# Patient Record
Sex: FEMALE | Race: WHITE | Employment: OTHER | URBAN - METROPOLITAN AREA
[De-identification: names, ages, dates, MRNs, and addresses within clinical notes are randomized per-mention and may not be internally consistent; named-entity substitution may affect disease eponyms.]

---

## 2017-04-04 ENCOUNTER — LAB ENCOUNTER (OUTPATIENT)
Dept: LAB | Facility: HOSPITAL | Age: 82
End: 2017-04-04
Attending: INTERNAL MEDICINE
Payer: MEDICARE

## 2017-04-04 DIAGNOSIS — E78.5 SERUM LIPIDS HIGH: ICD-10-CM

## 2017-04-04 DIAGNOSIS — E11.9 DM (DIABETES MELLITUS) (HCC): Primary | ICD-10-CM

## 2017-04-04 DIAGNOSIS — R19.7 DIARRHEA: ICD-10-CM

## 2017-04-04 DIAGNOSIS — R53.83 FATIGUE: ICD-10-CM

## 2017-04-04 PROCEDURE — 87046 STOOL CULTR AEROBIC BACT EA: CPT

## 2017-04-04 PROCEDURE — 82272 OCCULT BLD FECES 1-3 TESTS: CPT

## 2017-04-04 PROCEDURE — 87269 GIARDIA AG IF: CPT

## 2017-04-04 PROCEDURE — 87045 FECES CULTURE AEROBIC BACT: CPT

## 2017-04-04 PROCEDURE — 87493 C DIFF AMPLIFIED PROBE: CPT

## 2017-04-04 PROCEDURE — 87177 OVA AND PARASITES SMEARS: CPT

## 2017-04-04 PROCEDURE — 87015 SPECIMEN INFECT AGNT CONCNTJ: CPT

## 2017-04-04 PROCEDURE — 87427 SHIGA-LIKE TOXIN AG IA: CPT

## 2017-04-04 PROCEDURE — 87209 SMEAR COMPLEX STAIN: CPT

## 2017-04-05 ENCOUNTER — LAB ENCOUNTER (OUTPATIENT)
Dept: LAB | Facility: HOSPITAL | Age: 82
End: 2017-04-05
Attending: INTERNAL MEDICINE
Payer: MEDICARE

## 2017-04-05 DIAGNOSIS — E11.9 DM (DIABETES MELLITUS) (HCC): Primary | ICD-10-CM

## 2017-04-05 DIAGNOSIS — R63.5 WEIGHT GAIN: ICD-10-CM

## 2017-04-05 DIAGNOSIS — E78.5 SERUM LIPIDS HIGH: ICD-10-CM

## 2017-04-05 DIAGNOSIS — R53.83 FATIGUE: ICD-10-CM

## 2017-04-05 DIAGNOSIS — R19.7 DIARRHEA: ICD-10-CM

## 2017-04-05 PROCEDURE — 80061 LIPID PANEL: CPT

## 2017-04-05 PROCEDURE — 80053 COMPREHEN METABOLIC PANEL: CPT

## 2017-04-05 PROCEDURE — 85025 COMPLETE CBC W/AUTO DIFF WBC: CPT

## 2017-04-05 PROCEDURE — 36415 COLL VENOUS BLD VENIPUNCTURE: CPT

## 2017-04-05 PROCEDURE — 84443 ASSAY THYROID STIM HORMONE: CPT

## 2017-06-19 ENCOUNTER — HOSPITAL ENCOUNTER (OUTPATIENT)
Dept: MAMMOGRAPHY | Facility: HOSPITAL | Age: 82
Discharge: HOME | End: 2017-06-19
Attending: INTERNAL MEDICINE
Payer: MEDICARE

## 2017-06-19 ENCOUNTER — HOSPITAL ENCOUNTER (OUTPATIENT)
Dept: ULTRASOUND IMAGING | Facility: HOSPITAL | Age: 82
Discharge: HOME | End: 2017-06-19
Attending: INTERNAL MEDICINE
Payer: MEDICARE

## 2017-06-19 ENCOUNTER — HOSPITAL ENCOUNTER (OUTPATIENT)
Dept: GENERAL RADIOLOGY | Facility: HOSPITAL | Age: 82
Discharge: HOME | End: 2017-06-19
Attending: INTERNAL MEDICINE
Payer: MEDICARE

## 2017-06-19 DIAGNOSIS — Z12.31 ENCOUNTER FOR SCREENING MAMMOGRAM FOR MALIGNANT NEOPLASM OF BREAST: ICD-10-CM

## 2017-06-19 DIAGNOSIS — Z20.1 EXPOSURE TO TB: ICD-10-CM

## 2017-06-19 DIAGNOSIS — I65.29 CAROTID STENOSIS: ICD-10-CM

## 2017-06-19 PROCEDURE — 71020 XR CHEST PA + LAT CHEST (CPT=71020): CPT | Performed by: INTERNAL MEDICINE

## 2017-06-19 PROCEDURE — 77067 SCR MAMMO BI INCL CAD: CPT | Performed by: INTERNAL MEDICINE

## 2017-06-19 PROCEDURE — 93880 EXTRACRANIAL BILAT STUDY: CPT | Performed by: INTERNAL MEDICINE

## 2018-03-29 ENCOUNTER — HOSPITAL ENCOUNTER (OUTPATIENT)
Dept: CT IMAGING | Facility: HOSPITAL | Age: 83
Discharge: HOME | End: 2018-03-29
Attending: INTERNAL MEDICINE
Payer: MEDICARE

## 2018-03-29 ENCOUNTER — LAB ENCOUNTER (OUTPATIENT)
Dept: LAB | Facility: HOSPITAL | Age: 83
End: 2018-03-29
Attending: INTERNAL MEDICINE
Payer: MEDICARE

## 2018-03-29 DIAGNOSIS — Z82.49 FAMILY HISTORY OF ABDOMINAL AORTIC ANEURYSM (AAA): ICD-10-CM

## 2018-03-29 DIAGNOSIS — R19.7 DIARRHEA: ICD-10-CM

## 2018-03-29 DIAGNOSIS — R53.83 FATIGUE: ICD-10-CM

## 2018-03-29 DIAGNOSIS — R63.5 WEIGHT GAIN: ICD-10-CM

## 2018-03-29 DIAGNOSIS — E78.5 SERUM LIPIDS HIGH: ICD-10-CM

## 2018-03-29 DIAGNOSIS — E11.9 DM (DIABETES MELLITUS) (HCC): Primary | ICD-10-CM

## 2018-03-29 DIAGNOSIS — Z82.49 FH: ABDOMINAL AORTIC ANEURYSM: ICD-10-CM

## 2018-03-29 DIAGNOSIS — Z01.818 PREOP TESTING: ICD-10-CM

## 2018-03-29 LAB — CREAT BLD-MCNC: 1.74 MG/DL (ref 0.55–1.02)

## 2018-03-29 PROCEDURE — 82565 ASSAY OF CREATININE: CPT

## 2018-03-29 PROCEDURE — 75635 CT ANGIO ABDOMINAL ARTERIES: CPT | Performed by: INTERNAL MEDICINE

## 2018-03-29 PROCEDURE — 36415 COLL VENOUS BLD VENIPUNCTURE: CPT

## 2018-04-09 NOTE — IMAGING NOTE
Patient called 4/9/18 to inform the she has hives after ti exam on 3/29/18. She took Benadryl and then later informed her MD. CT Technologist, Bentley Montana, educated patient to always be pre medicated from now on. Radiology RN made aware of incident.

## 2018-04-12 ENCOUNTER — LAB ENCOUNTER (OUTPATIENT)
Dept: LAB | Facility: HOSPITAL | Age: 83
End: 2018-04-12
Attending: INTERNAL MEDICINE
Payer: MEDICARE

## 2018-04-12 DIAGNOSIS — R79.9 ABNORMAL BLOOD CHEMISTRY: Primary | ICD-10-CM

## 2018-04-12 PROCEDURE — 36415 COLL VENOUS BLD VENIPUNCTURE: CPT

## 2018-04-12 PROCEDURE — 84520 ASSAY OF UREA NITROGEN: CPT

## 2018-04-12 PROCEDURE — 82565 ASSAY OF CREATININE: CPT

## 2018-04-25 ENCOUNTER — LAB ENCOUNTER (OUTPATIENT)
Dept: LAB | Facility: HOSPITAL | Age: 83
End: 2018-04-25
Attending: INTERNAL MEDICINE
Payer: MEDICARE

## 2018-04-25 DIAGNOSIS — R79.9 ELEVATED BUN: Primary | ICD-10-CM

## 2018-04-25 DIAGNOSIS — R79.89 ELEVATED SERUM CREATININE: ICD-10-CM

## 2018-04-25 PROCEDURE — 82575 CREATININE CLEARANCE TEST: CPT

## 2018-04-25 PROCEDURE — 82565 ASSAY OF CREATININE: CPT

## 2018-04-25 PROCEDURE — 36415 COLL VENOUS BLD VENIPUNCTURE: CPT

## 2018-05-17 ENCOUNTER — LAB ENCOUNTER (OUTPATIENT)
Dept: LAB | Facility: HOSPITAL | Age: 83
End: 2018-05-17
Attending: INTERNAL MEDICINE
Payer: MEDICARE

## 2018-05-17 DIAGNOSIS — E78.5 ELEVATED LIPIDS: ICD-10-CM

## 2018-05-17 DIAGNOSIS — R79.89 LOW VITAMIN D LEVEL: ICD-10-CM

## 2018-05-17 DIAGNOSIS — R53.83 FATIGUE: ICD-10-CM

## 2018-05-17 DIAGNOSIS — R73.09 ELEVATED GLUCOSE: Primary | ICD-10-CM

## 2018-05-17 PROCEDURE — 85025 COMPLETE CBC W/AUTO DIFF WBC: CPT

## 2018-05-17 PROCEDURE — 80053 COMPREHEN METABOLIC PANEL: CPT

## 2018-05-17 PROCEDURE — 36415 COLL VENOUS BLD VENIPUNCTURE: CPT

## 2018-05-17 PROCEDURE — 80061 LIPID PANEL: CPT

## 2018-05-17 PROCEDURE — 84443 ASSAY THYROID STIM HORMONE: CPT

## 2018-05-17 PROCEDURE — 83036 HEMOGLOBIN GLYCOSYLATED A1C: CPT

## 2018-05-17 PROCEDURE — 84439 ASSAY OF FREE THYROXINE: CPT

## 2018-05-29 ENCOUNTER — OFFICE VISIT (OUTPATIENT)
Dept: NEPHROLOGY | Facility: CLINIC | Age: 83
End: 2018-05-29

## 2018-05-29 VITALS — DIASTOLIC BLOOD PRESSURE: 62 MMHG | BODY MASS INDEX: 29 KG/M2 | SYSTOLIC BLOOD PRESSURE: 134 MMHG | WEIGHT: 172 LBS

## 2018-05-29 DIAGNOSIS — I10 ESSENTIAL HYPERTENSION: ICD-10-CM

## 2018-05-29 DIAGNOSIS — N28.1 RENAL CYST: ICD-10-CM

## 2018-05-29 DIAGNOSIS — N18.30 CKD (CHRONIC KIDNEY DISEASE) STAGE 3, GFR 30-59 ML/MIN (HCC): Primary | ICD-10-CM

## 2018-05-29 PROCEDURE — 99204 OFFICE O/P NEW MOD 45 MIN: CPT | Performed by: INTERNAL MEDICINE

## 2018-05-29 RX ORDER — ALLOPURINOL 100 MG/1
100 TABLET ORAL DAILY
COMMUNITY
End: 2019-02-04

## 2018-05-29 RX ORDER — ASPIRIN 325 MG
325 TABLET ORAL DAILY
COMMUNITY
End: 2021-10-25

## 2018-05-29 RX ORDER — GLIMEPIRIDE 2 MG/1
2 TABLET ORAL 2 TIMES DAILY
COMMUNITY
End: 2019-01-29

## 2018-05-29 NOTE — PROGRESS NOTES
Nephrology Consult Note    REASON FOR CONSULT: CKD 3    ASSESSMENT/PLAN:        1) CKD 3- baseline Cr approx 1.7-1.8 mg/dl since 2015 (and likely previously- but no old labs for comparison on Epic); agree this is likely due to a combination of hypertensive due to a response to renal injury. There is no evidence of malignancy; does not require serial imaging.          HPI:   Reather Libman is a 80year old female with Patient presents with:  Adonis Hutson MD    Presents for evaluation of every morning. Disp:  Rfl:    Metoprolol Succinate ER (TOPROL XL) 25 MG Oral Tablet 24 Hr Take 12.5 mg by mouth daily. Disp:  Rfl:    Atorvastatin Calcium (LIPITOR) 40 MG Oral Tab Take 40 mg by mouth nightly.  Disp:  Rfl:    Acidophilus/Pectin (PROBIOTIC) O

## 2019-01-29 ENCOUNTER — OFFICE VISIT (OUTPATIENT)
Dept: INTERNAL MEDICINE CLINIC | Facility: CLINIC | Age: 84
End: 2019-01-29
Payer: MEDICARE

## 2019-01-29 ENCOUNTER — TELEPHONE (OUTPATIENT)
Dept: INTERNAL MEDICINE CLINIC | Facility: CLINIC | Age: 84
End: 2019-01-29

## 2019-01-29 VITALS
SYSTOLIC BLOOD PRESSURE: 130 MMHG | HEIGHT: 64.5 IN | RESPIRATION RATE: 16 BRPM | HEART RATE: 82 BPM | WEIGHT: 167.5 LBS | BODY MASS INDEX: 28.25 KG/M2 | DIASTOLIC BLOOD PRESSURE: 60 MMHG | TEMPERATURE: 98 F | OXYGEN SATURATION: 99 %

## 2019-01-29 DIAGNOSIS — N18.4 CKD (CHRONIC KIDNEY DISEASE) STAGE 4, GFR 15-29 ML/MIN (HCC): ICD-10-CM

## 2019-01-29 DIAGNOSIS — E78.00 HIGH CHOLESTEROL: ICD-10-CM

## 2019-01-29 DIAGNOSIS — I10 ESSENTIAL HYPERTENSION: ICD-10-CM

## 2019-01-29 DIAGNOSIS — Z86.73 HISTORY OF STROKE: ICD-10-CM

## 2019-01-29 DIAGNOSIS — I77.9 BILATERAL CAROTID ARTERY DISEASE, UNSPECIFIED TYPE (HCC): ICD-10-CM

## 2019-01-29 DIAGNOSIS — M10.9 GOUT, UNSPECIFIED CAUSE, UNSPECIFIED CHRONICITY, UNSPECIFIED SITE: ICD-10-CM

## 2019-01-29 DIAGNOSIS — I70.0 TYPE 2 DIABETES MELLITUS WITH ATHEROSCLEROSIS OF AORTA (HCC): ICD-10-CM

## 2019-01-29 DIAGNOSIS — E11.9 TYPE 2 DIABETES MELLITUS WITHOUT COMPLICATION, WITHOUT LONG-TERM CURRENT USE OF INSULIN (HCC): Primary | ICD-10-CM

## 2019-01-29 DIAGNOSIS — E11.51 TYPE 2 DIABETES MELLITUS WITH ATHEROSCLEROSIS OF AORTA (HCC): ICD-10-CM

## 2019-01-29 PROBLEM — E11.59 TYPE 2 DIABETES MELLITUS WITH ATHEROSCLEROSIS OF AORTA (HCC): Status: ACTIVE | Noted: 2019-01-29

## 2019-01-29 PROBLEM — E11.22 CKD STAGE 4 DUE TO TYPE 2 DIABETES MELLITUS (HCC): Chronic | Status: ACTIVE | Noted: 2019-01-29

## 2019-01-29 PROBLEM — I12.9 CKD STAGE 4 SECONDARY TO HYPERTENSION (HCC): Chronic | Status: ACTIVE | Noted: 2019-01-29

## 2019-01-29 PROCEDURE — 99204 OFFICE O/P NEW MOD 45 MIN: CPT | Performed by: INTERNAL MEDICINE

## 2019-01-29 RX ORDER — METFORMIN HYDROCHLORIDE 500 MG/1
1000 TABLET, EXTENDED RELEASE ORAL
Qty: 180 TABLET | Refills: 0 | Status: SHIPPED | OUTPATIENT
Start: 2019-01-29 | End: 2019-04-27

## 2019-01-29 RX ORDER — LOSARTAN POTASSIUM 25 MG/1
25 TABLET ORAL DAILY
Qty: 90 TABLET | Refills: 0 | Status: SHIPPED | OUTPATIENT
Start: 2019-01-29 | End: 2019-04-04

## 2019-01-29 NOTE — PROGRESS NOTES
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)     History of stroke      Current Medications:      Current Outpatient Medications:  Glucose Blood (ONETOUCH ULTRA BLUE) In Vitro Strip USE TO TEST BLOOD SUGERS D Topics      Concerns:        Caffeine Concern: Not Asked        Exercise: Not Asked        Seat Belt: Not Asked        Special Diet: Not Asked        Stress Concern: Not Asked        Weight Concern: Not Asked    Social History Narrative      Not on file tender,FROM of the back  EXTREMITIES: no cyanosis, clubbing or edema  NEURO: Oriented times three,cranial nerves are intact,motor and sensory are grossly intact    ASSESSMENT AND PLAN:   1.  Type 2 diabetes mellitus without complication, without long-term c Cholesterol, Total 124 <200 mg/dL    Triglycerides 170 (H) <150 mg/dL    HDL Cholesterol 45 (L) >45 mg/dL    LDL Cholesterol 45 <130 mg/dL    VLDL 34 5 - 40 mg/dL    Chol/HDL Ratio 2.76 <4.44    Non HDL Chol 79 <130 mg/dL   TSH+FREE T4   Result Value Re

## 2019-02-01 NOTE — TELEPHONE ENCOUNTER
Records request for all of 2015 faxed to [facility], [phone] (fax); [phone] (phone)

## 2019-02-04 ENCOUNTER — MED REC SCAN ONLY (OUTPATIENT)
Dept: INTERNAL MEDICINE CLINIC | Facility: CLINIC | Age: 84
End: 2019-02-04

## 2019-02-04 RX ORDER — ATORVASTATIN CALCIUM 40 MG/1
40 TABLET, FILM COATED ORAL NIGHTLY
Qty: 90 TABLET | Refills: 0 | Status: SHIPPED | OUTPATIENT
Start: 2019-02-04 | End: 2019-05-03

## 2019-02-04 RX ORDER — ALLOPURINOL 100 MG/1
100 TABLET ORAL DAILY
Qty: 90 TABLET | Refills: 0 | Status: SHIPPED | OUTPATIENT
Start: 2019-02-04 | End: 2019-04-28

## 2019-02-04 RX ORDER — LOSARTAN POTASSIUM 25 MG/1
25 TABLET ORAL DAILY
Qty: 90 TABLET | Refills: 0 | Status: CANCELLED | OUTPATIENT
Start: 2019-02-04

## 2019-02-04 RX ORDER — METOPROLOL SUCCINATE 25 MG/1
12.5 TABLET, EXTENDED RELEASE ORAL DAILY
Qty: 90 TABLET | Refills: 0 | Status: SHIPPED | OUTPATIENT
Start: 2019-02-04 | End: 2019-07-23

## 2019-02-04 NOTE — TELEPHONE ENCOUNTER
Last OV relevant to medication: 1/29/19  Last refill date: Externally Reported     #/refills: Externally Reported  When pt was asked to return for OV: 3 months  Upcoming appt/reason: none

## 2019-02-04 NOTE — TELEPHONE ENCOUNTER
Was patient advised to contact pharmacy directly?:  Yes - meds were prescribed by a different doctor  Is patient out of meds or supply very low?:  Very low    Medication and Dose Requested:  Atorvastatin Calcium (LIPITOR) 40 MG Oral Tab

## 2019-02-05 PROBLEM — Z86.19 HISTORY OF HELICOBACTER PYLORI INFECTION: Status: ACTIVE | Noted: 2019-02-05

## 2019-02-20 ENCOUNTER — PATIENT MESSAGE (OUTPATIENT)
Dept: INTERNAL MEDICINE CLINIC | Facility: CLINIC | Age: 84
End: 2019-02-20

## 2019-02-21 NOTE — TELEPHONE ENCOUNTER
From: Radha Gavin  To: Day Bedoya MD  Sent: 2/20/2019 2:45 PM CST  Subject: Non-Urgent Medical Question    I have been unable to take Losartan Potassium 25 mg. My BP has been ranging 83/56 to 97/60.    Also with Metformin HCL my blood sugars

## 2019-02-28 ENCOUNTER — HOSPITAL ENCOUNTER (OUTPATIENT)
Dept: ULTRASOUND IMAGING | Facility: HOSPITAL | Age: 84
Discharge: HOME | End: 2019-02-28
Attending: INTERNAL MEDICINE
Payer: MEDICARE

## 2019-02-28 DIAGNOSIS — I77.9 BILATERAL CAROTID ARTERY DISEASE, UNSPECIFIED TYPE (HCC): ICD-10-CM

## 2019-02-28 PROCEDURE — 93880 EXTRACRANIAL BILAT STUDY: CPT | Performed by: INTERNAL MEDICINE

## 2019-03-01 NOTE — PROGRESS NOTES
Spoke to patient, aware of results, and recommendations. Pt voiced understanding. Patient to call Dr. Troy Marroquin to make an appt.

## 2019-03-02 ENCOUNTER — PATIENT MESSAGE (OUTPATIENT)
Dept: INTERNAL MEDICINE CLINIC | Facility: CLINIC | Age: 84
End: 2019-03-02

## 2019-03-04 NOTE — TELEPHONE ENCOUNTER
From: Roger Larios  To: Noé Cameron MD  Sent: 3/2/2019 11:27 AM CST  Subject: Prescription Question    Would Dr. Lei Pump please write a script for Jose Sanchezon test strips. It is on my medication list  but could not be refilled at this time.

## 2019-03-19 ENCOUNTER — TELEPHONE (OUTPATIENT)
Dept: INTERNAL MEDICINE CLINIC | Facility: CLINIC | Age: 84
End: 2019-03-19

## 2019-03-25 ENCOUNTER — TELEPHONE (OUTPATIENT)
Dept: INTERNAL MEDICINE CLINIC | Facility: CLINIC | Age: 84
End: 2019-03-25

## 2019-03-25 NOTE — TELEPHONE ENCOUNTER
Spoke with pt, confirmed medication doses. Was started on losartan 25mg on Jan 29, 2019. Pt reports while on losartan her systolic BP ranged between 82 and 207; diastolic 82-75 so pt d/c'd losartan after 6 days, she still takes metoprolol.  Since then h

## 2019-03-25 NOTE — TELEPHONE ENCOUNTER
Patient is on medication: MetFORMIN HCl  MG Oral Tablet 24 Hr and Losartan Potassium 25 MG Oral Tab  Patient said the medications are not working well for the patient. Patient BP is at a average: 93/76. Below are a list of the patients blood sugars.

## 2019-03-26 ENCOUNTER — TELEPHONE (OUTPATIENT)
Dept: INTERNAL MEDICINE CLINIC | Facility: CLINIC | Age: 84
End: 2019-03-26

## 2019-03-26 NOTE — TELEPHONE ENCOUNTER
PSR - please ask pt to schedule BP/DM check this week with Dr Kylah Song or Merry Conrad, thank you!   1) Make EXTENDED visit  2) Pt to bring her BP machine  3) Ask pt to do her labs this week before appt

## 2019-03-26 NOTE — TELEPHONE ENCOUNTER
Spoke to pharmacy, informed Damien Chapman that our office will be calling the patient back this afternoon to address the Losartan. She expressed understanding.     Gabriele Hoodry with pt yesterday to confirm medication doses:    \"Patient was started on losartan 2

## 2019-03-26 NOTE — TELEPHONE ENCOUNTER
Adriana Seaman from [address] [place] [address] (patient's pharmacy) wanted to talk to a nurse regarding patient not  taking Losartan. According to the pharmacy, she picked up the prescription on 1/29 and took it for 6 days then stopped taking it.   She told the pharmacist that o

## 2019-03-28 NOTE — TELEPHONE ENCOUNTER
Called patient again and spoke with the patient. Advised of below information, patient needs time to get labs done, patient was given central scheduling phone number to call to schedule blood work, was told to go fasting 10-12 hours.   Patient scheduled a

## 2019-03-29 ENCOUNTER — APPOINTMENT (OUTPATIENT)
Dept: LAB | Facility: HOSPITAL | Age: 84
End: 2019-03-29
Attending: INTERNAL MEDICINE
Payer: MEDICARE

## 2019-03-29 DIAGNOSIS — M10.9 GOUT, UNSPECIFIED CAUSE, UNSPECIFIED CHRONICITY, UNSPECIFIED SITE: ICD-10-CM

## 2019-03-29 DIAGNOSIS — E11.9 TYPE 2 DIABETES MELLITUS WITHOUT COMPLICATION, WITHOUT LONG-TERM CURRENT USE OF INSULIN (HCC): ICD-10-CM

## 2019-03-29 PROCEDURE — 82043 UR ALBUMIN QUANTITATIVE: CPT

## 2019-03-29 PROCEDURE — 36415 COLL VENOUS BLD VENIPUNCTURE: CPT

## 2019-03-29 PROCEDURE — 83036 HEMOGLOBIN GLYCOSYLATED A1C: CPT

## 2019-03-29 PROCEDURE — 80061 LIPID PANEL: CPT

## 2019-03-29 PROCEDURE — 82570 ASSAY OF URINE CREATININE: CPT

## 2019-03-29 PROCEDURE — 84550 ASSAY OF BLOOD/URIC ACID: CPT

## 2019-03-29 PROCEDURE — 80053 COMPREHEN METABOLIC PANEL: CPT

## 2019-04-04 ENCOUNTER — OFFICE VISIT (OUTPATIENT)
Dept: INTERNAL MEDICINE CLINIC | Facility: CLINIC | Age: 84
End: 2019-04-04
Payer: MEDICARE

## 2019-04-04 VITALS
SYSTOLIC BLOOD PRESSURE: 144 MMHG | HEIGHT: 64.5 IN | WEIGHT: 164.81 LBS | RESPIRATION RATE: 16 BRPM | BODY MASS INDEX: 27.8 KG/M2 | DIASTOLIC BLOOD PRESSURE: 58 MMHG | TEMPERATURE: 98 F | HEART RATE: 72 BPM | OXYGEN SATURATION: 98 %

## 2019-04-04 DIAGNOSIS — N18.4 CKD STAGE 4 DUE TO TYPE 2 DIABETES MELLITUS (HCC): ICD-10-CM

## 2019-04-04 DIAGNOSIS — E11.51 TYPE 2 DIABETES MELLITUS WITH ATHEROSCLEROSIS OF AORTA (HCC): Primary | ICD-10-CM

## 2019-04-04 DIAGNOSIS — E11.22 CKD STAGE 4 DUE TO TYPE 2 DIABETES MELLITUS (HCC): ICD-10-CM

## 2019-04-04 DIAGNOSIS — I70.0 TYPE 2 DIABETES MELLITUS WITH ATHEROSCLEROSIS OF AORTA (HCC): Primary | ICD-10-CM

## 2019-04-04 DIAGNOSIS — N18.4 CKD STAGE 4 SECONDARY TO HYPERTENSION (HCC): ICD-10-CM

## 2019-04-04 DIAGNOSIS — E87.5 HYPERKALEMIA: ICD-10-CM

## 2019-04-04 DIAGNOSIS — I12.9 CKD STAGE 4 SECONDARY TO HYPERTENSION (HCC): ICD-10-CM

## 2019-04-04 DIAGNOSIS — N18.4 CKD (CHRONIC KIDNEY DISEASE) STAGE 4, GFR 15-29 ML/MIN (HCC): ICD-10-CM

## 2019-04-04 DIAGNOSIS — E11.42 DIABETIC POLYNEUROPATHY ASSOCIATED WITH TYPE 2 DIABETES MELLITUS (HCC): ICD-10-CM

## 2019-04-04 PROCEDURE — 99214 OFFICE O/P EST MOD 30 MIN: CPT | Performed by: INTERNAL MEDICINE

## 2019-04-04 RX ORDER — GLIMEPIRIDE 2 MG/1
2 TABLET ORAL 2 TIMES DAILY
Qty: 180 TABLET | Refills: 1 | Status: SHIPPED | OUTPATIENT
Start: 2019-04-04 | End: 2019-07-23

## 2019-04-04 RX ORDER — HYDROCHLOROTHIAZIDE 25 MG/1
25 TABLET ORAL DAILY
Qty: 90 TABLET | Refills: 1 | Status: SHIPPED | OUTPATIENT
Start: 2019-04-04 | End: 2019-10-04

## 2019-04-04 NOTE — PATIENT INSTRUCTIONS
Caution starting the glimeperide-  Start with 1 daily and be aware of hypoglycemia  Do BMP 2 weeks   Diabetes 3 mos and see me 3 mos

## 2019-04-04 NOTE — PROGRESS NOTES
HPI:   Indu Keen is a 80year old female who presents for recheck of her diabetes. Pt complains of much higher sugars since changing to ER metformin and stopping glimeperide.  Her diarrhea is gone  Also her BP would not tolerate low dose ARB so off now Helicobacter pylori infection     Diabetic polyneuropathy associated with type 2 diabetes mellitus ([facility] [address]     Social History: Social History    Tobacco Use      Smoking status: Former Smoker        Quit date: 1998        Years since quittin.9 149 mg/dL    LDL Cholesterol 46 <100 mg/dL    VLDL 34 (H) 0 - 30 mg/dL    Non HDL Chol 80 <130 mg/dL   HEMOGLOBIN A1C   Result Value Ref Range    HgbA1C 7.7 (H) <5.7 %    Estimated Average Glucose 174 (H) 68 - 126 mg/dL   MICROALB/CREAT RATIO, RANDOM URINE Sig: Take 1 tablet (25 mg total) by mouth daily.        Imaging & Consults:  None    Patient Instructions   Caution starting the glimeperide-  Start with 1 daily and be aware of hypoglycemia  Do BMP 2 weeks   Diabetes 3 mos and see me 3 mos      Return in

## 2019-04-18 ENCOUNTER — LAB ENCOUNTER (OUTPATIENT)
Dept: LAB | Facility: HOSPITAL | Age: 84
End: 2019-04-18
Attending: INTERNAL MEDICINE
Payer: MEDICARE

## 2019-04-18 DIAGNOSIS — N18.4 CKD (CHRONIC KIDNEY DISEASE) STAGE 4, GFR 15-29 ML/MIN (HCC): ICD-10-CM

## 2019-04-18 PROCEDURE — 80048 BASIC METABOLIC PNL TOTAL CA: CPT

## 2019-04-18 PROCEDURE — 36415 COLL VENOUS BLD VENIPUNCTURE: CPT

## 2019-04-29 RX ORDER — LOSARTAN POTASSIUM 25 MG/1
25 TABLET ORAL DAILY
Qty: 90 TABLET | Refills: 0 | OUTPATIENT
Start: 2019-04-29

## 2019-04-29 RX ORDER — METFORMIN HYDROCHLORIDE 500 MG/1
1000 TABLET, EXTENDED RELEASE ORAL
Qty: 180 TABLET | Refills: 0 | Status: SHIPPED | OUTPATIENT
Start: 2019-04-29 | End: 2019-07-23

## 2019-04-29 RX ORDER — ALLOPURINOL 100 MG/1
100 TABLET ORAL DAILY
Qty: 90 TABLET | Refills: 0 | Status: SHIPPED | OUTPATIENT
Start: 2019-04-29 | End: 2019-07-23

## 2019-04-29 NOTE — TELEPHONE ENCOUNTER
Last OV relevant to medication: 4/4/19  Last refill date: 1/29/19     #/refills: 180/0  When pt was asked to return for OV: 3 months, dm check, supervisit  Upcoming appt/reason: none    Lab Results   Component Value Date     (H) 03/29/2019    A1C 7.

## 2019-04-29 NOTE — TELEPHONE ENCOUNTER
Last OV relevant to medication: 4/4/19  Last refill date: 2/4/19    #90/refills:0  When pt was asked to return for OV: 7/2019  Upcoming appt/reason: No appt scheduled.    Lab Results   Component Value Date    24VOL 1,450 04/25/2018    URIC 7.2 (H) 03/29/201

## 2019-05-03 ENCOUNTER — TELEPHONE (OUTPATIENT)
Dept: INTERNAL MEDICINE CLINIC | Facility: CLINIC | Age: 84
End: 2019-05-03

## 2019-05-03 DIAGNOSIS — E78.00 HIGH CHOLESTEROL: Primary | ICD-10-CM

## 2019-05-03 RX ORDER — ATORVASTATIN CALCIUM 40 MG/1
40 TABLET, FILM COATED ORAL NIGHTLY
Qty: 90 TABLET | Refills: 0 | Status: SHIPPED | OUTPATIENT
Start: 2019-05-03 | End: 2019-07-23

## 2019-05-03 NOTE — TELEPHONE ENCOUNTER
Brad Casey from [facility] is requesting office notes from the last foot exam within the last 6 months and notes from the diabetic statement. Brad Casey can be reached at [facility]: [phone]. Please advise. Thank you!

## 2019-05-03 NOTE — TELEPHONE ENCOUNTER
This is medical records request?  Including [facility] podiatry. She needs a med rec release.

## 2019-05-06 NOTE — TELEPHONE ENCOUNTER
SHERI called patient requested medical records request form be mailed for her to fill out and return to [facility] to send records to [facility].

## 2019-05-21 ENCOUNTER — TELEPHONE (OUTPATIENT)
Dept: INTERNAL MEDICINE CLINIC | Facility: CLINIC | Age: 84
End: 2019-05-21

## 2019-05-21 RX ORDER — LANCETS 33 GAUGE
EACH MISCELLANEOUS
Qty: 100 EACH | Refills: 3 | Status: SHIPPED | OUTPATIENT
Start: 2019-05-21 | End: 2019-05-21

## 2019-05-21 RX ORDER — LANCETS 33 GAUGE
EACH MISCELLANEOUS
Qty: 100 EACH | Refills: 3 | Status: SHIPPED | OUTPATIENT
Start: 2019-05-21 | End: 2020-06-12

## 2019-05-21 NOTE — TELEPHONE ENCOUNTER
Received order for therapeutic shoe inserts. Pt sees Dr Guerita Pandya at [facility] ortho for DM shoes. Dr Forrest Cazares printed. Form to Dr Seema Mora to sign.

## 2019-05-21 NOTE — TELEPHONE ENCOUNTER
[facility] and pt for last OV summary and notes re dm visit. Needs signature.  To triage

## 2019-05-21 NOTE — TELEPHONE ENCOUNTER
Pt needs rx for One Touch Lancettes Ultra [facility] [address] in [address] [place] [address]. Qty 3 month supply please, pt is almost out. Please call pt when this is filled.  Thank you

## 2019-05-22 NOTE — TELEPHONE ENCOUNTER
Faxed to [facility] - fax# [phone]; phone [phone]  Records to scan.

## 2019-07-11 ENCOUNTER — APPOINTMENT (OUTPATIENT)
Dept: LAB | Facility: HOSPITAL | Age: 84
End: 2019-07-11
Attending: INTERNAL MEDICINE
Payer: MEDICARE

## 2019-07-11 DIAGNOSIS — I70.0 TYPE 2 DIABETES MELLITUS WITH ATHEROSCLEROSIS OF AORTA (HCC): ICD-10-CM

## 2019-07-11 DIAGNOSIS — E11.51 TYPE 2 DIABETES MELLITUS WITH ATHEROSCLEROSIS OF AORTA (HCC): ICD-10-CM

## 2019-07-11 LAB
ALBUMIN SERPL-MCNC: 3.4 G/DL (ref 3.4–5)
ALBUMIN/GLOB SERPL: 0.8 {RATIO} (ref 1–2)
ALP LIVER SERPL-CCNC: 88 U/L (ref 55–142)
ALT SERPL-CCNC: 19 U/L (ref 13–56)
ANION GAP SERPL CALC-SCNC: 8 MMOL/L (ref 0–18)
AST SERPL-CCNC: 14 U/L (ref 15–37)
BILIRUB SERPL-MCNC: 0.6 MG/DL (ref 0.1–2)
BUN BLD-MCNC: 20 MG/DL (ref 7–18)
BUN/CREAT SERPL: 13.7 (ref 10–20)
CALCIUM BLD-MCNC: 10.1 MG/DL (ref 8.5–10.1)
CHLORIDE SERPL-SCNC: 112 MMOL/L (ref 98–112)
CHOLEST SMN-MCNC: 91 MG/DL (ref ?–200)
CO2 SERPL-SCNC: 24 MMOL/L (ref 21–32)
CREAT BLD-MCNC: 1.46 MG/DL (ref 0.55–1.02)
EST. AVERAGE GLUCOSE BLD GHB EST-MCNC: 180 MG/DL (ref 68–126)
GLOBULIN PLAS-MCNC: 4.1 G/DL (ref 2.8–4.4)
GLUCOSE BLD-MCNC: 140 MG/DL (ref 70–99)
HBA1C MFR BLD HPLC: 7.9 % (ref ?–5.7)
HDLC SERPL-MCNC: 53 MG/DL (ref 40–59)
LDLC SERPL CALC-MCNC: 14 MG/DL (ref ?–100)
M PROTEIN MFR SERPL ELPH: 7.5 G/DL (ref 6.4–8.2)
NONHDLC SERPL-MCNC: 38 MG/DL (ref ?–130)
OSMOLALITY SERPL CALC.SUM OF ELEC: 303 MOSM/KG (ref 275–295)
POTASSIUM SERPL-SCNC: 4.6 MMOL/L (ref 3.5–5.1)
SODIUM SERPL-SCNC: 144 MMOL/L (ref 136–145)
TRIGL SERPL-MCNC: 121 MG/DL (ref 30–149)
VLDLC SERPL CALC-MCNC: 24 MG/DL (ref 0–30)

## 2019-07-11 PROCEDURE — 80053 COMPREHEN METABOLIC PANEL: CPT

## 2019-07-11 PROCEDURE — 83036 HEMOGLOBIN GLYCOSYLATED A1C: CPT

## 2019-07-11 PROCEDURE — 80061 LIPID PANEL: CPT

## 2019-07-11 PROCEDURE — 36415 COLL VENOUS BLD VENIPUNCTURE: CPT

## 2019-07-23 ENCOUNTER — OFFICE VISIT (OUTPATIENT)
Dept: INTERNAL MEDICINE CLINIC | Facility: CLINIC | Age: 84
End: 2019-07-23
Payer: MEDICARE

## 2019-07-23 VITALS
TEMPERATURE: 98 F | RESPIRATION RATE: 16 BRPM | DIASTOLIC BLOOD PRESSURE: 60 MMHG | SYSTOLIC BLOOD PRESSURE: 130 MMHG | HEART RATE: 71 BPM | WEIGHT: 167.31 LBS | OXYGEN SATURATION: 97 % | BODY MASS INDEX: 28.22 KG/M2 | HEIGHT: 64.5 IN

## 2019-07-23 DIAGNOSIS — M10.9 GOUT, UNSPECIFIED CAUSE, UNSPECIFIED CHRONICITY, UNSPECIFIED SITE: ICD-10-CM

## 2019-07-23 DIAGNOSIS — I10 ESSENTIAL HYPERTENSION: ICD-10-CM

## 2019-07-23 DIAGNOSIS — E78.00 HIGH CHOLESTEROL: ICD-10-CM

## 2019-07-23 DIAGNOSIS — I70.0 TYPE 2 DIABETES MELLITUS WITH ATHEROSCLEROSIS OF AORTA (HCC): Primary | ICD-10-CM

## 2019-07-23 DIAGNOSIS — N18.30 CKD (CHRONIC KIDNEY DISEASE) STAGE 3, GFR 30-59 ML/MIN (HCC): ICD-10-CM

## 2019-07-23 DIAGNOSIS — E11.51 TYPE 2 DIABETES MELLITUS WITH ATHEROSCLEROSIS OF AORTA (HCC): Primary | ICD-10-CM

## 2019-07-23 PROCEDURE — 99214 OFFICE O/P EST MOD 30 MIN: CPT | Performed by: INTERNAL MEDICINE

## 2019-07-23 RX ORDER — ATORVASTATIN CALCIUM 40 MG/1
40 TABLET, FILM COATED ORAL NIGHTLY
Qty: 90 TABLET | Refills: 1 | Status: SHIPPED | OUTPATIENT
Start: 2019-07-23 | End: 2020-05-12

## 2019-07-23 RX ORDER — ALLOPURINOL 100 MG/1
100 TABLET ORAL DAILY
Qty: 90 TABLET | Refills: 1 | Status: SHIPPED | OUTPATIENT
Start: 2019-07-23 | End: 2020-02-25

## 2019-07-23 RX ORDER — METFORMIN HYDROCHLORIDE 500 MG/1
1000 TABLET, EXTENDED RELEASE ORAL
Qty: 180 TABLET | Refills: 1 | Status: SHIPPED | OUTPATIENT
Start: 2019-07-23 | End: 2019-12-17

## 2019-07-23 RX ORDER — METOPROLOL SUCCINATE 25 MG/1
12.5 TABLET, EXTENDED RELEASE ORAL DAILY
Qty: 90 TABLET | Refills: 0 | Status: SHIPPED | OUTPATIENT
Start: 2019-07-23 | End: 2020-03-20

## 2019-07-23 NOTE — PROGRESS NOTES
HPI:   Dejon Monterroso is a 80year old female who presents for recheck of her diabetes.  Sugars have running 149-200 since last visit in the mornings,   We resumed the gimeperide then due to sugars being more like 230-250  She is asymptomatic  In the last c polyneuropathy associated with type 2 diabetes mellitus (HCC)     CKD (chronic kidney disease) stage 3, GFR 30-59 ml/min     Social History: Social History    Tobacco Use      Smoking status: Former Smoker        Quit date: 5/5/1998        Years since quit ASSESSMENT AND PLAN:   Brennon Mcintosh is a 80year old female who presents for a recheck of her diabetes.  Diabetic control is not so good, actually got worse on glimeperide, can't really increase AGUILAR or metformin dud to CKD, her sugars are startin

## 2019-07-25 ENCOUNTER — TELEPHONE (OUTPATIENT)
Dept: INTERNAL MEDICINE CLINIC | Facility: CLINIC | Age: 84
End: 2019-07-25

## 2019-07-25 NOTE — TELEPHONE ENCOUNTER
Incoming (mail or fax):  Fax  Received from: [facility]   Documentation given to: Maria L Storey     *Medical Records Request

## 2019-07-26 RX ORDER — ALLOPURINOL 100 MG/1
100 TABLET ORAL DAILY
Qty: 90 TABLET | Refills: 0 | OUTPATIENT
Start: 2019-07-26

## 2019-07-26 RX ORDER — METOPROLOL SUCCINATE 25 MG/1
TABLET, EXTENDED RELEASE ORAL
Qty: 45 TABLET | Refills: 0 | OUTPATIENT
Start: 2019-07-26

## 2019-07-26 RX ORDER — METFORMIN HYDROCHLORIDE 500 MG/1
1000 TABLET, EXTENDED RELEASE ORAL
Qty: 180 TABLET | Refills: 0 | OUTPATIENT
Start: 2019-07-26

## 2019-07-30 NOTE — TELEPHONE ENCOUNTER
Medical records request rec'd from [address] [place] [address]  for records dated 1-1-18 to present. Forwarded to Scan Stat 7-31-19.   See med rec scan dated 7-30-19

## 2019-08-08 DIAGNOSIS — E78.00 HIGH CHOLESTEROL: ICD-10-CM

## 2019-08-08 RX ORDER — ATORVASTATIN CALCIUM 40 MG/1
TABLET, FILM COATED ORAL
Qty: 90 TABLET | Refills: 0 | OUTPATIENT
Start: 2019-08-08

## 2019-08-09 DIAGNOSIS — E78.00 HIGH CHOLESTEROL: ICD-10-CM

## 2019-08-12 RX ORDER — ATORVASTATIN CALCIUM 40 MG/1
TABLET, FILM COATED ORAL
Qty: 90 TABLET | Refills: 0 | OUTPATIENT
Start: 2019-08-12

## 2019-08-12 NOTE — TELEPHONE ENCOUNTER
Patient returned call again. Patient states she does not want to use [facility] as her pharmacy any longer and has \"plenty\" of atorvastatin. Refill request denied, [facility] removed from list of patient's preferred pharmacies.

## 2019-08-12 NOTE — TELEPHONE ENCOUNTER
Patient returned call, unable to hear patient. Only heard what sounds like phone buttons being pressed on patient's end. Encouraged her to call back if she could hear me.

## 2019-08-12 NOTE — TELEPHONE ENCOUNTER
LM for pt to call back. Atorvastatin last ordered to [facility] ([facility] 7/23/19, qty 90+1 addl ref. Does she want pharmacy changed?

## 2019-09-25 ENCOUNTER — MA CHART PREP (OUTPATIENT)
Dept: FAMILY MEDICINE CLINIC | Facility: CLINIC | Age: 84
End: 2019-09-25

## 2019-09-25 NOTE — TELEPHONE ENCOUNTER
Chart reviewed as MA Chart Prep for upcoming Medicare Advantage visit scheduled for 10/29/2019. One new potential [facility] [address] diagnosis identified. In-basket message sent to Dr. Gilson Ravi.

## 2019-09-26 PROBLEM — N18.30 CKD (CHRONIC KIDNEY DISEASE) STAGE 3, GFR 30-59 ML/MIN (HCC): Status: RESOLVED | Noted: 2019-07-23 | Resolved: 2019-09-26

## 2019-09-26 PROBLEM — I65.23 BILATERAL CAROTID ARTERY OCCLUSION: Status: ACTIVE | Noted: 2019-09-26

## 2019-10-04 DIAGNOSIS — I10 ESSENTIAL HYPERTENSION: Primary | ICD-10-CM

## 2019-10-04 RX ORDER — HYDROCHLOROTHIAZIDE 25 MG/1
TABLET ORAL
Qty: 90 TABLET | Refills: 0 | Status: SHIPPED | OUTPATIENT
Start: 2019-10-04 | End: 2019-12-23

## 2019-10-22 RX ORDER — GLIMEPIRIDE 2 MG/1
TABLET ORAL
Qty: 180 TABLET | Refills: 0 | Status: SHIPPED | OUTPATIENT
Start: 2019-10-22 | End: 2019-10-29

## 2019-10-22 NOTE — TELEPHONE ENCOUNTER
Last OV relevant to medication: 7/23/19 dm chk  Last refill date: 4/4/19     #/refills: 180/1  When pt was asked to return for OV: 3 months supervisit   Upcoming appt/reason: 10/29/19 supervisit  Lab Results   Component Value Date     (H) 07/11/2019

## 2019-10-23 ENCOUNTER — APPOINTMENT (OUTPATIENT)
Dept: LAB | Facility: HOSPITAL | Age: 84
End: 2019-10-23
Attending: INTERNAL MEDICINE
Payer: MEDICARE

## 2019-10-23 DIAGNOSIS — I70.0 TYPE 2 DIABETES MELLITUS WITH ATHEROSCLEROSIS OF AORTA (HCC): ICD-10-CM

## 2019-10-23 DIAGNOSIS — E11.51 TYPE 2 DIABETES MELLITUS WITH ATHEROSCLEROSIS OF AORTA (HCC): ICD-10-CM

## 2019-10-23 PROCEDURE — 36415 COLL VENOUS BLD VENIPUNCTURE: CPT

## 2019-10-23 PROCEDURE — 83036 HEMOGLOBIN GLYCOSYLATED A1C: CPT

## 2019-10-23 PROCEDURE — 80053 COMPREHEN METABOLIC PANEL: CPT

## 2019-10-23 PROCEDURE — 80061 LIPID PANEL: CPT

## 2019-10-29 ENCOUNTER — OFFICE VISIT (OUTPATIENT)
Dept: INTERNAL MEDICINE CLINIC | Facility: CLINIC | Age: 84
End: 2019-10-29
Payer: MEDICARE

## 2019-10-29 VITALS
RESPIRATION RATE: 16 BRPM | HEIGHT: 64.5 IN | HEART RATE: 88 BPM | OXYGEN SATURATION: 98 % | SYSTOLIC BLOOD PRESSURE: 118 MMHG | WEIGHT: 169.88 LBS | BODY MASS INDEX: 28.65 KG/M2 | DIASTOLIC BLOOD PRESSURE: 52 MMHG

## 2019-10-29 DIAGNOSIS — N18.4 CKD STAGE 4 DUE TO TYPE 2 DIABETES MELLITUS (HCC): ICD-10-CM

## 2019-10-29 DIAGNOSIS — N18.4 CKD STAGE 4 SECONDARY TO HYPERTENSION (HCC): ICD-10-CM

## 2019-10-29 DIAGNOSIS — E11.22 CKD STAGE 4 DUE TO TYPE 2 DIABETES MELLITUS (HCC): ICD-10-CM

## 2019-10-29 DIAGNOSIS — IMO0001 UNCONTROLLED TYPE 2 DIABETES MELLITUS WITHOUT COMPLICATION, WITHOUT LONG-TERM CURRENT USE OF INSULIN: Primary | ICD-10-CM

## 2019-10-29 DIAGNOSIS — F33.1 DEPRESSION, MAJOR, RECURRENT, MODERATE (HCC): ICD-10-CM

## 2019-10-29 DIAGNOSIS — I12.9 CKD STAGE 4 SECONDARY TO HYPERTENSION (HCC): ICD-10-CM

## 2019-10-29 DIAGNOSIS — I35.0 AORTIC STENOSIS, MILD: ICD-10-CM

## 2019-10-29 PROBLEM — I10 ESSENTIAL HYPERTENSION: Status: ACTIVE | Noted: 2019-10-29

## 2019-10-29 PROCEDURE — 99214 OFFICE O/P EST MOD 30 MIN: CPT | Performed by: INTERNAL MEDICINE

## 2019-10-29 RX ORDER — GLIMEPIRIDE 2 MG/1
TABLET ORAL
Qty: 270 TABLET | Refills: 0 | Status: SHIPPED | OUTPATIENT
Start: 2019-10-29 | End: 2019-12-17

## 2019-10-29 NOTE — PROGRESS NOTES
HPI:   Franny Torres is a 80year old female who presents for recheck of her diabetes.   She was originally scheduled for a supervisit but her DM is poorly controlled so we will tend to that today  She is feeling fine but just got over a URI and earache la HCl  MG Oral Tablet 24 Hr, Take 2 tablets (1,000 mg total) by mouth daily with breakfast., Disp: 180 tablet, Rfl: 1  atorvastatin 40 MG Oral Tab, Take 1 tablet (40 mg total) by mouth nightly., Disp: 90 tablet, Rfl: 1  Metoprolol Succinate ER 25 MG Or Sitting, Cuff Size: large)   Pulse 88   Resp 16   Ht 64.5\"   Wt 169 lb 14.4 oz (77.1 kg)   LMP  (LMP Unknown)   SpO2 98%   BMI 28.71 kg/m²   GENERAL: well developed, well nourished,in no apparent distress  SKIN: no rashes,no suspicious lesions  NECK: supp Hypercholesterolemia/hyperlipidemia/high cholesterol treatment on statin, CPM , Urine microalbumin/nephropathy management: not on ACE/ARB, low on urine, Eye and Depression screens are UTD.   See orders for further recommendations  The patient is asked to re

## 2019-11-11 ENCOUNTER — HOSPITAL ENCOUNTER (OUTPATIENT)
Dept: CV DIAGNOSTICS | Facility: HOSPITAL | Age: 84
Discharge: HOME | End: 2019-11-11
Attending: INTERNAL MEDICINE
Payer: MEDICARE

## 2019-11-11 DIAGNOSIS — I35.0 AORTIC STENOSIS, MILD: ICD-10-CM

## 2019-11-11 PROCEDURE — 93306 TTE W/DOPPLER COMPLETE: CPT | Performed by: INTERNAL MEDICINE

## 2019-11-14 NOTE — PROGRESS NOTES
Spoke to pt, aware of results & recommendations. Pt voiced understanding. Made aware [facility] & future labs ordered. Pt voiced understanding. Stated will call back later to schedule 3 mos followup ext OV.

## 2019-11-20 ENCOUNTER — TELEPHONE (OUTPATIENT)
Dept: INTERNAL MEDICINE CLINIC | Facility: CLINIC | Age: 84
End: 2019-11-20

## 2019-11-20 NOTE — TELEPHONE ENCOUNTER
Received fax that patient is requesting a new Rx as Delmus Mathis is too expensive for her. Aware that options are limited due to CKD. Samples are not available in the office for this medication. Are other options available?     Looked for [employer] coupon (prescr

## 2019-11-20 NOTE — TELEPHONE ENCOUNTER
No  This has been the issue and I warned her of this  She can't take actos or Maki or SGTIs  Is there another DPP4 Inhibitor that may be cheaper for her? [place] and Karen or onglyza?  We may have to adjust the dose for her CKD  The only other possible affordable solu

## 2019-11-20 NOTE — TELEPHONE ENCOUNTER
I looked for [employer] coupons for both Januvia and Onglyza, both appear to be about $400 for a one-month supply, and 's coupons seem to have the same Medicare restriction.      Should patient be scheduled for OV here, or have insulin ordered and r

## 2019-11-21 NOTE — TELEPHONE ENCOUNTER
Follow up here  Have pt bring all of her med bottles she is currently taking  Have her record a blood sugar every other day in morning FL fasting and every other day 2hpp largest meal  F/u 2 weeks

## 2019-11-25 NOTE — TELEPHONE ENCOUNTER
Left detailed message per HIPAA.   Advised to call back to schedule 2 week f/u\    Advised to bring med bottles currently taking & to monitor fasting glucose every other morning & glucose every other day 2hpp largest meal.    PSR - Please schedule pt for  2

## 2019-12-11 NOTE — TELEPHONE ENCOUNTER
Letter sent with request for patient to call to schedule follow up appointment and with Dr. Amy Ferrer blood sugar monitoring recommendations as per her note below.

## 2019-12-12 NOTE — TELEPHONE ENCOUNTER
SHERI pt returned call informed pt overdue for 2 week follow up and 's monitoring recommendations (pt stated she was aware of these recommendations and will call back to schedule apt.)

## 2019-12-17 ENCOUNTER — OFFICE VISIT (OUTPATIENT)
Dept: INTERNAL MEDICINE CLINIC | Facility: CLINIC | Age: 84
End: 2019-12-17
Payer: MEDICARE

## 2019-12-17 VITALS
RESPIRATION RATE: 16 BRPM | BODY MASS INDEX: 27.54 KG/M2 | SYSTOLIC BLOOD PRESSURE: 124 MMHG | OXYGEN SATURATION: 99 % | HEART RATE: 63 BPM | HEIGHT: 64.5 IN | WEIGHT: 163.31 LBS | DIASTOLIC BLOOD PRESSURE: 76 MMHG

## 2019-12-17 DIAGNOSIS — R55 VASOVAGAL SYNCOPE: ICD-10-CM

## 2019-12-17 DIAGNOSIS — F33.0 MILD RECURRENT MAJOR DEPRESSION (HCC): ICD-10-CM

## 2019-12-17 DIAGNOSIS — S22.32XA CLOSED FRACTURE OF ONE RIB OF LEFT SIDE, INITIAL ENCOUNTER: ICD-10-CM

## 2019-12-17 DIAGNOSIS — E11.649 HYPOGLYCEMIA ASSOCIATED WITH TYPE 2 DIABETES MELLITUS (HCC): ICD-10-CM

## 2019-12-17 DIAGNOSIS — I70.0 TYPE 2 DIABETES MELLITUS WITH ATHEROSCLEROSIS OF AORTA (HCC): Primary | ICD-10-CM

## 2019-12-17 DIAGNOSIS — E11.51 TYPE 2 DIABETES MELLITUS WITH ATHEROSCLEROSIS OF AORTA (HCC): Primary | ICD-10-CM

## 2019-12-17 PROCEDURE — 99214 OFFICE O/P EST MOD 30 MIN: CPT | Performed by: INTERNAL MEDICINE

## 2019-12-17 RX ORDER — METFORMIN HYDROCHLORIDE 500 MG/1
TABLET, EXTENDED RELEASE ORAL
Qty: 360 TABLET | Refills: 1 | Status: SHIPPED | OUTPATIENT
Start: 2019-12-17 | End: 2020-07-17

## 2019-12-17 NOTE — PROGRESS NOTES
HPI:   Lavern Chan is a 80year old female who presents for recheck of her diabetes. , short term f/u, and for depression   She is watching what  She eats very carefully and reduced her glimeperide to 1 mg BID, her morning sugars are running 125-158  She apply Misc Test one time daily and as needed 100 each 3   • Glucose Blood (ONETOUCH ULTRA BLUE) In Vitro Strip USE TO TEST BLOOD SUGERS DAILY AND AS NEEDED 100 strip 3   • aspirin 325 MG Oral Tab Take 325 mg by mouth daily.                Patient Active Pro edema      Results for orders placed or performed in visit on 69/78/58   COMP METABOLIC PANEL (14)   Result Value Ref Range    Glucose 197 (H) 70 - 99 mg/dL    Sodium 140 136 - 145 mmol/L    Potassium 4.4 3.5 - 5.1 mmol/L    Chloride 108 98 - 112 mmol/L this Visit:  Requested Prescriptions     Signed Prescriptions Disp Refills   • metFORMIN HCl  MG Oral Tablet 24 Hr 360 tablet 1     Si daily       Imaging & Consults:  None    There are no Patient Instructions on file for this visit.     No follow

## 2019-12-22 DIAGNOSIS — I10 ESSENTIAL HYPERTENSION: ICD-10-CM

## 2019-12-23 RX ORDER — HYDROCHLOROTHIAZIDE 25 MG/1
TABLET ORAL
Qty: 90 TABLET | Refills: 0 | Status: SHIPPED | OUTPATIENT
Start: 2019-12-23 | End: 2020-03-30

## 2020-02-13 ENCOUNTER — TELEPHONE (OUTPATIENT)
Dept: INTERNAL MEDICINE CLINIC | Facility: CLINIC | Age: 85
End: 2020-02-13

## 2020-02-13 NOTE — TELEPHONE ENCOUNTER
Called and left detailed message per HIPAA advising patient that if she also received a similar letter that she can disregard as a DEXA scan is no longer indicated for her.     Also advised patient of labs ordered, and Dr. Myles Beard request to see her for DM

## 2020-02-13 NOTE — TELEPHONE ENCOUNTER
Just labs and DM f/u in march,   We can schedule her supervisit for any time after that, thanks  Prefer not to do DM , CCM at supervisit appts

## 2020-02-13 NOTE — TELEPHONE ENCOUNTER
Received notice from pts insurance that she is due for a DEXA. Last DEXA on file is from 2013. Pt last OV on 12/17/19 was switched from supervisit to DM check due to poor DM control. No follow up noted but labs ordered for 3 months.   Would you like nex

## 2020-02-14 NOTE — TELEPHONE ENCOUNTER
Patient returned call. Had not listened to detailed message left yesterday. Patient advised of information left in detailed message. Offered to help patient schedule appointment for DM check in March, patient declined saying she'll need to call back.  Dash

## 2020-02-17 ENCOUNTER — TELEPHONE (OUTPATIENT)
Dept: INTERNAL MEDICINE CLINIC | Facility: CLINIC | Age: 85
End: 2020-02-17

## 2020-02-17 NOTE — TELEPHONE ENCOUNTER
Francesco Media would like a call back to discuss treatment for Pt and to talk about getting Pt a DEXA scan ordered.    Please advise  Thank you

## 2020-02-18 NOTE — TELEPHONE ENCOUNTER
Last OV relevant to medication: 12/17/19 mult issues  Last refill date: 10/29/19    #/refills: 90/0  When pt was asked to return for OV: none  Upcoming appt/reason: none

## 2020-02-18 NOTE — TELEPHONE ENCOUNTER
Called and left message on confidential voicemail for Hilda Hopkins that per Dr. Kinjal House, DEXA scan is not indicated for patient, and of plan for labs and DM check in March and supervisit to follow.  Macrina Cheatham that if there are any further questions or concerns t

## 2020-02-21 NOTE — TELEPHONE ENCOUNTER
Spoke to [place] from [place]. [place] is following up on reason why Dr. Christian Rivas did not order a DEXA. Advised that d/t pt's age, it is not indicated. See 2/13/2020 TE. [place] states if pt would like in home dexa that it would be covered.   [place] states

## 2020-02-23 DIAGNOSIS — M10.9 GOUT, UNSPECIFIED CAUSE, UNSPECIFIED CHRONICITY, UNSPECIFIED SITE: Primary | ICD-10-CM

## 2020-02-25 RX ORDER — ALLOPURINOL 100 MG/1
TABLET ORAL
Qty: 90 TABLET | Refills: 1 | Status: SHIPPED | OUTPATIENT
Start: 2020-02-25 | End: 2020-09-29

## 2020-02-25 NOTE — TELEPHONE ENCOUNTER
Last OV relevant to medication: 12/17/2019  Last refill date: 7/23/2019     #/refills: 90/1  When pt was asked to return for OV: 3 months - DM  Upcoming appt/reason: 3/26/2020 - DM + Labs      Lab Results   Component Value Date    24VOL 1,450 04/25/2018

## 2020-03-18 ENCOUNTER — APPOINTMENT (OUTPATIENT)
Dept: LAB | Facility: HOSPITAL | Age: 85
End: 2020-03-18
Attending: INTERNAL MEDICINE
Payer: MEDICARE

## 2020-03-18 DIAGNOSIS — I70.0 TYPE 2 DIABETES MELLITUS WITH ATHEROSCLEROSIS OF AORTA (HCC): ICD-10-CM

## 2020-03-18 DIAGNOSIS — E11.51 TYPE 2 DIABETES MELLITUS WITH ATHEROSCLEROSIS OF AORTA (HCC): ICD-10-CM

## 2020-03-18 LAB
ALBUMIN SERPL-MCNC: 3.5 G/DL (ref 3.4–5)
ALBUMIN/GLOB SERPL: 0.9 {RATIO} (ref 1–2)
ALP LIVER SERPL-CCNC: 80 U/L (ref 55–142)
ALT SERPL-CCNC: 19 U/L (ref 13–56)
ANION GAP SERPL CALC-SCNC: 6 MMOL/L (ref 0–18)
AST SERPL-CCNC: 14 U/L (ref 15–37)
BILIRUB SERPL-MCNC: 0.6 MG/DL (ref 0.1–2)
BUN BLD-MCNC: 39 MG/DL (ref 7–18)
BUN/CREAT SERPL: 25.5 (ref 10–20)
CALCIUM BLD-MCNC: 9.8 MG/DL (ref 8.5–10.1)
CHLORIDE SERPL-SCNC: 108 MMOL/L (ref 98–112)
CHOLEST SMN-MCNC: 127 MG/DL (ref ?–200)
CO2 SERPL-SCNC: 25 MMOL/L (ref 21–32)
CREAT BLD-MCNC: 1.53 MG/DL (ref 0.55–1.02)
CREAT UR-SCNC: 115 MG/DL
EST. AVERAGE GLUCOSE BLD GHB EST-MCNC: 177 MG/DL (ref 68–126)
GLOBULIN PLAS-MCNC: 4.1 G/DL (ref 2.8–4.4)
GLUCOSE BLD-MCNC: 129 MG/DL (ref 70–99)
HBA1C MFR BLD HPLC: 7.8 % (ref ?–5.7)
HDLC SERPL-MCNC: 50 MG/DL (ref 40–59)
LDLC SERPL CALC-MCNC: 48 MG/DL (ref ?–100)
M PROTEIN MFR SERPL ELPH: 7.6 G/DL (ref 6.4–8.2)
MICROALBUMIN UR-MCNC: 1.22 MG/DL
MICROALBUMIN/CREAT 24H UR-RTO: 10.6 UG/MG (ref ?–30)
NONHDLC SERPL-MCNC: 77 MG/DL (ref ?–130)
OSMOLALITY SERPL CALC.SUM OF ELEC: 299 MOSM/KG (ref 275–295)
PATIENT FASTING Y/N/NP: YES
PATIENT FASTING Y/N/NP: YES
POTASSIUM SERPL-SCNC: 4.6 MMOL/L (ref 3.5–5.1)
SODIUM SERPL-SCNC: 139 MMOL/L (ref 136–145)
TRIGL SERPL-MCNC: 143 MG/DL (ref 30–149)
VLDLC SERPL CALC-MCNC: 29 MG/DL (ref 0–30)

## 2020-03-18 PROCEDURE — 83036 HEMOGLOBIN GLYCOSYLATED A1C: CPT

## 2020-03-18 PROCEDURE — 82043 UR ALBUMIN QUANTITATIVE: CPT

## 2020-03-18 PROCEDURE — 82570 ASSAY OF URINE CREATININE: CPT

## 2020-03-18 PROCEDURE — 80053 COMPREHEN METABOLIC PANEL: CPT

## 2020-03-18 PROCEDURE — 80061 LIPID PANEL: CPT

## 2020-03-18 PROCEDURE — 36415 COLL VENOUS BLD VENIPUNCTURE: CPT

## 2020-03-19 DIAGNOSIS — I70.0 TYPE 2 DIABETES MELLITUS WITH ATHEROSCLEROSIS OF AORTA (HCC): Primary | ICD-10-CM

## 2020-03-19 DIAGNOSIS — E11.51 TYPE 2 DIABETES MELLITUS WITH ATHEROSCLEROSIS OF AORTA (HCC): Primary | ICD-10-CM

## 2020-03-20 ENCOUNTER — TELEPHONE (OUTPATIENT)
Dept: INTERNAL MEDICINE CLINIC | Facility: CLINIC | Age: 85
End: 2020-03-20

## 2020-03-20 DIAGNOSIS — I10 ESSENTIAL HYPERTENSION: Primary | ICD-10-CM

## 2020-03-20 RX ORDER — METOPROLOL SUCCINATE 25 MG/1
12.5 TABLET, EXTENDED RELEASE ORAL DAILY
Qty: 45 TABLET | Refills: 0 | Status: SHIPPED | OUTPATIENT
Start: 2020-03-20 | End: 2020-06-29

## 2020-03-25 NOTE — TELEPHONE ENCOUNTER
Received fax from [facility] that stated that there is a lower tier alternative medication than Metoprolol. Called [facility] pharmacy, cost to pt for Metoprolol is 8. 14. Would you like to change medications? [employer] form given to Dr. Phebe Brittle.

## 2020-03-27 NOTE — TELEPHONE ENCOUNTER
Notice of Approval    Incoming (mail or fax):  fax  Received from:  [facility]  Documentation given to:  triage

## 2020-03-27 NOTE — TELEPHONE ENCOUNTER
Spoke with pt. She is aware that Dr. Deven Winter states there is no substitute medication for her Metoprolol. Pt used to pay nothing for metoprolol and is aware her current cost will be $8.14. She will be able to afford this med.

## 2020-03-27 NOTE — TELEPHONE ENCOUNTER
Request for Additional Information    Incoming (mail or fax):  fax  Received from:  [facility]  Documentation given to:  triage

## 2020-03-27 NOTE — TELEPHONE ENCOUNTER
[facility] request for additional information regarding alternative to  Metoprolol was faxed back to [facility] at Fax: [phone]. Confirmation received.  Form to scan

## 2020-03-29 DIAGNOSIS — I10 ESSENTIAL HYPERTENSION: ICD-10-CM

## 2020-03-30 RX ORDER — HYDROCHLOROTHIAZIDE 25 MG/1
TABLET ORAL
Qty: 90 TABLET | Refills: 0 | Status: SHIPPED | OUTPATIENT
Start: 2020-03-30 | End: 2020-06-29

## 2020-04-01 ENCOUNTER — MED REC SCAN ONLY (OUTPATIENT)
Dept: INTERNAL MEDICINE CLINIC | Facility: CLINIC | Age: 85
End: 2020-04-01

## 2020-05-12 RX ORDER — ATORVASTATIN CALCIUM 40 MG/1
TABLET, FILM COATED ORAL
Qty: 90 TABLET | Refills: 0 | Status: SHIPPED | OUTPATIENT
Start: 2020-05-12 | End: 2020-08-04

## 2020-06-01 DIAGNOSIS — I70.0 TYPE 2 DIABETES MELLITUS WITH ATHEROSCLEROSIS OF AORTA (HCC): Primary | ICD-10-CM

## 2020-06-01 DIAGNOSIS — E11.51 TYPE 2 DIABETES MELLITUS WITH ATHEROSCLEROSIS OF AORTA (HCC): Primary | ICD-10-CM

## 2020-06-01 DIAGNOSIS — F33.1 DEPRESSION, MAJOR, RECURRENT, MODERATE (HCC): ICD-10-CM

## 2020-06-01 RX ORDER — BLOOD SUGAR DIAGNOSTIC
STRIP MISCELLANEOUS
Qty: 100 STRIP | Refills: 0 | OUTPATIENT
Start: 2020-06-01

## 2020-06-10 DIAGNOSIS — E11.51 TYPE 2 DIABETES MELLITUS WITH ATHEROSCLEROSIS OF AORTA (HCC): Primary | ICD-10-CM

## 2020-06-10 DIAGNOSIS — I70.0 TYPE 2 DIABETES MELLITUS WITH ATHEROSCLEROSIS OF AORTA (HCC): Primary | ICD-10-CM

## 2020-06-10 DIAGNOSIS — E11.9 TYPE 2 DIABETES MELLITUS WITHOUT COMPLICATION, WITHOUT LONG-TERM CURRENT USE OF INSULIN (HCC): ICD-10-CM

## 2020-06-12 RX ORDER — LANCETS 33 GAUGE
EACH MISCELLANEOUS
Qty: 1000 EACH | Refills: 3 | Status: SHIPPED | OUTPATIENT
Start: 2020-06-12 | End: 2021-08-10

## 2020-06-29 DIAGNOSIS — I10 ESSENTIAL HYPERTENSION: ICD-10-CM

## 2020-06-29 RX ORDER — HYDROCHLOROTHIAZIDE 25 MG/1
TABLET ORAL
Qty: 90 TABLET | Refills: 0 | Status: SHIPPED | OUTPATIENT
Start: 2020-06-29 | End: 2020-10-13

## 2020-06-29 RX ORDER — METOPROLOL SUCCINATE 25 MG/1
TABLET, EXTENDED RELEASE ORAL
Qty: 45 TABLET | Refills: 0 | Status: SHIPPED | OUTPATIENT
Start: 2020-06-29 | End: 2020-09-23

## 2020-07-15 ENCOUNTER — APPOINTMENT (OUTPATIENT)
Dept: LAB | Facility: HOSPITAL | Age: 85
End: 2020-07-15
Attending: INTERNAL MEDICINE
Payer: MEDICARE

## 2020-07-15 DIAGNOSIS — I70.0 TYPE 2 DIABETES MELLITUS WITH ATHEROSCLEROSIS OF AORTA (HCC): ICD-10-CM

## 2020-07-15 DIAGNOSIS — E11.51 TYPE 2 DIABETES MELLITUS WITH ATHEROSCLEROSIS OF AORTA (HCC): ICD-10-CM

## 2020-07-15 LAB
ALBUMIN SERPL-MCNC: 3.6 G/DL (ref 3.4–5)
ALBUMIN/GLOB SERPL: 0.9 {RATIO} (ref 1–2)
ALP LIVER SERPL-CCNC: 72 U/L (ref 55–142)
ALT SERPL-CCNC: 17 U/L (ref 13–56)
ANION GAP SERPL CALC-SCNC: 6 MMOL/L (ref 0–18)
AST SERPL-CCNC: 13 U/L (ref 15–37)
BILIRUB SERPL-MCNC: 0.6 MG/DL (ref 0.1–2)
BUN BLD-MCNC: 37 MG/DL (ref 7–18)
BUN/CREAT SERPL: 22.8 (ref 10–20)
CALCIUM BLD-MCNC: 9.8 MG/DL (ref 8.5–10.1)
CHLORIDE SERPL-SCNC: 109 MMOL/L (ref 98–112)
CHOLEST SMN-MCNC: 129 MG/DL (ref ?–200)
CO2 SERPL-SCNC: 25 MMOL/L (ref 21–32)
CREAT BLD-MCNC: 1.62 MG/DL (ref 0.55–1.02)
EST. AVERAGE GLUCOSE BLD GHB EST-MCNC: 157 MG/DL (ref 68–126)
GLOBULIN PLAS-MCNC: 4.1 G/DL (ref 2.8–4.4)
GLUCOSE BLD-MCNC: 136 MG/DL (ref 70–99)
HBA1C MFR BLD HPLC: 7.1 % (ref ?–5.7)
HDLC SERPL-MCNC: 57 MG/DL (ref 40–59)
LDLC SERPL CALC-MCNC: 46 MG/DL (ref ?–100)
M PROTEIN MFR SERPL ELPH: 7.7 G/DL (ref 6.4–8.2)
NONHDLC SERPL-MCNC: 72 MG/DL (ref ?–130)
OSMOLALITY SERPL CALC.SUM OF ELEC: 301 MOSM/KG (ref 275–295)
PATIENT FASTING Y/N/NP: YES
PATIENT FASTING Y/N/NP: YES
POTASSIUM SERPL-SCNC: 4.5 MMOL/L (ref 3.5–5.1)
SODIUM SERPL-SCNC: 140 MMOL/L (ref 136–145)
TRIGL SERPL-MCNC: 132 MG/DL (ref 30–149)
VLDLC SERPL CALC-MCNC: 26 MG/DL (ref 0–30)

## 2020-07-15 PROCEDURE — 36415 COLL VENOUS BLD VENIPUNCTURE: CPT

## 2020-07-15 PROCEDURE — 83036 HEMOGLOBIN GLYCOSYLATED A1C: CPT

## 2020-07-15 PROCEDURE — 80061 LIPID PANEL: CPT

## 2020-07-15 PROCEDURE — 80053 COMPREHEN METABOLIC PANEL: CPT

## 2020-07-17 RX ORDER — METFORMIN HYDROCHLORIDE 500 MG/1
TABLET, EXTENDED RELEASE ORAL
Qty: 360 TABLET | Refills: 0 | Status: SHIPPED | OUTPATIENT
Start: 2020-07-17 | End: 2020-10-12

## 2020-07-21 ENCOUNTER — OFFICE VISIT (OUTPATIENT)
Dept: INTERNAL MEDICINE CLINIC | Facility: CLINIC | Age: 85
End: 2020-07-21
Payer: MEDICARE

## 2020-07-21 VITALS
SYSTOLIC BLOOD PRESSURE: 116 MMHG | WEIGHT: 161.69 LBS | HEART RATE: 74 BPM | OXYGEN SATURATION: 97 % | DIASTOLIC BLOOD PRESSURE: 58 MMHG | BODY MASS INDEX: 26.94 KG/M2 | HEIGHT: 65 IN | RESPIRATION RATE: 14 BRPM | TEMPERATURE: 98 F

## 2020-07-21 DIAGNOSIS — Z86.73 HISTORY OF STROKE: ICD-10-CM

## 2020-07-21 DIAGNOSIS — E11.42 DIABETIC POLYNEUROPATHY ASSOCIATED WITH TYPE 2 DIABETES MELLITUS (HCC): ICD-10-CM

## 2020-07-21 DIAGNOSIS — I70.0 TYPE 2 DIABETES MELLITUS WITH ATHEROSCLEROSIS OF AORTA (HCC): ICD-10-CM

## 2020-07-21 DIAGNOSIS — I65.23 BILATERAL CAROTID ARTERY OCCLUSION: ICD-10-CM

## 2020-07-21 DIAGNOSIS — Z00.00 ENCOUNTER FOR ANNUAL HEALTH EXAMINATION: Primary | ICD-10-CM

## 2020-07-21 DIAGNOSIS — F33.0 MILD RECURRENT MAJOR DEPRESSION (HCC): ICD-10-CM

## 2020-07-21 DIAGNOSIS — I10 ESSENTIAL HYPERTENSION: ICD-10-CM

## 2020-07-21 DIAGNOSIS — N18.4 CKD STAGE 4 DUE TO TYPE 2 DIABETES MELLITUS (HCC): ICD-10-CM

## 2020-07-21 DIAGNOSIS — E11.22 CKD STAGE 4 DUE TO TYPE 2 DIABETES MELLITUS (HCC): ICD-10-CM

## 2020-07-21 DIAGNOSIS — E11.51 TYPE 2 DIABETES MELLITUS WITH ATHEROSCLEROSIS OF AORTA (HCC): ICD-10-CM

## 2020-07-21 DIAGNOSIS — I35.0 AORTIC STENOSIS, MILD: ICD-10-CM

## 2020-07-21 PROBLEM — N18.30 CKD (CHRONIC KIDNEY DISEASE) STAGE 3, GFR 30-59 ML/MIN (HCC): Status: ACTIVE | Noted: 2020-07-21

## 2020-07-21 PROBLEM — M10.9 GOUT: Status: RESOLVED | Noted: 2019-01-29 | Resolved: 2020-07-21

## 2020-07-21 PROBLEM — N18.30 CKD (CHRONIC KIDNEY DISEASE) STAGE 3, GFR 30-59 ML/MIN (HCC): Status: RESOLVED | Noted: 2020-07-21 | Resolved: 2020-07-21

## 2020-07-21 PROBLEM — Z86.19 HISTORY OF HELICOBACTER PYLORI INFECTION: Status: RESOLVED | Noted: 2019-02-05 | Resolved: 2020-07-21

## 2020-07-21 PROBLEM — I12.9 CKD STAGE 4 SECONDARY TO HYPERTENSION (HCC): Chronic | Status: RESOLVED | Noted: 2019-01-29 | Resolved: 2020-07-21

## 2020-07-21 PROCEDURE — G0439 PPPS, SUBSEQ VISIT: HCPCS | Performed by: INTERNAL MEDICINE

## 2020-07-21 PROCEDURE — 3074F SYST BP LT 130 MM HG: CPT | Performed by: INTERNAL MEDICINE

## 2020-07-21 PROCEDURE — 3078F DIAST BP <80 MM HG: CPT | Performed by: INTERNAL MEDICINE

## 2020-07-21 PROCEDURE — 99397 PER PM REEVAL EST PAT 65+ YR: CPT | Performed by: INTERNAL MEDICINE

## 2020-07-21 PROCEDURE — 96160 PT-FOCUSED HLTH RISK ASSMT: CPT | Performed by: INTERNAL MEDICINE

## 2020-07-21 PROCEDURE — 3008F BODY MASS INDEX DOCD: CPT | Performed by: INTERNAL MEDICINE

## 2020-07-21 RX ORDER — GLIMEPIRIDE 1 MG/1
1 TABLET ORAL
Qty: 90 TABLET | Refills: 1 | Status: SHIPPED | OUTPATIENT
Start: 2020-07-21 | End: 2021-06-16

## 2020-07-21 NOTE — PROGRESS NOTES
HPI:   Leroy Patton is a 80year old female who presents for a MA (Medicare Advantage) [facility] (Once per calendar year).     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  Social History    Tobacco Use      Smoking status: Former Smoker        Quit date: 1998        Years since quittin.2      Smokeless tobacco: Never Used       Ms. Johnie Caballero already takes aspirin and has it on her medication list.   CAGE Alcohol 500 MG Oral Tablet 24 Hr, TAKE 4 TABLETS BY MOUTH EVERY DAY  HYDROCHLOROTHIAZIDE 25 MG Oral Tab, TAKE 1 TABLET BY MOUTH EVERY DAY  METOPROLOL SUCCINATE ER 25 MG Oral Tablet 24 Hr, TAKE 1/2 TABLET BY MOUTH EVERY DAY  ONETOSUNIL NOGUEIRA [address]W Does not sola disease in her sister. SOCIAL HISTORY:   She  reports that she quit smoking about 22 years ago. She has never used smokeless tobacco. She reports that she does not drink alcohol or use drugs.      REVIEW OF SYSTEMS:      General- feels well, good energy, turgor normal, innumerable age-related growths including SKs   Lymph nodes: Cervical, supraclavicular, and axillary nodes normal   Neurologic: Normal       Vaccination History     There is no immunization history on file for this patient.      ASSESSMENT AN your daily physical activity?: (P) Moderate  How would you describe your current health state?: (P) Fair  How do you maintain positive mental well-being?: (P) Social Interaction;Puzzles;Games; Visiting Family      This section provided for quick review of c discussed There are no preventive care reminders to display for this patient.  Update Health Maintenance if applicable     Immunizations (Update Immunization Activity if applicable)     Influenza  Covered Annually  Please get every year    Pneumococcal 13 ( found.     Dilated Eye exam  Annually Data entered on: 3/19/2019   Last Dilated Eye Exam 3/19/2019     No flowsheet data found.             Template: WENDY PALACIOS MEDICARE ANNUAL ASSESSMENT FEMALE [[id number]]

## 2020-07-21 NOTE — PATIENT INSTRUCTIONS
Shailesh Khanna's SCREENING SCHEDULE   Tests on this list are recommended by your physician but may not be covered, or covered at this frequency, by your insurer. Please check with your insurance carrier before scheduling to verify coverage.    PREVENTATIV years- more often if abnormal There are no preventive care reminders to display for this patient. Update Health Maintenance if applicable    Flex Sigmoidoscopy Screen  Covered every 5 years No results found for this or any previous visit.  No flowsheet data (Prevnar)  Covered Once after 65 No orders found for this or any previous visit. Please get once after your 65th birthday    Pneumococcal 23 (Pneumovax)  Covered Once after 65 No orders found for this or any previous visit.  Please get once after your 65th

## 2020-08-04 RX ORDER — ATORVASTATIN CALCIUM 40 MG/1
TABLET, FILM COATED ORAL
Qty: 90 TABLET | Refills: 1 | Status: SHIPPED | OUTPATIENT
Start: 2020-08-04 | End: 2021-01-26

## 2020-09-21 RX ORDER — BLOOD SUGAR DIAGNOSTIC
STRIP MISCELLANEOUS
Qty: 100 STRIP | Refills: 0 | Status: SHIPPED | OUTPATIENT
Start: 2020-09-21 | End: 2020-12-16

## 2020-09-23 DIAGNOSIS — I10 ESSENTIAL HYPERTENSION: ICD-10-CM

## 2020-09-23 RX ORDER — METOPROLOL SUCCINATE 25 MG/1
TABLET, EXTENDED RELEASE ORAL
Qty: 45 TABLET | Refills: 0 | Status: SHIPPED | OUTPATIENT
Start: 2020-09-23 | End: 2020-12-16

## 2020-09-24 NOTE — TELEPHONE ENCOUNTER
Last OV relevant to medication: 07/21/2020  Last refill date: 05/18/2020     #/refills: 90/0  When pt was asked to return for OV: 6 mo/DM  Upcoming appt/reason: Nothing scheduled.

## 2020-09-26 DIAGNOSIS — M10.9 GOUT, UNSPECIFIED CAUSE, UNSPECIFIED CHRONICITY, UNSPECIFIED SITE: ICD-10-CM

## 2020-09-29 RX ORDER — ALLOPURINOL 100 MG/1
TABLET ORAL
Qty: 90 TABLET | Refills: 1 | Status: SHIPPED | OUTPATIENT
Start: 2020-09-29 | End: 2021-02-16

## 2020-09-29 NOTE — TELEPHONE ENCOUNTER
Last OV relevant to medication: 07/2020 - Supervisit  Last refill date: 2/25/2020     #/refills: 90/1  When pt was asked to return for OV: 6 months - DM ck  Upcoming appt/reason: None

## 2020-10-12 DIAGNOSIS — I10 ESSENTIAL HYPERTENSION: ICD-10-CM

## 2020-10-12 RX ORDER — METFORMIN HYDROCHLORIDE 500 MG/1
TABLET, EXTENDED RELEASE ORAL
Qty: 360 TABLET | Refills: 0 | Status: SHIPPED | OUTPATIENT
Start: 2020-10-12 | End: 2021-01-05

## 2020-10-13 RX ORDER — HYDROCHLOROTHIAZIDE 25 MG/1
TABLET ORAL
Qty: 90 TABLET | Refills: 0 | Status: SHIPPED | OUTPATIENT
Start: 2020-10-13 | End: 2021-01-11

## 2020-10-28 ENCOUNTER — TELEPHONE (OUTPATIENT)
Dept: INTERNAL MEDICINE CLINIC | Facility: CLINIC | Age: 85
End: 2020-10-28

## 2020-10-28 NOTE — TELEPHONE ENCOUNTER
Incoming (mail or fax):  fax  Received from:  [facility]  Documentation given to:  [facility]

## 2020-10-29 ENCOUNTER — TELEPHONE (OUTPATIENT)
Dept: INTERNAL MEDICINE CLINIC | Facility: CLINIC | Age: 85
End: 2020-10-29

## 2020-10-29 NOTE — TELEPHONE ENCOUNTER
Received 2020 DM Eye Exam from Dr. Matti Dan @ [facility]. Updated in Epic and Sent to Scan.

## 2020-11-16 ENCOUNTER — MED REC SCAN ONLY (OUTPATIENT)
Dept: INTERNAL MEDICINE CLINIC | Facility: CLINIC | Age: 85
End: 2020-11-16

## 2020-12-16 DIAGNOSIS — E11.51 TYPE 2 DIABETES MELLITUS WITH ATHEROSCLEROSIS OF AORTA (HCC): Primary | ICD-10-CM

## 2020-12-16 DIAGNOSIS — I10 ESSENTIAL HYPERTENSION: ICD-10-CM

## 2020-12-16 DIAGNOSIS — I70.0 TYPE 2 DIABETES MELLITUS WITH ATHEROSCLEROSIS OF AORTA (HCC): Primary | ICD-10-CM

## 2020-12-16 RX ORDER — BLOOD SUGAR DIAGNOSTIC
STRIP MISCELLANEOUS
Qty: 100 STRIP | Refills: 0 | Status: SHIPPED | OUTPATIENT
Start: 2020-12-16 | End: 2021-03-22

## 2020-12-16 RX ORDER — METOPROLOL SUCCINATE 25 MG/1
TABLET, EXTENDED RELEASE ORAL
Qty: 45 TABLET | Refills: 0 | Status: SHIPPED | OUTPATIENT
Start: 2020-12-16 | End: 2021-03-22

## 2020-12-20 DIAGNOSIS — I10 ESSENTIAL HYPERTENSION: ICD-10-CM

## 2020-12-21 RX ORDER — HYDROCHLOROTHIAZIDE 25 MG/1
TABLET ORAL
Qty: 90 TABLET | Refills: 0 | OUTPATIENT
Start: 2020-12-21

## 2020-12-22 ENCOUNTER — TELEPHONE (OUTPATIENT)
Dept: INTERNAL MEDICINE CLINIC | Facility: CLINIC | Age: 85
End: 2020-12-22

## 2020-12-22 DIAGNOSIS — I10 ESSENTIAL HYPERTENSION: ICD-10-CM

## 2020-12-22 NOTE — TELEPHONE ENCOUNTER
Pt rec'd denial for  ATORVASTATIN 40 MG Oral Tab AND HYDROCHLOROTHIAZIDE 25 MG Oral Tab. Pt has about 6 left, [facility] [address] [facility] [place]. Pt has appt for dm check with Dr Jabier Garrett 1/26/21.  Thank you

## 2020-12-23 NOTE — TELEPHONE ENCOUNTER
Noted Atorvastatin was refilled on 8/4/2020 for 90 day supply + 1 refill.  & HCTZ was refilled on 10/13/2020 for 90 day supply, 0 refill. Spoke to pharmacist at [place] [address].   Confirmed Atorvastatin refill is already ready for  for a 90 day suppl

## 2020-12-23 NOTE — TELEPHONE ENCOUNTER
Patient called to follow up on this request since she will be out of medication over the weekend. Please advise. Thank you!

## 2021-01-05 DIAGNOSIS — E11.51 TYPE 2 DIABETES MELLITUS WITH ATHEROSCLEROSIS OF AORTA (HCC): Primary | ICD-10-CM

## 2021-01-05 DIAGNOSIS — I70.0 TYPE 2 DIABETES MELLITUS WITH ATHEROSCLEROSIS OF AORTA (HCC): Primary | ICD-10-CM

## 2021-01-05 RX ORDER — METFORMIN HYDROCHLORIDE 500 MG/1
TABLET, EXTENDED RELEASE ORAL
Qty: 360 TABLET | Refills: 0 | Status: SHIPPED | OUTPATIENT
Start: 2021-01-05 | End: 2021-04-02

## 2021-01-10 DIAGNOSIS — I10 ESSENTIAL HYPERTENSION: ICD-10-CM

## 2021-01-11 RX ORDER — HYDROCHLOROTHIAZIDE 25 MG/1
TABLET ORAL
Qty: 90 TABLET | Refills: 0 | Status: SHIPPED | OUTPATIENT
Start: 2021-01-11 | End: 2021-01-26

## 2021-01-15 ENCOUNTER — LAB ENCOUNTER (OUTPATIENT)
Dept: LAB | Facility: HOSPITAL | Age: 86
End: 2021-01-15
Attending: INTERNAL MEDICINE
Payer: MEDICARE

## 2021-01-15 ENCOUNTER — TELEPHONE (OUTPATIENT)
Dept: INTERNAL MEDICINE CLINIC | Facility: CLINIC | Age: 86
End: 2021-01-15

## 2021-01-15 DIAGNOSIS — E11.51 TYPE 2 DIABETES MELLITUS WITH ATHEROSCLEROSIS OF AORTA (HCC): ICD-10-CM

## 2021-01-15 DIAGNOSIS — I70.0 TYPE 2 DIABETES MELLITUS WITH ATHEROSCLEROSIS OF AORTA (HCC): ICD-10-CM

## 2021-01-15 LAB
ALBUMIN SERPL-MCNC: 3.9 G/DL (ref 3.4–5)
ALBUMIN/GLOB SERPL: 1 {RATIO} (ref 1–2)
ALP LIVER SERPL-CCNC: 87 U/L
ALT SERPL-CCNC: 24 U/L
ANION GAP SERPL CALC-SCNC: 6 MMOL/L (ref 0–18)
AST SERPL-CCNC: 19 U/L (ref 15–37)
BILIRUB SERPL-MCNC: 0.6 MG/DL (ref 0.1–2)
BUN BLD-MCNC: 49 MG/DL (ref 7–18)
BUN/CREAT SERPL: 27.7 (ref 10–20)
CALCIUM BLD-MCNC: 10.4 MG/DL (ref 8.5–10.1)
CHLORIDE SERPL-SCNC: 109 MMOL/L (ref 98–112)
CHOLEST SMN-MCNC: 93 MG/DL (ref ?–200)
CO2 SERPL-SCNC: 24 MMOL/L (ref 21–32)
CREAT BLD-MCNC: 1.77 MG/DL
CREAT UR-SCNC: 81.5 MG/DL
EST. AVERAGE GLUCOSE BLD GHB EST-MCNC: 140 MG/DL (ref 68–126)
GLOBULIN PLAS-MCNC: 3.9 G/DL (ref 2.8–4.4)
GLUCOSE BLD-MCNC: 114 MG/DL (ref 70–99)
HBA1C MFR BLD HPLC: 6.5 % (ref ?–5.7)
HDLC SERPL-MCNC: 48 MG/DL (ref 40–59)
LDLC SERPL CALC-MCNC: 14 MG/DL (ref ?–100)
M PROTEIN MFR SERPL ELPH: 7.8 G/DL (ref 6.4–8.2)
MICROALBUMIN UR-MCNC: 0.73 MG/DL
MICROALBUMIN/CREAT 24H UR-RTO: 9 UG/MG (ref ?–30)
NONHDLC SERPL-MCNC: 45 MG/DL (ref ?–130)
OSMOLALITY SERPL CALC.SUM OF ELEC: 302 MOSM/KG (ref 275–295)
PATIENT FASTING Y/N/NP: YES
PATIENT FASTING Y/N/NP: YES
POTASSIUM SERPL-SCNC: 4.7 MMOL/L (ref 3.5–5.1)
SODIUM SERPL-SCNC: 139 MMOL/L (ref 136–145)
TRIGL SERPL-MCNC: 153 MG/DL (ref 30–149)
VLDLC SERPL CALC-MCNC: 31 MG/DL (ref 0–30)

## 2021-01-15 PROCEDURE — 82570 ASSAY OF URINE CREATININE: CPT

## 2021-01-15 PROCEDURE — 82043 UR ALBUMIN QUANTITATIVE: CPT

## 2021-01-15 PROCEDURE — 80061 LIPID PANEL: CPT

## 2021-01-15 PROCEDURE — 83036 HEMOGLOBIN GLYCOSYLATED A1C: CPT

## 2021-01-15 PROCEDURE — 80053 COMPREHEN METABOLIC PANEL: CPT

## 2021-01-15 PROCEDURE — 36415 COLL VENOUS BLD VENIPUNCTURE: CPT

## 2021-01-26 ENCOUNTER — HOSPITAL ENCOUNTER (OUTPATIENT)
Dept: GENERAL RADIOLOGY | Age: 86
Discharge: HOME | End: 2021-01-26
Attending: INTERNAL MEDICINE
Payer: MEDICARE

## 2021-01-26 ENCOUNTER — OFFICE VISIT (OUTPATIENT)
Dept: INTERNAL MEDICINE CLINIC | Facility: CLINIC | Age: 86
End: 2021-01-26
Payer: MEDICARE

## 2021-01-26 VITALS
SYSTOLIC BLOOD PRESSURE: 116 MMHG | BODY MASS INDEX: 26.88 KG/M2 | WEIGHT: 161.31 LBS | OXYGEN SATURATION: 95 % | DIASTOLIC BLOOD PRESSURE: 52 MMHG | TEMPERATURE: 97 F | HEIGHT: 65 IN | HEART RATE: 74 BPM | RESPIRATION RATE: 16 BRPM

## 2021-01-26 DIAGNOSIS — M10.371 ACUTE GOUT DUE TO RENAL IMPAIRMENT INVOLVING TOE OF RIGHT FOOT: ICD-10-CM

## 2021-01-26 DIAGNOSIS — E83.52 HYPERCALCEMIA: ICD-10-CM

## 2021-01-26 DIAGNOSIS — M54.41 ACUTE RIGHT-SIDED LOW BACK PAIN WITH RIGHT-SIDED SCIATICA: ICD-10-CM

## 2021-01-26 DIAGNOSIS — E11.42 DIABETIC POLYNEUROPATHY ASSOCIATED WITH TYPE 2 DIABETES MELLITUS (HCC): ICD-10-CM

## 2021-01-26 DIAGNOSIS — N18.4 CKD STAGE 4 DUE TO TYPE 2 DIABETES MELLITUS (HCC): ICD-10-CM

## 2021-01-26 DIAGNOSIS — I70.0 TYPE 2 DIABETES MELLITUS WITH ATHEROSCLEROSIS OF AORTA (HCC): Primary | ICD-10-CM

## 2021-01-26 DIAGNOSIS — E11.22 CKD STAGE 4 DUE TO TYPE 2 DIABETES MELLITUS (HCC): ICD-10-CM

## 2021-01-26 DIAGNOSIS — I10 ESSENTIAL HYPERTENSION: ICD-10-CM

## 2021-01-26 DIAGNOSIS — M79.651 RIGHT THIGH PAIN: ICD-10-CM

## 2021-01-26 DIAGNOSIS — I65.23 BILATERAL CAROTID ARTERY OCCLUSION: ICD-10-CM

## 2021-01-26 DIAGNOSIS — F33.0 MILD RECURRENT MAJOR DEPRESSION (HCC): ICD-10-CM

## 2021-01-26 DIAGNOSIS — E11.51 TYPE 2 DIABETES MELLITUS WITH ATHEROSCLEROSIS OF AORTA (HCC): Primary | ICD-10-CM

## 2021-01-26 PROCEDURE — 3008F BODY MASS INDEX DOCD: CPT | Performed by: INTERNAL MEDICINE

## 2021-01-26 PROCEDURE — 72110 X-RAY EXAM L-2 SPINE 4/>VWS: CPT | Performed by: INTERNAL MEDICINE

## 2021-01-26 PROCEDURE — 3074F SYST BP LT 130 MM HG: CPT | Performed by: INTERNAL MEDICINE

## 2021-01-26 PROCEDURE — 3078F DIAST BP <80 MM HG: CPT | Performed by: INTERNAL MEDICINE

## 2021-01-26 PROCEDURE — 73502 X-RAY EXAM HIP UNI 2-3 VIEWS: CPT | Performed by: INTERNAL MEDICINE

## 2021-01-26 PROCEDURE — 99215 OFFICE O/P EST HI 40 MIN: CPT | Performed by: INTERNAL MEDICINE

## 2021-01-26 RX ORDER — METHYLPREDNISOLONE 4 MG/1
TABLET ORAL
Qty: 1 KIT | Refills: 0 | Status: SHIPPED | OUTPATIENT
Start: 2021-01-26 | End: 2021-02-16 | Stop reason: ALTCHOICE

## 2021-01-26 RX ORDER — ATORVASTATIN CALCIUM 20 MG/1
20 TABLET, FILM COATED ORAL NIGHTLY
Qty: 90 TABLET | Refills: 1 | Status: SHIPPED | OUTPATIENT
Start: 2021-01-26 | End: 2021-07-15

## 2021-01-26 NOTE — PROGRESS NOTES
HPI:   Brennon Mcintosh is a 80year old female who presents for recheck of her diabetes. , also HTN, DL, neuropathy, CKD 4, gout, and more recently, elevated calcium  Her calcum has been similarly elevated in the past then normal again  She denies PU/PD, hea ONETOUCH ULTRA In Vitro Strip TEST BLOOD SUGAR EVERYDAY AND AS NEEDED (E11.5) 100 strip 0   • METOPROLOL SUCCINATE ER 25 MG Oral Tablet 24 Hr TAKE 1/2 TABLET BY MOUTH EVERY DAY 45 tablet 0   • ALLOPURINOL 100 MG Oral Tab TAKE 1 TABLET BY MOUTH EVERY DAY 90 Creatinine 1.77 (H) 0.55 - 1.02 mg/dL    BUN/CREA Ratio 27.7 (H) 10.0 - 20.0    Calcium, Total 10.4 (H) 8.5 - 10.1 mg/dL    Calculated Osmolality 302 (H) 275 - 295 mOsm/kg    GFR, Non- 26 (L) >=60    GFR, -American 30 (L) >=60    AST control  Diabetic polyneuropathy associated with type 2 diabetes mellitus (hcc) stable,   Essential hypertension  Bilateral carotid artery occlusion- check doppler, RF control  Hypercalcemia- stop thiazide, recheck 2 weeks  LBP- xray LS spine, consider PT,

## 2021-01-27 DIAGNOSIS — Z23 NEED FOR VACCINATION: ICD-10-CM

## 2021-01-29 ENCOUNTER — HOSPITAL ENCOUNTER (OUTPATIENT)
Dept: CARDIOLOGY CLINIC | Facility: HOSPITAL | Age: 86
Discharge: HOME | End: 2021-01-29
Attending: INTERNAL MEDICINE
Payer: MEDICARE

## 2021-01-29 DIAGNOSIS — I65.23 BILATERAL CAROTID ARTERY OCCLUSION: ICD-10-CM

## 2021-01-29 DIAGNOSIS — I65.23 BILATERAL CAROTID ARTERY STENOSIS: Primary | ICD-10-CM

## 2021-01-29 PROCEDURE — 93880 EXTRACRANIAL BILAT STUDY: CPT | Performed by: INTERNAL MEDICINE

## 2021-01-30 ENCOUNTER — TELEPHONE (OUTPATIENT)
Dept: INTERNAL MEDICINE CLINIC | Facility: CLINIC | Age: 86
End: 2021-01-30

## 2021-01-30 NOTE — PATIENT INSTRUCTIONS
Treatment for Iliotibial Band Syndrome  Iliotibial band syndrome, or IT band syndrome, is a condition that causes pain on the outside of the knee. It most often occurs in athletes, especially long-distance runners.  It can happen if you cycle, ski, row, o © 7708-7476 [employer] [address]. [facility], [facility] [place]. All rights reserved. This information is not intended as a substitute for professional medical care. Always follow your healthcare professional's instructions.         [place]

## 2021-02-01 ENCOUNTER — IMMUNIZATION (OUTPATIENT)
Dept: LAB | Age: 86
End: 2021-02-01
Attending: HOSPITALIST
Payer: MEDICARE

## 2021-02-01 DIAGNOSIS — Z23 NEED FOR VACCINATION: Primary | ICD-10-CM

## 2021-02-01 PROCEDURE — 0001A SARSCOV2 VAC 30MCG/0.3ML IM: CPT

## 2021-02-12 ENCOUNTER — LAB ENCOUNTER (OUTPATIENT)
Dept: LAB | Age: 86
End: 2021-02-12
Attending: INTERNAL MEDICINE
Payer: MEDICARE

## 2021-02-12 DIAGNOSIS — E83.52 HYPERCALCEMIA: ICD-10-CM

## 2021-02-12 DIAGNOSIS — M10.371 ACUTE GOUT DUE TO RENAL IMPAIRMENT INVOLVING TOE OF RIGHT FOOT: ICD-10-CM

## 2021-02-12 LAB
CALCIUM BLD-MCNC: 10.3 MG/DL (ref 8.5–10.1)
URATE SERPL-MCNC: 7.7 MG/DL

## 2021-02-12 PROCEDURE — 36415 COLL VENOUS BLD VENIPUNCTURE: CPT

## 2021-02-12 PROCEDURE — 82310 ASSAY OF CALCIUM: CPT

## 2021-02-12 PROCEDURE — 84550 ASSAY OF BLOOD/URIC ACID: CPT

## 2021-02-16 ENCOUNTER — OFFICE VISIT (OUTPATIENT)
Dept: INTERNAL MEDICINE CLINIC | Facility: CLINIC | Age: 86
End: 2021-02-16
Payer: MEDICARE

## 2021-02-16 VITALS
SYSTOLIC BLOOD PRESSURE: 124 MMHG | BODY MASS INDEX: 26.52 KG/M2 | HEIGHT: 65 IN | OXYGEN SATURATION: 98 % | WEIGHT: 159.19 LBS | RESPIRATION RATE: 16 BRPM | DIASTOLIC BLOOD PRESSURE: 56 MMHG | HEART RATE: 85 BPM | TEMPERATURE: 97 F

## 2021-02-16 DIAGNOSIS — N18.4 CKD STAGE 4 DUE TO TYPE 2 DIABETES MELLITUS (HCC): Primary | ICD-10-CM

## 2021-02-16 DIAGNOSIS — E83.52 HYPERCALCEMIA: ICD-10-CM

## 2021-02-16 DIAGNOSIS — M76.30 IT BAND SYNDROME, UNSPECIFIED LATERALITY: ICD-10-CM

## 2021-02-16 DIAGNOSIS — E79.0 HYPERURICEMIA: ICD-10-CM

## 2021-02-16 DIAGNOSIS — I10 ESSENTIAL HYPERTENSION: ICD-10-CM

## 2021-02-16 DIAGNOSIS — E11.22 CKD STAGE 4 DUE TO TYPE 2 DIABETES MELLITUS (HCC): Primary | ICD-10-CM

## 2021-02-16 DIAGNOSIS — I65.23 BILATERAL CAROTID ARTERY OCCLUSION: ICD-10-CM

## 2021-02-16 DIAGNOSIS — M10.9 GOUT, UNSPECIFIED CAUSE, UNSPECIFIED CHRONICITY, UNSPECIFIED SITE: ICD-10-CM

## 2021-02-16 PROCEDURE — 3074F SYST BP LT 130 MM HG: CPT | Performed by: INTERNAL MEDICINE

## 2021-02-16 PROCEDURE — 3008F BODY MASS INDEX DOCD: CPT | Performed by: INTERNAL MEDICINE

## 2021-02-16 PROCEDURE — 99214 OFFICE O/P EST MOD 30 MIN: CPT | Performed by: INTERNAL MEDICINE

## 2021-02-16 PROCEDURE — 3078F DIAST BP <80 MM HG: CPT | Performed by: INTERNAL MEDICINE

## 2021-02-16 RX ORDER — ALLOPURINOL 100 MG/1
TABLET ORAL
Qty: 180 TABLET | Refills: 1 | Status: SHIPPED | OUTPATIENT
Start: 2021-02-16 | End: 2021-08-10

## 2021-02-16 NOTE — PROGRESS NOTES
Brennon Mcintosh is a 80year old female. To F/U from last visit regarding multiple issues  HPI:    Interim history:we saw her 3 week ago for f/u and her calcium was found to be elevated.  Also recent gout attack related to beef  We stopped her thiazide and r Alcohol use: No    Drug use: No     Patient Active Problem List:     CKD stage 4 due to type 2 diabetes mellitus (HCC)     Type 2 diabetes mellitus with atherosclerosis of aorta (HCC)     History of stroke     Diabetic polyneuropathy associated with type 2 Patient Instructions on file for this visit. The patient indicates understanding of these issues and agrees to the plan.

## 2021-02-22 ENCOUNTER — IMMUNIZATION (OUTPATIENT)
Dept: LAB | Age: 86
End: 2021-02-22
Attending: HOSPITALIST
Payer: MEDICARE

## 2021-02-22 DIAGNOSIS — Z23 NEED FOR VACCINATION: Primary | ICD-10-CM

## 2021-02-22 PROCEDURE — 0002A SARSCOV2 VAC 30MCG/0.3ML IM: CPT

## 2021-03-02 ENCOUNTER — LAB ENCOUNTER (OUTPATIENT)
Dept: LAB | Age: 86
End: 2021-03-02
Attending: INTERNAL MEDICINE
Payer: MEDICARE

## 2021-03-02 DIAGNOSIS — E79.0 HYPERURICEMIA: ICD-10-CM

## 2021-03-02 DIAGNOSIS — E83.52 HYPERCALCEMIA: ICD-10-CM

## 2021-03-02 DIAGNOSIS — M10.9 GOUT, UNSPECIFIED CAUSE, UNSPECIFIED CHRONICITY, UNSPECIFIED SITE: ICD-10-CM

## 2021-03-02 DIAGNOSIS — N18.4 CKD STAGE 4 DUE TO TYPE 2 DIABETES MELLITUS (HCC): ICD-10-CM

## 2021-03-02 DIAGNOSIS — E11.22 CKD STAGE 4 DUE TO TYPE 2 DIABETES MELLITUS (HCC): ICD-10-CM

## 2021-03-02 LAB
ANION GAP SERPL CALC-SCNC: 9 MMOL/L (ref 0–18)
BUN BLD-MCNC: 34 MG/DL (ref 7–18)
BUN/CREAT SERPL: 23.4 (ref 10–20)
CALCIUM BLD-MCNC: 10.8 MG/DL (ref 8.5–10.1)
CALCIUM BLD-MCNC: 10.9 MG/DL (ref 8.5–10.1)
CHLORIDE SERPL-SCNC: 112 MMOL/L (ref 98–112)
CO2 SERPL-SCNC: 21 MMOL/L (ref 21–32)
CREAT BLD-MCNC: 1.45 MG/DL
CREAT BLD-MCNC: 1.57 MG/DL
GLUCOSE BLD-MCNC: 135 MG/DL (ref 70–99)
OSMOLALITY SERPL CALC.SUM OF ELEC: 304 MOSM/KG (ref 275–295)
PATIENT FASTING Y/N/NP: YES
PHOSPHATE SERPL-MCNC: 3.6 MG/DL (ref 2.5–4.9)
POTASSIUM SERPL-SCNC: 5.3 MMOL/L (ref 3.5–5.1)
PTH-INTACT SERPL-MCNC: 76 PG/ML (ref 18.5–88)
SODIUM SERPL-SCNC: 142 MMOL/L (ref 136–145)
VIT D+METAB SERPL-MCNC: 14 NG/ML (ref 30–100)

## 2021-03-02 PROCEDURE — 82306 VITAMIN D 25 HYDROXY: CPT

## 2021-03-02 PROCEDURE — 82310 ASSAY OF CALCIUM: CPT

## 2021-03-02 PROCEDURE — 80048 BASIC METABOLIC PNL TOTAL CA: CPT

## 2021-03-02 PROCEDURE — 84100 ASSAY OF PHOSPHORUS: CPT

## 2021-03-02 PROCEDURE — 83970 ASSAY OF PARATHORMONE: CPT

## 2021-03-02 PROCEDURE — 82565 ASSAY OF CREATININE: CPT

## 2021-03-02 PROCEDURE — 36415 COLL VENOUS BLD VENIPUNCTURE: CPT

## 2021-03-03 DIAGNOSIS — E83.52 SERUM CALCIUM ELEVATED: Primary | ICD-10-CM

## 2021-03-21 DIAGNOSIS — I10 ESSENTIAL HYPERTENSION: ICD-10-CM

## 2021-03-21 DIAGNOSIS — E11.51 TYPE 2 DIABETES MELLITUS WITH ATHEROSCLEROSIS OF AORTA (HCC): ICD-10-CM

## 2021-03-21 DIAGNOSIS — I70.0 TYPE 2 DIABETES MELLITUS WITH ATHEROSCLEROSIS OF AORTA (HCC): ICD-10-CM

## 2021-03-22 RX ORDER — METOPROLOL SUCCINATE 25 MG/1
TABLET, EXTENDED RELEASE ORAL
Qty: 45 TABLET | Refills: 0 | Status: SHIPPED | OUTPATIENT
Start: 2021-03-22 | End: 2021-06-16

## 2021-03-22 RX ORDER — BLOOD SUGAR DIAGNOSTIC
STRIP MISCELLANEOUS
Qty: 100 STRIP | Refills: 0 | Status: SHIPPED | OUTPATIENT
Start: 2021-03-22 | End: 2021-06-16

## 2021-03-23 ENCOUNTER — TELEPHONE (OUTPATIENT)
Dept: INTERNAL MEDICINE CLINIC | Facility: CLINIC | Age: 86
End: 2021-03-23

## 2021-04-02 DIAGNOSIS — E11.51 TYPE 2 DIABETES MELLITUS WITH ATHEROSCLEROSIS OF AORTA (HCC): ICD-10-CM

## 2021-04-02 DIAGNOSIS — I70.0 TYPE 2 DIABETES MELLITUS WITH ATHEROSCLEROSIS OF AORTA (HCC): ICD-10-CM

## 2021-04-02 RX ORDER — METFORMIN HYDROCHLORIDE 500 MG/1
TABLET, EXTENDED RELEASE ORAL
Qty: 360 TABLET | Refills: 0 | Status: SHIPPED | OUTPATIENT
Start: 2021-04-02 | End: 2021-05-25

## 2021-04-28 ENCOUNTER — TELEPHONE (OUTPATIENT)
Dept: INTERNAL MEDICINE CLINIC | Facility: CLINIC | Age: 86
End: 2021-04-28

## 2021-04-28 DIAGNOSIS — M79.651 RIGHT THIGH PAIN: ICD-10-CM

## 2021-04-28 DIAGNOSIS — M54.41 ACUTE RIGHT-SIDED LOW BACK PAIN WITH RIGHT-SIDED SCIATICA: Primary | ICD-10-CM

## 2021-04-28 NOTE — TELEPHONE ENCOUNTER
Resting most of the day  When move it gets bad  Resting pain 7/10  With walking pain 10/10    \"Just don't want to go to ER\"  Offered to come for OV tomorrow  \"Can't find anybody to take me to appt\"    \"It is less than what it was so I will wait it out

## 2021-04-28 NOTE — TELEPHONE ENCOUNTER
Noted below. Due to inability to walk I agree with plan. She should make a FU apt in the office after she is dc home. I hope she feels better fast. Thanks.

## 2021-04-28 NOTE — TELEPHONE ENCOUNTER
Spoke to pt. C/o right lower back pain radiating down right leg. Pt states started 3 days ago. Denies any fall/injury. States saw Dr. Kylah Song for this before on 1/26/21. LS XR was done, PT, & Medrol dosepak. Pt c/o weakness in legs.   When asked about n

## 2021-04-29 RX ORDER — METHYLPREDNISOLONE 4 MG/1
TABLET ORAL
Qty: 1 KIT | Refills: 0 | Status: SHIPPED | OUTPATIENT
Start: 2021-04-29 | End: 2021-05-25 | Stop reason: ALTCHOICE

## 2021-04-29 NOTE — TELEPHONE ENCOUNTER
Rx sent. Called pt to let her know Medrol dosepak ordered.   Pt voiced understanding & will have neighbor or friend

## 2021-04-29 NOTE — TELEPHONE ENCOUNTER
Dr. Levon Villa  Please advise    Spoke to pt    Little better compare to yesterday  Walking little bit better compare to yesterday   Pain 8/10 when walk  Pain radiates from hip to knee only on the right leg    No pain when resting  Stated she was given Medr

## 2021-05-17 PROBLEM — N25.81 SECONDARY HYPERPARATHYROIDISM (HCC): Status: ACTIVE | Noted: 2021-05-17

## 2021-05-17 PROBLEM — E21.0 HYPERPARATHYROIDISM, PRIMARY (HCC): Status: ACTIVE | Noted: 2021-05-17

## 2021-05-20 ENCOUNTER — LAB ENCOUNTER (OUTPATIENT)
Dept: LAB | Age: 86
End: 2021-05-20
Attending: INTERNAL MEDICINE
Payer: MEDICARE

## 2021-05-20 ENCOUNTER — HOSPITAL ENCOUNTER (OUTPATIENT)
Dept: BONE DENSITY | Age: 86
Discharge: HOME | End: 2021-05-20
Attending: INTERNAL MEDICINE
Payer: MEDICARE

## 2021-05-20 DIAGNOSIS — E21.0 HYPERPARATHYROIDISM, PRIMARY (HCC): ICD-10-CM

## 2021-05-20 DIAGNOSIS — N25.81 SECONDARY HYPERPARATHYROIDISM (HCC): ICD-10-CM

## 2021-05-20 PROCEDURE — 82436 ASSAY OF URINE CHLORIDE: CPT

## 2021-05-20 PROCEDURE — 83945 ASSAY OF OXALATE: CPT

## 2021-05-20 PROCEDURE — 84392 ASSAY OF URINE SULFATE: CPT

## 2021-05-20 PROCEDURE — 77080 DXA BONE DENSITY AXIAL: CPT | Performed by: INTERNAL MEDICINE

## 2021-05-20 PROCEDURE — 82340 ASSAY OF CALCIUM IN URINE: CPT

## 2021-05-20 PROCEDURE — 82507 ASSAY OF CITRATE: CPT

## 2021-05-25 ENCOUNTER — OFFICE VISIT (OUTPATIENT)
Dept: INTERNAL MEDICINE CLINIC | Facility: CLINIC | Age: 86
End: 2021-05-25
Payer: MEDICARE

## 2021-05-25 VITALS
HEART RATE: 83 BPM | WEIGHT: 162.31 LBS | DIASTOLIC BLOOD PRESSURE: 48 MMHG | SYSTOLIC BLOOD PRESSURE: 120 MMHG | HEIGHT: 64 IN | RESPIRATION RATE: 14 BRPM | OXYGEN SATURATION: 92 % | BODY MASS INDEX: 27.71 KG/M2 | TEMPERATURE: 98 F

## 2021-05-25 DIAGNOSIS — E21.0 HYPERPARATHYROIDISM, PRIMARY (HCC): ICD-10-CM

## 2021-05-25 DIAGNOSIS — I70.0 TYPE 2 DIABETES MELLITUS WITH ATHEROSCLEROSIS OF AORTA (HCC): ICD-10-CM

## 2021-05-25 DIAGNOSIS — F33.0 MILD RECURRENT MAJOR DEPRESSION (HCC): ICD-10-CM

## 2021-05-25 DIAGNOSIS — E11.51 TYPE 2 DIABETES MELLITUS WITH ATHEROSCLEROSIS OF AORTA (HCC): ICD-10-CM

## 2021-05-25 DIAGNOSIS — Z00.00 ENCOUNTER FOR ANNUAL HEALTH EXAMINATION: Primary | ICD-10-CM

## 2021-05-25 DIAGNOSIS — Z86.73 HISTORY OF STROKE: ICD-10-CM

## 2021-05-25 DIAGNOSIS — M54.31 SCIATICA OF RIGHT SIDE: ICD-10-CM

## 2021-05-25 DIAGNOSIS — N18.4 CKD STAGE 4 DUE TO TYPE 2 DIABETES MELLITUS (HCC): ICD-10-CM

## 2021-05-25 DIAGNOSIS — I77.9 BILATERAL CAROTID ARTERY DISEASE, UNSPECIFIED TYPE (HCC): ICD-10-CM

## 2021-05-25 DIAGNOSIS — E11.22 CKD STAGE 4 DUE TO TYPE 2 DIABETES MELLITUS (HCC): ICD-10-CM

## 2021-05-25 DIAGNOSIS — I35.0 AORTIC STENOSIS, MILD: ICD-10-CM

## 2021-05-25 DIAGNOSIS — M47.816 ARTHRITIS OF FACET JOINT OF LUMBAR SPINE: ICD-10-CM

## 2021-05-25 DIAGNOSIS — N25.81 SECONDARY HYPERPARATHYROIDISM (HCC): ICD-10-CM

## 2021-05-25 DIAGNOSIS — I10 ESSENTIAL HYPERTENSION: ICD-10-CM

## 2021-05-25 DIAGNOSIS — E11.42 DIABETIC POLYNEUROPATHY ASSOCIATED WITH TYPE 2 DIABETES MELLITUS (HCC): ICD-10-CM

## 2021-05-25 PROBLEM — E83.52 HYPERCALCEMIA: Status: RESOLVED | Noted: 2021-01-26 | Resolved: 2021-05-25

## 2021-05-25 PROBLEM — I65.23 BILATERAL CAROTID ARTERY OCCLUSION: Status: RESOLVED | Noted: 2019-09-26 | Resolved: 2021-05-25

## 2021-05-25 PROBLEM — E79.0 HYPERURICEMIA: Status: RESOLVED | Noted: 2021-02-16 | Resolved: 2021-05-25

## 2021-05-25 PROBLEM — M76.30 IT BAND SYNDROME, UNSPECIFIED LATERALITY: Status: RESOLVED | Noted: 2021-02-16 | Resolved: 2021-05-25

## 2021-05-25 PROBLEM — M10.9 GOUT: Status: RESOLVED | Noted: 2019-01-29 | Resolved: 2021-05-25

## 2021-05-25 PROCEDURE — G0439 PPPS, SUBSEQ VISIT: HCPCS | Performed by: INTERNAL MEDICINE

## 2021-05-25 PROCEDURE — 99397 PER PM REEVAL EST PAT 65+ YR: CPT | Performed by: INTERNAL MEDICINE

## 2021-05-25 PROCEDURE — 96160 PT-FOCUSED HLTH RISK ASSMT: CPT | Performed by: INTERNAL MEDICINE

## 2021-05-25 RX ORDER — METFORMIN HYDROCHLORIDE 500 MG/1
1000 TABLET, EXTENDED RELEASE ORAL 2 TIMES DAILY WITH MEALS
Qty: 180 TABLET | Refills: 0 | COMMUNITY
Start: 2021-05-25 | End: 2021-06-02

## 2021-05-25 NOTE — PATIENT INSTRUCTIONS
Sarah Khanna's SCREENING SCHEDULE   Tests on this list are recommended by your physician but may not be covered, or covered at this frequency, by your insurer. Please check with your insurance carrier before scheduling to verify coverage.    PREVENTATIV years- more often if abnormal There are no preventive care reminders to display for this patient. Update Health Maintenance if applicable    Flex Sigmoidoscopy Screen  Covered every 5 years No results found for this or any previous visit.  No flowsheet data Pneumococcal 13 (Prevnar)  Covered Once after 65 No orders found for this or any previous visit. Please get once after your 65th birthday    Pneumococcal 23 (Pneumovax)  Covered Once after 65 No orders found for this or any previous visit.  Please get once

## 2021-05-25 NOTE — PROGRESS NOTES
HPI:   Brennon Mcintosh is a 80year old female who presents for a MA (Medicare Advantage) [facility] (Once per calendar year).     I also manage her diabetes and CKD 4 and HTN, gout  She is UTD w/DM f/u and well controlled    Recent endo consult for hyperp Jessica Ferrari MD (SURGERY, CARDIOVASCULAR)  Isabella Arcos (Maddy Smith)  Edison Ybarra MD (NEPHROLOGY)    Patient Active Problem List:     CKD stage 4 due to type 2 diabetes mellitus ([facility]     Type 2 diabetes mellitus with atherosclerosis of aorta ([facility] Oral Tab, Take 1 tablet (50 mg total) by mouth daily.   glimepiride 1 MG Oral Tab, Take 1 tablet (1 mg total) by mouth daily with breakfast.  BLUE NOGUEIRA 06F Does not apply Misc, TEST ONE TIME DAILY AND AS NEEDED  aspirin 325 MG Oral Tab, Take 3 good  No worrisome skin lesions  Gets urgency of bowels d/t metformin  Denies PU/PD, paresthesias, nonhealing skin wounds, visual change  Denies CP, , orthopnia or cough.  Has HANNA that is new since her difficulty walking d/t her sciatica, just throughout he growths including SKs   Lymph nodes: Cervical, supraclavicular, and axillary nodes normal   Neurologic: Normal       Vaccination History     Immunization History   Administered Date(s) Administered   • Covid-19 Vaccine Pfizer 30 mcg/0.3 ml 02/01/2021, 02/2 appetite been poor?: Yes  How does the patient maintain a good energy level?: Other (Walks every other day steps in building)  How would you describe your daily physical activity?: Moderate  How would you describe your current health state?: Fair  How do y if applicable    Chlamydia  Annually if high risk No results found for: CHLAMYDIA No flowsheet data found. Screening Mammogram      Mammogram Annually to 76, then as discussed There are no preventive care reminders to display for this patient.  Update He

## 2021-06-02 ENCOUNTER — TELEPHONE (OUTPATIENT)
Dept: NEUROLOGY | Facility: CLINIC | Age: 86
End: 2021-06-02

## 2021-06-02 ENCOUNTER — OFFICE VISIT (OUTPATIENT)
Dept: PAIN CLINIC | Facility: CLINIC | Age: 86
End: 2021-06-02
Payer: MEDICARE

## 2021-06-02 VITALS
SYSTOLIC BLOOD PRESSURE: 108 MMHG | OXYGEN SATURATION: 90 % | WEIGHT: 162 LBS | HEART RATE: 90 BPM | DIASTOLIC BLOOD PRESSURE: 62 MMHG | BODY MASS INDEX: 28 KG/M2

## 2021-06-02 DIAGNOSIS — M54.16 LUMBAR RADICULOPATHY: ICD-10-CM

## 2021-06-02 DIAGNOSIS — M51.36 DDD (DEGENERATIVE DISC DISEASE), LUMBAR: ICD-10-CM

## 2021-06-02 DIAGNOSIS — M54.16 LUMBAR RADICULOPATHY: Primary | ICD-10-CM

## 2021-06-02 DIAGNOSIS — M47.816 ARTHRITIS OF FACET JOINT OF LUMBAR SPINE: Primary | ICD-10-CM

## 2021-06-02 PROBLEM — M51.369 DDD (DEGENERATIVE DISC DISEASE), LUMBAR: Status: ACTIVE | Noted: 2021-06-02

## 2021-06-02 PROCEDURE — 3078F DIAST BP <80 MM HG: CPT | Performed by: ANESTHESIOLOGY

## 2021-06-02 PROCEDURE — 3074F SYST BP LT 130 MM HG: CPT | Performed by: ANESTHESIOLOGY

## 2021-06-02 PROCEDURE — 99204 OFFICE O/P NEW MOD 45 MIN: CPT | Performed by: ANESTHESIOLOGY

## 2021-06-02 NOTE — PROGRESS NOTES
HPI/Subjective:   Patient ID: Avi Estrella is a 80year old female.     HPI    History/Other:   Review of Systems  Current Outpatient Medications   Medication Sig Dispense Refill   • metFORMIN HCl  MG Oral Tablet 24 Hr Take 2 tablets (1,000 mg total Origin of Pain:    Degenerative    Aggravating Factors:    Sitting, Standing and Walking    Past Treatments for Current Pain Condition:   Other none    Prior diagnostic testing for your pain:  xray

## 2021-06-02 NOTE — TELEPHONE ENCOUNTER
right L4-5, L5-S1 TFESI CPT Code 63489, 64484-APPROVED    [employer] online to initiate authorization  Prior authorization request completed for: right L4-5, L5-S1 TFESI   Authorization # [id number]  Authorization dates: 6/02/21 thru 11/29/21  CPT codes approv

## 2021-06-02 NOTE — H&P
Name: Tristin Sanchez   : 1935   DOS: 2021     Chief complaint: Lumbar radiculopathy    History of present illness:  Tristin Sanchez is a 80year old female with a history of carotid stenosis, and mild aortic stenosis, referred here for evaluation 2010   • Pneumonia, organism unspecified(486)    • Stroke [facility]) 1998   • Type II or unspecified type diabetes mellitus without mention of complication, not stated as uncontrolled    • Unspecified essential hypertension    • Visual impairment       Current since quittin.0      Smokeless tobacco: Never Used    Vaping Use      Vaping Use: Never used    Alcohol use: No    Drug use: No      Review of  other systems:  10 point ROS otherwise negative    Physical examination: James Zayas is a 80year old female not However, x-ray does show anterior listhesis of L4 and L5 with significant degeneration of the L5 disc level. Likely corresponds to lumbar radiculopathy. I did order lumbar MRI.   Additionally, discussed moving forward with transforaminal epidural steroid

## 2021-06-03 NOTE — TELEPHONE ENCOUNTER
Question Answer Comment   Anesthesia Type Local    Provider [facility] Lab    Procedure Transforaminal    Laterality/Level right L4-5, L5-S1    Medical clearance requested (will send to Pain Navigator) No    Patient has Medicare coverage?  No    Comme

## 2021-06-08 ENCOUNTER — APPOINTMENT (OUTPATIENT)
Dept: GENERAL RADIOLOGY | Facility: HOSPITAL | Age: 86
End: 2021-06-08
Attending: ANESTHESIOLOGY
Payer: MEDICARE

## 2021-06-08 ENCOUNTER — HOSPITAL ENCOUNTER (OUTPATIENT)
Facility: HOSPITAL | Age: 86
Setting detail: HOSPITAL OUTPATIENT SURGERY
Discharge: HOME | End: 2021-06-08
Attending: ANESTHESIOLOGY | Admitting: ANESTHESIOLOGY
Payer: MEDICARE

## 2021-06-08 VITALS
TEMPERATURE: 98 F | RESPIRATION RATE: 18 BRPM | SYSTOLIC BLOOD PRESSURE: 164 MMHG | OXYGEN SATURATION: 97 % | DIASTOLIC BLOOD PRESSURE: 56 MMHG | HEART RATE: 85 BPM

## 2021-06-08 DIAGNOSIS — M54.16 LUMBAR RADICULOPATHY: ICD-10-CM

## 2021-06-08 PROCEDURE — 3E0R33Z INTRODUCTION OF ANTI-INFLAMMATORY INTO SPINAL CANAL, PERCUTANEOUS APPROACH: ICD-10-PCS | Performed by: ANESTHESIOLOGY

## 2021-06-08 PROCEDURE — 82962 GLUCOSE BLOOD TEST: CPT

## 2021-06-08 RX ORDER — DEXAMETHASONE SODIUM PHOSPHATE 10 MG/ML
INJECTION, SOLUTION INTRAMUSCULAR; INTRAVENOUS
Status: DISCONTINUED | OUTPATIENT
Start: 2021-06-08 | End: 2021-06-08

## 2021-06-08 RX ORDER — DIPHENHYDRAMINE HYDROCHLORIDE 50 MG/ML
50 INJECTION INTRAMUSCULAR; INTRAVENOUS ONCE AS NEEDED
Status: DISCONTINUED | OUTPATIENT
Start: 2021-06-08 | End: 2021-06-08

## 2021-06-08 RX ORDER — ONDANSETRON 2 MG/ML
4 INJECTION INTRAMUSCULAR; INTRAVENOUS ONCE AS NEEDED
Status: DISCONTINUED | OUTPATIENT
Start: 2021-06-08 | End: 2021-06-08

## 2021-06-08 RX ORDER — SODIUM CHLORIDE 9 MG/ML
INJECTION INTRAVENOUS
Status: DISCONTINUED | OUTPATIENT
Start: 2021-06-08 | End: 2021-06-08

## 2021-06-08 RX ORDER — DEXTROSE MONOHYDRATE 25 G/50ML
50 INJECTION, SOLUTION INTRAVENOUS
Status: DISCONTINUED | OUTPATIENT
Start: 2021-06-08 | End: 2021-06-08

## 2021-06-08 RX ORDER — LIDOCAINE HYDROCHLORIDE 10 MG/ML
INJECTION, SOLUTION EPIDURAL; INFILTRATION; INTRACAUDAL; PERINEURAL
Status: DISCONTINUED | OUTPATIENT
Start: 2021-06-08 | End: 2021-06-08

## 2021-06-08 RX ORDER — INSULIN ASPART 100 [IU]/ML
3 INJECTION, SOLUTION INTRAVENOUS; SUBCUTANEOUS ONCE
Status: DISCONTINUED | OUTPATIENT
Start: 2021-06-08 | End: 2021-06-08

## 2021-06-08 NOTE — OPERATIVE REPORT
[facility]  Operative Report  2021     Indu Keen Patient Status:  Hospital Outpatient Surgery    1935 MRN [id number]   Location [id number] [place] [address] Attending Vielka Wilde MD   [facility] Day # 0 PCP [facility] epidural space and the nerve root was obtained. At this point, 2 cc of normal saline with 10 mg of dexamethasone was injected without complication. The needle was withdrawn with stylet in situ. The patient tolerated procedure very well.   The patient was

## 2021-06-08 NOTE — H&P
History & Physical Examination    Patient Name: Alejandro King  MRN: [id number]  CSN: [id number]  YOB: 1935    Pre-Operative Diagnosis:  Lumbar radiculopathy [M54.16]    Present Illness: Patient with lumbar radiculopathy for transforaminal epid [facility] ([facility] 5/25/2021   • Bilateral carotid artery disease ([facility] 5/25/2021 2021 vascular:   If her stenosis goes over 70% and she is asymptomatic, it may be an indication for surgery; however, due to her diabetes, age, and renal insufficiency, [facility]         lung   • Cancer Sister         lung     Social History    Tobacco Use      Smoking status: Former Smoker        Quit date: 1998        Years since quittin.1      Smokeless tobacco: Never Used    Alcohol use: No      SYSTEM C

## 2021-06-08 NOTE — OR NURSING
Patient has 2 bandaids to the lower back, CDI. Up at side of bed with stand by assist.  Tolerated well.   Able to walk in room without assist.

## 2021-06-09 ENCOUNTER — HOSPITAL ENCOUNTER (OUTPATIENT)
Dept: MRI IMAGING | Facility: HOSPITAL | Age: 86
Discharge: HOME | End: 2021-06-09
Attending: ANESTHESIOLOGY
Payer: MEDICARE

## 2021-06-09 DIAGNOSIS — M51.36 DDD (DEGENERATIVE DISC DISEASE), LUMBAR: ICD-10-CM

## 2021-06-09 DIAGNOSIS — M47.816 ARTHRITIS OF FACET JOINT OF LUMBAR SPINE: ICD-10-CM

## 2021-06-09 DIAGNOSIS — M54.16 LUMBAR RADICULOPATHY: ICD-10-CM

## 2021-06-09 PROCEDURE — 72148 MRI LUMBAR SPINE W/O DYE: CPT | Performed by: ANESTHESIOLOGY

## 2021-06-14 DIAGNOSIS — E11.51 TYPE 2 DIABETES MELLITUS WITH ATHEROSCLEROSIS OF AORTA (HCC): ICD-10-CM

## 2021-06-14 DIAGNOSIS — I70.0 TYPE 2 DIABETES MELLITUS WITH ATHEROSCLEROSIS OF AORTA (HCC): ICD-10-CM

## 2021-06-14 DIAGNOSIS — I10 ESSENTIAL HYPERTENSION: ICD-10-CM

## 2021-06-16 RX ORDER — METOPROLOL SUCCINATE 25 MG/1
TABLET, EXTENDED RELEASE ORAL
Qty: 45 TABLET | Refills: 0 | Status: SHIPPED | OUTPATIENT
Start: 2021-06-16 | End: 2021-09-09

## 2021-06-16 RX ORDER — GLIMEPIRIDE 1 MG/1
1 TABLET ORAL
Qty: 90 TABLET | Refills: 0 | Status: SHIPPED | OUTPATIENT
Start: 2021-06-16 | End: 2021-09-09

## 2021-06-16 RX ORDER — BLOOD SUGAR DIAGNOSTIC
STRIP MISCELLANEOUS
Qty: 100 STRIP | Refills: 0 | Status: SHIPPED | OUTPATIENT
Start: 2021-06-16 | End: 2021-09-09

## 2021-06-17 ENCOUNTER — TELEPHONE (OUTPATIENT)
Dept: PAIN CLINIC | Facility: CLINIC | Age: 86
End: 2021-06-17

## 2021-06-17 DIAGNOSIS — M54.16 LUMBAR RADICULOPATHY: Primary | ICD-10-CM

## 2021-06-17 NOTE — TELEPHONE ENCOUNTER
Larry Haider MD  You 26 minutes ago (9:40 AM)     Still from lumbar radiculopathy based on her new MRI. It is at the right L4 level.  May repeat tlesi

## 2021-06-17 NOTE — TELEPHONE ENCOUNTER
Pt is s/p right TLESI on 6/8. Pt states lower back pain improved, but has severe pain from right hip down to ankle. Confirmed that pt did experience pain relief in her lower back.  Advised pt that recommendations will be requested and call back will be prov

## 2021-06-17 NOTE — TELEPHONE ENCOUNTER
Contacted pt to relay info below. Pt in agreement with repeating injection. Advised pt that PA will be required, will process and call with response.      Collected following info in the event that it is needed for PA:  Pt states she obtained about 25% impr

## 2021-06-18 ENCOUNTER — TELEPHONE (OUTPATIENT)
Dept: NEUROLOGY | Facility: CLINIC | Age: 86
End: 2021-06-18

## 2021-06-18 DIAGNOSIS — M54.16 LUMBAR RADICULOPATHY: Primary | ICD-10-CM

## 2021-06-18 NOTE — TELEPHONE ENCOUNTER
Anesthesia Type Local    Provider Arno Pencil    Location Lab    Procedure Transforaminal    Laterality/Level right L4    Medical clearance requested (will send to Pain Navigator) No    Patient has Medicare coverage?  No    Comments (Please list entire procedure n

## 2021-06-18 NOTE — TELEPHONE ENCOUNTER
Prior authorization request completed for: Right L4 TLESI   Authorization #[id number]     Authorization dates: Authorization dates: 06/22/21 - 12/19/21  Spoke with Claudia Lord at [address] [place] [address]  Confirmed DOS Valid Dates     CPT codes approved: 49942  Nu

## 2021-06-18 NOTE — TELEPHONE ENCOUNTER
[address]  PRE-PROCEDURE INSTRUCTIONS WITHOUT SEDATION           Procedure- Right L4 TLESI   Appointment Date: 6/22/2021  Check-In Time: 15 Noon     Per Dr. Rosemary Hatch, pt does not need to hold ASA 325mg prior to injection      ** TO AVOID [facility] days  • Xarelto (Rivaroxaban) 3 days  • Lovenox (Enoxaparin) 24 hours  • Aspirin  • 81mg               24 hours  • Greater than 81mg but less than 325mg   5 days  • 325mg and greater                  7 days  • Coumadin       5 days  • Procedure may be canc injection site for up to 3-5 days after procedure, you can use heat or ice (20 minutes on 20 minutes off) for comfort.       ** TO AVOID CANCELLATION AND/OR RESCHEDULING: PLEASE CALL [facility] PRE-PROCEDURE LINE [phone] FOR DETAILED INSTRUCTIONS FIVE TO S

## 2021-06-22 ENCOUNTER — HOSPITAL ENCOUNTER (OUTPATIENT)
Facility: HOSPITAL | Age: 86
Setting detail: HOSPITAL OUTPATIENT SURGERY
Discharge: HOME | End: 2021-06-22
Attending: ANESTHESIOLOGY | Admitting: ANESTHESIOLOGY
Payer: MEDICARE

## 2021-06-22 ENCOUNTER — APPOINTMENT (OUTPATIENT)
Dept: GENERAL RADIOLOGY | Facility: HOSPITAL | Age: 86
End: 2021-06-22
Attending: ANESTHESIOLOGY
Payer: MEDICARE

## 2021-06-22 VITALS
TEMPERATURE: 97 F | HEART RATE: 74 BPM | OXYGEN SATURATION: 91 % | DIASTOLIC BLOOD PRESSURE: 52 MMHG | RESPIRATION RATE: 17 BRPM | SYSTOLIC BLOOD PRESSURE: 128 MMHG

## 2021-06-22 DIAGNOSIS — M54.16 LUMBAR RADICULOPATHY: ICD-10-CM

## 2021-06-22 PROCEDURE — 82962 GLUCOSE BLOOD TEST: CPT

## 2021-06-22 PROCEDURE — 3E0R33Z INTRODUCTION OF ANTI-INFLAMMATORY INTO SPINAL CANAL, PERCUTANEOUS APPROACH: ICD-10-PCS | Performed by: ANESTHESIOLOGY

## 2021-06-22 RX ORDER — DEXTROSE MONOHYDRATE 25 G/50ML
50 INJECTION, SOLUTION INTRAVENOUS
Status: DISCONTINUED | OUTPATIENT
Start: 2021-06-22 | End: 2021-06-22

## 2021-06-22 RX ORDER — LIDOCAINE HYDROCHLORIDE 10 MG/ML
INJECTION, SOLUTION EPIDURAL; INFILTRATION; INTRACAUDAL; PERINEURAL
Status: DISCONTINUED | OUTPATIENT
Start: 2021-06-22 | End: 2021-06-22

## 2021-06-22 RX ORDER — SODIUM CHLORIDE 9 MG/ML
INJECTION INTRAVENOUS
Status: DISCONTINUED | OUTPATIENT
Start: 2021-06-22 | End: 2021-06-22

## 2021-06-22 RX ORDER — DEXAMETHASONE SODIUM PHOSPHATE 10 MG/ML
INJECTION, SOLUTION INTRAMUSCULAR; INTRAVENOUS
Status: DISCONTINUED | OUTPATIENT
Start: 2021-06-22 | End: 2021-06-22

## 2021-06-22 RX ORDER — INSULIN ASPART 100 [IU]/ML
3 INJECTION, SOLUTION INTRAVENOUS; SUBCUTANEOUS ONCE
Status: DISCONTINUED | OUTPATIENT
Start: 2021-06-22 | End: 2021-06-22

## 2021-06-22 RX ORDER — DIPHENHYDRAMINE HYDROCHLORIDE 50 MG/ML
50 INJECTION INTRAMUSCULAR; INTRAVENOUS ONCE AS NEEDED
Status: DISCONTINUED | OUTPATIENT
Start: 2021-06-22 | End: 2021-06-22

## 2021-06-22 RX ORDER — ONDANSETRON 2 MG/ML
4 INJECTION INTRAMUSCULAR; INTRAVENOUS ONCE AS NEEDED
Status: DISCONTINUED | OUTPATIENT
Start: 2021-06-22 | End: 2021-06-22

## 2021-06-22 NOTE — OPERATIVE REPORT
[facility]  Operative Report  2021     Goyo Khanna Patient Status:  Hospital Outpatient Surgery    1935 MRN [id number]   Location [address] [place] [address] Attending Berenice Ramos MD   [facility] Day # 0 PCP Darin Vallejo point, 2 cc of normal saline with 10 mg of dexamethasone was injected without complication. The needle was withdrawn with stylet in situ. The patient tolerated procedure very well. The patient was observed until discharge criteria met.   Discharge instruc

## 2021-06-22 NOTE — H&P
History & Physical Examination    Patient Name: Arthur Myers  MRN: [id number]  CSN: [id number]  YOB: 1935    Pre-Operative Diagnosis:  Lumbar radiculopathy [M54.16]    Present Illness: Patient with lumbar radiculopathy for transforaminal e [facility] 5/25/2021   • Bilateral carotid artery disease [facility] 5/25/2021 2021 vascular:   If her stenosis goes over 70% and she is asymptomatic, it may be an indication for surgery; however, due to her diabetes, age, and renal insufficiency, mo • Cancer Brother         lung   • Cancer Sister         lung     Social History    Tobacco Use      Smoking status: Former Smoker        Quit date: 1998        Years since quittin.1      Smokeless tobacco: Never Used    Alcohol use: No      SYS

## 2021-06-27 DIAGNOSIS — E11.51 TYPE 2 DIABETES MELLITUS WITH ATHEROSCLEROSIS OF AORTA (HCC): ICD-10-CM

## 2021-06-27 DIAGNOSIS — I70.0 TYPE 2 DIABETES MELLITUS WITH ATHEROSCLEROSIS OF AORTA (HCC): ICD-10-CM

## 2021-06-28 ENCOUNTER — TELEPHONE (OUTPATIENT)
Dept: PAIN CLINIC | Facility: CLINIC | Age: 86
End: 2021-06-28

## 2021-06-28 ENCOUNTER — LAB ENCOUNTER (OUTPATIENT)
Dept: LAB | Age: 86
End: 2021-06-28
Attending: INTERNAL MEDICINE
Payer: MEDICARE

## 2021-06-28 DIAGNOSIS — E21.0 HYPERPARATHYROIDISM, PRIMARY (HCC): ICD-10-CM

## 2021-06-28 PROCEDURE — 80053 COMPREHEN METABOLIC PANEL: CPT

## 2021-06-28 NOTE — TELEPHONE ENCOUNTER
Spoke to pt who states she obtained very little relief following injection on 6/22, which was a repeat righ TLESI. Pt states she is still experiencing pain from right hip down leg. Pt states she has taken only Tylenol and prefers not to take any narcotics.

## 2021-06-28 NOTE — TELEPHONE ENCOUNTER
Joyce Lujan MD  You 5 minutes ago (9:59 AM)     Would recommend OV vs neurosurgery consult      Contacted pt to offer an OV. Pt agreeable to scheduling for Wed 6/30. No further needs.

## 2021-06-29 RX ORDER — METFORMIN HYDROCHLORIDE 500 MG/1
1000 TABLET, EXTENDED RELEASE ORAL 2 TIMES DAILY WITH MEALS
Qty: 360 TABLET | Refills: 0 | Status: SHIPPED | OUTPATIENT
Start: 2021-06-29 | End: 2021-08-03

## 2021-06-30 ENCOUNTER — OFFICE VISIT (OUTPATIENT)
Dept: PAIN CLINIC | Facility: CLINIC | Age: 86
End: 2021-06-30
Payer: MEDICARE

## 2021-06-30 VITALS
WEIGHT: 162 LBS | HEART RATE: 74 BPM | SYSTOLIC BLOOD PRESSURE: 124 MMHG | DIASTOLIC BLOOD PRESSURE: 60 MMHG | BODY MASS INDEX: 28 KG/M2 | OXYGEN SATURATION: 98 %

## 2021-06-30 DIAGNOSIS — M51.36 DDD (DEGENERATIVE DISC DISEASE), LUMBAR: ICD-10-CM

## 2021-06-30 DIAGNOSIS — M47.816 ARTHRITIS OF FACET JOINT OF LUMBAR SPINE: Primary | ICD-10-CM

## 2021-06-30 DIAGNOSIS — M54.16 LUMBAR RADICULOPATHY: ICD-10-CM

## 2021-06-30 PROCEDURE — 3074F SYST BP LT 130 MM HG: CPT | Performed by: ANESTHESIOLOGY

## 2021-06-30 PROCEDURE — 3078F DIAST BP <80 MM HG: CPT | Performed by: ANESTHESIOLOGY

## 2021-06-30 PROCEDURE — 99214 OFFICE O/P EST MOD 30 MIN: CPT | Performed by: ANESTHESIOLOGY

## 2021-06-30 NOTE — PROGRESS NOTES
Patient presents in office today with reported pain in right hip and down the leg    Current pain level reported = 10/10    Last reported dose of tylenol this morning      Narcotic Contract renewal na    Urine Drug screen na

## 2021-06-30 NOTE — PROGRESS NOTES
Name: Hugo Hagan   : 1935   DOS: 2021     Pain Clinic Follow Up Visit:   Patient presents with:   Follow - Up      Hugo Hagan is a 80year old female with a history of lumbar degenerative disc disease and neuroforaminal stenosis fo year old female in no acute distress. Neurologic[de-identified] WNL-Orientation to time, place and person, normal mood & affect, concentration & attention span intact. Inspection:  Ambulates with well-coordinated, fluid, non-antalgic gait. Gait is normal.  Neck:  Fu

## 2021-07-12 ENCOUNTER — OFFICE VISIT (OUTPATIENT)
Dept: SURGERY | Facility: CLINIC | Age: 86
End: 2021-07-12
Payer: MEDICARE

## 2021-07-12 VITALS — DIASTOLIC BLOOD PRESSURE: 62 MMHG | SYSTOLIC BLOOD PRESSURE: 120 MMHG | BODY MASS INDEX: 28 KG/M2 | WEIGHT: 162 LBS

## 2021-07-12 DIAGNOSIS — E11.22 CKD STAGE 4 DUE TO TYPE 2 DIABETES MELLITUS (HCC): ICD-10-CM

## 2021-07-12 DIAGNOSIS — N18.4 CKD STAGE 4 DUE TO TYPE 2 DIABETES MELLITUS (HCC): ICD-10-CM

## 2021-07-12 DIAGNOSIS — M54.16 LUMBAR RADICULOPATHY: Primary | ICD-10-CM

## 2021-07-12 DIAGNOSIS — Z86.73 HISTORY OF STROKE: ICD-10-CM

## 2021-07-12 PROCEDURE — 3078F DIAST BP <80 MM HG: CPT | Performed by: PHYSICIAN ASSISTANT

## 2021-07-12 PROCEDURE — 99203 OFFICE O/P NEW LOW 30 MIN: CPT | Performed by: PHYSICIAN ASSISTANT

## 2021-07-12 PROCEDURE — 3074F SYST BP LT 130 MM HG: CPT | Performed by: PHYSICIAN ASSISTANT

## 2021-07-12 RX ORDER — GABAPENTIN 100 MG/1
CAPSULE ORAL
Qty: 60 CAPSULE | Refills: 0 | Status: SHIPPED | OUTPATIENT
Start: 2021-07-12 | End: 2021-07-28

## 2021-07-12 NOTE — PROGRESS NOTES
No injections, has been doing some PT exercises at home  Isn't helping too much  Pain is almost all the time, hard to find a position to be comfortable  Pain is going down the right leg

## 2021-07-12 NOTE — H&P
Neurosurgery Clinic Visit  2021    Tenisha Lang PCP:  Reshma Shane MD    1935 MRN [id number]       CC:  Right Leg Pain    HPI:    Lindsay Rodgers is a very pleasant 80year old female with PMH of CVA 20 years ago, CKD who presents with 4 m 2021 vascular:   If her stenosis goes over 70% and she is asymptomatic, it may be an indication for surgery; however, due to her diabetes, age, and renal insufficiency, most vascular surgeons would continue to observe.   I probably might not do any inter Exam:   07/12/21  1404   BP: 120/62     Body mass index is 27.81 kg/m².   General: No Apparent Distress, Well Nourished, Well Developed, Normal Voice, Mood Appropriate, Appears Stated Age  HEENT: No Scleral Icterus, Good Dentition, No Facial Asymmetry  Inte

## 2021-07-14 ENCOUNTER — TELEPHONE (OUTPATIENT)
Dept: INTERNAL MEDICINE CLINIC | Facility: CLINIC | Age: 86
End: 2021-07-14

## 2021-07-14 NOTE — TELEPHONE ENCOUNTER
Dr Duyen Truong and Dr Phebe Brittle have both ordered blood tests for patient. She does not want to duplicate the tests. Please advise. Thank you!

## 2021-07-15 RX ORDER — ATORVASTATIN CALCIUM 20 MG/1
TABLET, FILM COATED ORAL
Qty: 90 TABLET | Refills: 1 | Status: ON HOLD | OUTPATIENT
Start: 2021-07-15 | End: 2021-10-20

## 2021-07-28 ENCOUNTER — TELEPHONE (OUTPATIENT)
Dept: SURGERY | Facility: CLINIC | Age: 86
End: 2021-07-28

## 2021-07-28 RX ORDER — GABAPENTIN 100 MG/1
CAPSULE ORAL
Qty: 120 CAPSULE | Refills: 0 | Status: SHIPPED | OUTPATIENT
Start: 2021-07-28 | End: 2021-09-09

## 2021-07-28 NOTE — TELEPHONE ENCOUNTER
If she is tolerating gabapentin TID she can increase medication to 2 tabs po TID to try to get more relief. I can send a new Rx.

## 2021-07-28 NOTE — TELEPHONE ENCOUNTER
[facility] how her medication is doing for her. Patient states is has improved but not completely. Please contact to further discuss.

## 2021-07-29 ENCOUNTER — LAB ENCOUNTER (OUTPATIENT)
Dept: LAB | Age: 86
End: 2021-07-29
Attending: INTERNAL MEDICINE
Payer: MEDICARE

## 2021-07-29 DIAGNOSIS — I70.0 TYPE 2 DIABETES MELLITUS WITH ATHEROSCLEROSIS OF AORTA (HCC): ICD-10-CM

## 2021-07-29 DIAGNOSIS — E11.51 TYPE 2 DIABETES MELLITUS WITH ATHEROSCLEROSIS OF AORTA (HCC): ICD-10-CM

## 2021-07-29 LAB
ALBUMIN SERPL-MCNC: 3.7 G/DL (ref 3.4–5)
ALBUMIN/GLOB SERPL: 1 {RATIO} (ref 1–2)
ALP LIVER SERPL-CCNC: 69 U/L
ALT SERPL-CCNC: 20 U/L
ANION GAP SERPL CALC-SCNC: 5 MMOL/L (ref 0–18)
AST SERPL-CCNC: 15 U/L (ref 15–37)
BILIRUB SERPL-MCNC: 0.6 MG/DL (ref 0.1–2)
BUN BLD-MCNC: 28 MG/DL (ref 7–18)
CALCIUM BLD-MCNC: 9.8 MG/DL (ref 8.5–10.1)
CHLORIDE SERPL-SCNC: 114 MMOL/L (ref 98–112)
CHOLEST SMN-MCNC: 107 MG/DL (ref ?–200)
CO2 SERPL-SCNC: 21 MMOL/L (ref 21–32)
CREAT BLD-MCNC: 1.45 MG/DL
EST. AVERAGE GLUCOSE BLD GHB EST-MCNC: 146 MG/DL (ref 68–126)
GLOBULIN PLAS-MCNC: 3.6 G/DL (ref 2.8–4.4)
GLUCOSE BLD-MCNC: 124 MG/DL (ref 70–99)
HBA1C MFR BLD HPLC: 6.7 % (ref ?–5.7)
HDLC SERPL-MCNC: 49 MG/DL (ref 40–59)
LDLC SERPL CALC-MCNC: 36 MG/DL (ref ?–100)
M PROTEIN MFR SERPL ELPH: 7.3 G/DL (ref 6.4–8.2)
NONHDLC SERPL-MCNC: 58 MG/DL (ref ?–130)
OSMOLALITY SERPL CALC.SUM OF ELEC: 297 MOSM/KG (ref 275–295)
PATIENT FASTING Y/N/NP: YES
PATIENT FASTING Y/N/NP: YES
POTASSIUM SERPL-SCNC: 5.1 MMOL/L (ref 3.5–5.1)
SODIUM SERPL-SCNC: 140 MMOL/L (ref 136–145)
TRIGL SERPL-MCNC: 124 MG/DL (ref 30–149)
VLDLC SERPL CALC-MCNC: 17 MG/DL (ref 0–30)

## 2021-07-29 PROCEDURE — 36415 COLL VENOUS BLD VENIPUNCTURE: CPT

## 2021-07-29 PROCEDURE — 83036 HEMOGLOBIN GLYCOSYLATED A1C: CPT

## 2021-07-29 PROCEDURE — 80061 LIPID PANEL: CPT

## 2021-07-29 PROCEDURE — 80053 COMPREHEN METABOLIC PANEL: CPT

## 2021-07-29 NOTE — TELEPHONE ENCOUNTER
Called pt to inform of Anna Rios, [address] below note.  No answer, [facility] [place] [facility]     Will try to call patient again at later time

## 2021-07-29 NOTE — TELEPHONE ENCOUNTER
Spoke with patient and informed her that a new prescription will be sent to her pharmacy with the intended amount of tablets patient is to now take TID. Provided information from Kristie Meadows. Patient stated that she understood the information and she will contact her pharmacy regarding the new med order and thanked me for the discussion.

## 2021-07-30 RX ORDER — GABAPENTIN 100 MG/1
CAPSULE ORAL
Qty: 60 CAPSULE | Refills: 0 | OUTPATIENT
Start: 2021-07-30

## 2021-07-30 NOTE — TELEPHONE ENCOUNTER
Duplicate request, this was filled on 7/28/21     E-Prescribing Status: Receipt confirmed by pharmacy (7/28/2021  3:15 PM CDT) Protopic Pregnancy And Lactation Text: This medication is Pregnancy Category C. It is unknown if this medication is excreted in breast milk when applied topically.

## 2021-08-03 ENCOUNTER — OFFICE VISIT (OUTPATIENT)
Dept: INTERNAL MEDICINE CLINIC | Facility: CLINIC | Age: 86
End: 2021-08-03
Payer: MEDICARE

## 2021-08-03 VITALS
HEART RATE: 79 BPM | SYSTOLIC BLOOD PRESSURE: 122 MMHG | DIASTOLIC BLOOD PRESSURE: 58 MMHG | WEIGHT: 165.13 LBS | TEMPERATURE: 98 F | BODY MASS INDEX: 28.19 KG/M2 | HEIGHT: 64 IN | RESPIRATION RATE: 16 BRPM | OXYGEN SATURATION: 99 %

## 2021-08-03 DIAGNOSIS — I70.0 TYPE 2 DIABETES MELLITUS WITH ATHEROSCLEROSIS OF AORTA (HCC): Primary | ICD-10-CM

## 2021-08-03 DIAGNOSIS — F33.0 MILD RECURRENT MAJOR DEPRESSION (HCC): ICD-10-CM

## 2021-08-03 DIAGNOSIS — E11.51 TYPE 2 DIABETES MELLITUS WITH ATHEROSCLEROSIS OF AORTA (HCC): Primary | ICD-10-CM

## 2021-08-03 DIAGNOSIS — I10 ESSENTIAL HYPERTENSION: ICD-10-CM

## 2021-08-03 DIAGNOSIS — E11.42 DIABETIC POLYNEUROPATHY ASSOCIATED WITH TYPE 2 DIABETES MELLITUS (HCC): ICD-10-CM

## 2021-08-03 PROBLEM — N18.4 CKD STAGE 4 DUE TO TYPE 2 DIABETES MELLITUS (HCC): Chronic | Status: RESOLVED | Noted: 2019-01-29 | Resolved: 2021-08-03

## 2021-08-03 PROBLEM — N18.32 STAGE 3B CHRONIC KIDNEY DISEASE (HCC): Status: ACTIVE | Noted: 2020-07-21

## 2021-08-03 PROBLEM — N18.32 STAGE 3B CHRONIC KIDNEY DISEASE (HCC): Status: RESOLVED | Noted: 2020-07-21 | Resolved: 2021-08-03

## 2021-08-03 PROBLEM — E11.22 CKD STAGE 4 DUE TO TYPE 2 DIABETES MELLITUS (HCC): Chronic | Status: RESOLVED | Noted: 2019-01-29 | Resolved: 2021-08-03

## 2021-08-03 PROCEDURE — 99214 OFFICE O/P EST MOD 30 MIN: CPT | Performed by: INTERNAL MEDICINE

## 2021-08-03 PROCEDURE — 3078F DIAST BP <80 MM HG: CPT | Performed by: INTERNAL MEDICINE

## 2021-08-03 PROCEDURE — 3008F BODY MASS INDEX DOCD: CPT | Performed by: INTERNAL MEDICINE

## 2021-08-03 PROCEDURE — 3074F SYST BP LT 130 MM HG: CPT | Performed by: INTERNAL MEDICINE

## 2021-08-03 NOTE — PROGRESS NOTES
HPI:   Davina Hurtado is a 80year old female who presents for recheck of her diabetes. DM: denies visual disturbance,she has stable paresthesias, denies nonhealing sores, PU/PD. Fasting blood sugars run around 130s .  Has diarrhea from the metformin Oral Tab Take 1 tablet (50 mg total) by mouth daily. 90 tablet 3   • ONETOUCH DELICA LANCETS 58Y Does not apply Misc TEST ONE TIME DAILY AND AS NEEDED 1000 each 3   • aspirin 325 MG Oral Tab Take 325 mg by mouth daily.                Patient Active Problem mmol/L    Potassium 5.1 3.5 - 5.1 mmol/L    Chloride 114 (H) 98 - 112 mmol/L    CO2 21.0 21.0 - 32.0 mmol/L    Anion Gap 5 0 - 18 mmol/L    BUN 28 (H) 7 - 18 mg/dL    Creatinine 1.45 (H) 0.55 - 1.02 mg/dL    Calcium, Total 9.8 8.5 - 10.1 mg/dL    Calculate this visit. No follow-ups on file.

## 2021-08-08 DIAGNOSIS — E11.9 TYPE 2 DIABETES MELLITUS WITHOUT COMPLICATION, WITHOUT LONG-TERM CURRENT USE OF INSULIN (HCC): ICD-10-CM

## 2021-08-09 DIAGNOSIS — M10.9 GOUT, UNSPECIFIED CAUSE, UNSPECIFIED CHRONICITY, UNSPECIFIED SITE: Primary | ICD-10-CM

## 2021-08-10 RX ORDER — LANCETS 33 GAUGE
EACH MISCELLANEOUS
Qty: 100 EACH | Refills: 3 | Status: SHIPPED | OUTPATIENT
Start: 2021-08-10 | End: 2021-10-04

## 2021-08-10 RX ORDER — ALLOPURINOL 100 MG/1
TABLET ORAL
Qty: 180 TABLET | Refills: 1 | Status: ON HOLD | OUTPATIENT
Start: 2021-08-10 | End: 2021-10-20

## 2021-08-10 NOTE — TELEPHONE ENCOUNTER
Last OV relevant to medication: 8/3/21  Last refill date: 2/16/21     #/refills: 180/1  When pt was asked to return for OV: 6 months  Upcoming appt/reason: none scheduled  Was pt informed of any over due labs: not due yet    Lab Results   Component Value D

## 2021-08-27 ENCOUNTER — TELEPHONE (OUTPATIENT)
Dept: SURGERY | Facility: CLINIC | Age: 86
End: 2021-08-27

## 2021-08-27 RX ORDER — METHYLPREDNISOLONE 4 MG/1
TABLET ORAL
Qty: 1 EACH | Refills: 0 | Status: SHIPPED | OUTPATIENT
Start: 2021-08-27 | End: 2021-09-24

## 2021-08-27 NOTE — TELEPHONE ENCOUNTER
I can send medrol to the pharmacy and I can see her this Tuesday. She should discuss swelling with her PCP. Swelling would not be coming from her back.

## 2021-08-27 NOTE — TELEPHONE ENCOUNTER
I can send medrol to the pharmacy and I can see her this Tuesday. She should discuss swelling with her PCP. Swelling would not be coming from her back. Called pt and informed pt of Kristie Harris below note.  Recommended that patient does not take on em

## 2021-08-31 ENCOUNTER — OFFICE VISIT (OUTPATIENT)
Dept: SURGERY | Facility: CLINIC | Age: 86
End: 2021-08-31
Payer: MEDICARE

## 2021-08-31 ENCOUNTER — TELEPHONE (OUTPATIENT)
Dept: SURGERY | Facility: CLINIC | Age: 86
End: 2021-08-31

## 2021-08-31 VITALS
SYSTOLIC BLOOD PRESSURE: 118 MMHG | HEIGHT: 64 IN | BODY MASS INDEX: 28.17 KG/M2 | WEIGHT: 165 LBS | DIASTOLIC BLOOD PRESSURE: 62 MMHG

## 2021-08-31 DIAGNOSIS — E11.42 DIABETIC POLYNEUROPATHY ASSOCIATED WITH TYPE 2 DIABETES MELLITUS (HCC): ICD-10-CM

## 2021-08-31 DIAGNOSIS — E11.51 TYPE 2 DIABETES MELLITUS WITH ATHEROSCLEROSIS OF AORTA (HCC): ICD-10-CM

## 2021-08-31 DIAGNOSIS — M54.16 LUMBAR RADICULOPATHY: Primary | ICD-10-CM

## 2021-08-31 DIAGNOSIS — M48.061 SPINAL STENOSIS OF LUMBAR REGION WITHOUT NEUROGENIC CLAUDICATION: ICD-10-CM

## 2021-08-31 DIAGNOSIS — M54.16 LUMBAR RADICULOPATHY: ICD-10-CM

## 2021-08-31 DIAGNOSIS — M25.471 RIGHT ANKLE SWELLING: ICD-10-CM

## 2021-08-31 DIAGNOSIS — I70.0 TYPE 2 DIABETES MELLITUS WITH ATHEROSCLEROSIS OF AORTA (HCC): ICD-10-CM

## 2021-08-31 PROCEDURE — 3008F BODY MASS INDEX DOCD: CPT | Performed by: PHYSICIAN ASSISTANT

## 2021-08-31 PROCEDURE — 3078F DIAST BP <80 MM HG: CPT | Performed by: PHYSICIAN ASSISTANT

## 2021-08-31 PROCEDURE — 99213 OFFICE O/P EST LOW 20 MIN: CPT | Performed by: PHYSICIAN ASSISTANT

## 2021-08-31 PROCEDURE — 3074F SYST BP LT 130 MM HG: CPT | Performed by: PHYSICIAN ASSISTANT

## 2021-08-31 NOTE — PROGRESS NOTES
Hip pain on the right getting worse  Pain is going down the whole leg into feet  Constant sharp pain

## 2021-08-31 NOTE — PROGRESS NOTES
Neurosurgery Clinic Visit  2021    Blanca Kanner PCP:  Shaunna San MD    1935 MRN [id number]     CC:  Right Leg Pain     HPI:    Lisseth Brown is here for f/u. Her RLE pain continues to get worse.   Pain radiates from the hip into the do acute abnormality.  There is ossification near hamstring tendon origins at the ischial tuberosities bilaterally.           Past Medical History:   Diagnosis Date   • Acute, but ill-defined, cerebrovascular disease 1998   • Aortic stenosis, mild 10/29/2019 No      Drug use: No    Other Topics      Concerns:           Family History   Problem Relation Age of Onset   • Diabetes Mother 80         vascular disease   • Other (ruptured appendix) Father     • Other (vascular disease) Sister     • Cancer Brother   operation were discussed with patient at length. All questions were answered and the patient wishes to proceed.     Vy De La Rosa PA-C  [facility]  8/31/2021  8:44 AM

## 2021-09-01 ENCOUNTER — TELEPHONE (OUTPATIENT)
Dept: INTERNAL MEDICINE CLINIC | Facility: CLINIC | Age: 86
End: 2021-09-01

## 2021-09-01 NOTE — TELEPHONE ENCOUNTER
You are scheduled for Right L 4-5 microdiscectomy on 10/20/21 with Dr. Chago Seay. · PCP clearance is needed. We have faxed a request for pre-op clearance to you PCP Dr. Jac Mclean. Please contact their office for appointment.   ·   · You will need  pr admission. ·   · The hospital will contact you 1-2 days before surgery with your arrival time.    ·   · If you were on blood thinners (such as Coumadin, Pradaxa, Xarelto, etc) prior to surgery that we had you stop for surgery, please make sure you get inst by the [employer] in [employer] for your escort to class on the Ortho Spine unit. If unable to attend, class is available online at [email or web address].  Please call the Care Coordinator, Jairon Dang, with any questions, at [phone]

## 2021-09-01 NOTE — TELEPHONE ENCOUNTER
Per PSR Lucita, states LMTCB to schedule Pre-Op    Holding in Triage Pre-Op Bin until scheduled. Then forward to respective MA depending on which provider pt schedules with.

## 2021-09-01 NOTE — TELEPHONE ENCOUNTER
Date of pre-op appt:  TBD  Provider pre-op scheduled with: TBWILLIAM  Surgeon's name:  Dr Kelli Gonzalez   Phone #:  [phone] Option #3   Fax #:  [phone]  Surgery date:  10/20/21  Procedure/diagnosis: Spinal Stenosis    Placed in pre op bin

## 2021-09-01 NOTE — TELEPHONE ENCOUNTER
Patient is scheduled for 1 level microdiscectomy on 10/20/21 with Dr Danica Badillo.      form completed Y    Surgery order signed Lj Quinones on surgery sheet Y    Placed on outlook calendar Y    [employer] message sent to patient with sx instructions Y

## 2021-09-03 ENCOUNTER — TELEPHONE (OUTPATIENT)
Dept: INTERNAL MEDICINE CLINIC | Facility: CLINIC | Age: 86
End: 2021-09-03

## 2021-09-03 NOTE — TELEPHONE ENCOUNTER
Faxed Pre Op Testing Requirements to Pre Admin Testing Dept. Testing needs to be Ordered thru Dr. Kerry Gonzales per BE.     Appt needs to be made and completed thru Tammy Dept per BE.

## 2021-09-09 DIAGNOSIS — I10 ESSENTIAL HYPERTENSION: ICD-10-CM

## 2021-09-09 DIAGNOSIS — E11.51 TYPE 2 DIABETES MELLITUS WITH ATHEROSCLEROSIS OF AORTA (HCC): ICD-10-CM

## 2021-09-09 DIAGNOSIS — I70.0 TYPE 2 DIABETES MELLITUS WITH ATHEROSCLEROSIS OF AORTA (HCC): ICD-10-CM

## 2021-09-09 RX ORDER — GABAPENTIN 100 MG/1
CAPSULE ORAL
Qty: 120 CAPSULE | Refills: 0 | Status: ON HOLD | OUTPATIENT
Start: 2021-09-09 | End: 2021-10-20

## 2021-09-09 RX ORDER — BLOOD SUGAR DIAGNOSTIC
STRIP MISCELLANEOUS
Qty: 100 STRIP | Refills: 0 | Status: SHIPPED | OUTPATIENT
Start: 2021-09-09 | End: 2021-10-05

## 2021-09-09 RX ORDER — METOPROLOL SUCCINATE 25 MG/1
TABLET, EXTENDED RELEASE ORAL
Qty: 45 TABLET | Refills: 0 | Status: SHIPPED | OUTPATIENT
Start: 2021-09-09 | End: 2021-10-07

## 2021-09-09 RX ORDER — GLIMEPIRIDE 1 MG/1
1 TABLET ORAL
Qty: 90 TABLET | Refills: 0 | Status: SHIPPED | OUTPATIENT
Start: 2021-09-09 | End: 2021-10-07

## 2021-09-09 NOTE — TELEPHONE ENCOUNTER
Medication: gabapentin 100 mg    Date of last refill: 7-28-21  [place]    Last office visit: 8-31-21  Due back to clinic per last office note:  Post op  Date next office visit scheduled:  Sx 10-27    Last OV note recommendation:   Darlyne Siemens is suffering from R L5 radiculopathy secondary to significant L4-5 stenosis. She has not improved with medical management and CANDI. She would like to proceed with surgery. Offered R L4-5 microdiscectomy. XR Lumbar flex/ex. US R LE to r/o DVT pre-op. The risks, indication, options, and benefits of the operation were discussed with patient at length. All questions were answered and the patient wishes to proceed.

## 2021-09-11 NOTE — TELEPHONE ENCOUNTER
ReFaxed Pre Op Testing Requirements to Pre Admin Testing Dept. - 2nd Time    Testing needs to be Ordered thru Dr. Hector Kern per BE. Appt needs to be made and completed thru Tam[facility] Dept per BE.     Also faxed same info to Dr. Koehler Sessions office t

## 2021-09-23 ENCOUNTER — TELEPHONE (OUTPATIENT)
Dept: SURGERY | Facility: CLINIC | Age: 86
End: 2021-09-23

## 2021-09-28 ENCOUNTER — HOSPITAL ENCOUNTER (OUTPATIENT)
Dept: ULTRASOUND IMAGING | Age: 86
Discharge: HOME | End: 2021-09-28
Attending: PHYSICIAN ASSISTANT
Payer: MEDICARE

## 2021-09-28 ENCOUNTER — HOSPITAL ENCOUNTER (OUTPATIENT)
Dept: GENERAL RADIOLOGY | Age: 86
Discharge: HOME | End: 2021-09-28
Attending: PHYSICIAN ASSISTANT
Payer: MEDICARE

## 2021-09-28 DIAGNOSIS — M48.061 SPINAL STENOSIS OF LUMBAR REGION WITHOUT NEUROGENIC CLAUDICATION: ICD-10-CM

## 2021-09-28 DIAGNOSIS — M25.471 RIGHT ANKLE SWELLING: ICD-10-CM

## 2021-09-28 DIAGNOSIS — M54.16 LUMBAR RADICULOPATHY: ICD-10-CM

## 2021-09-28 PROCEDURE — 72114 X-RAY EXAM L-S SPINE BENDING: CPT | Performed by: PHYSICIAN ASSISTANT

## 2021-09-28 PROCEDURE — 93971 EXTREMITY STUDY: CPT | Performed by: PHYSICIAN ASSISTANT

## 2021-10-04 ENCOUNTER — OFFICE VISIT (OUTPATIENT)
Dept: INTERNAL MEDICINE CLINIC | Facility: CLINIC | Age: 86
End: 2021-10-04
Payer: MEDICARE

## 2021-10-04 VITALS
OXYGEN SATURATION: 98 % | WEIGHT: 165.13 LBS | HEIGHT: 64.57 IN | RESPIRATION RATE: 16 BRPM | BODY MASS INDEX: 27.85 KG/M2 | SYSTOLIC BLOOD PRESSURE: 130 MMHG | HEART RATE: 78 BPM | DIASTOLIC BLOOD PRESSURE: 64 MMHG | TEMPERATURE: 97 F

## 2021-10-04 DIAGNOSIS — I77.9 BILATERAL CAROTID ARTERY DISEASE, UNSPECIFIED TYPE (HCC): ICD-10-CM

## 2021-10-04 DIAGNOSIS — I70.0 TYPE 2 DIABETES MELLITUS WITH ATHEROSCLEROSIS OF AORTA (HCC): ICD-10-CM

## 2021-10-04 DIAGNOSIS — E11.9 TYPE 2 DIABETES MELLITUS WITHOUT COMPLICATION, WITHOUT LONG-TERM CURRENT USE OF INSULIN (HCC): Primary | ICD-10-CM

## 2021-10-04 DIAGNOSIS — E11.51 TYPE 2 DIABETES MELLITUS WITH ATHEROSCLEROSIS OF AORTA (HCC): ICD-10-CM

## 2021-10-04 DIAGNOSIS — M54.16 LUMBAR RADICULOPATHY: ICD-10-CM

## 2021-10-04 DIAGNOSIS — Z01.818 PREOPERATIVE CLEARANCE: ICD-10-CM

## 2021-10-04 DIAGNOSIS — I35.0 AORTIC STENOSIS, MILD: ICD-10-CM

## 2021-10-04 DIAGNOSIS — Z86.73 HISTORY OF STROKE: ICD-10-CM

## 2021-10-04 DIAGNOSIS — N18.4 CKD (CHRONIC KIDNEY DISEASE) STAGE 4, GFR 15-29 ML/MIN (HCC): ICD-10-CM

## 2021-10-04 DIAGNOSIS — I10 ESSENTIAL HYPERTENSION: ICD-10-CM

## 2021-10-04 PROCEDURE — 3008F BODY MASS INDEX DOCD: CPT | Performed by: INTERNAL MEDICINE

## 2021-10-04 PROCEDURE — 99215 OFFICE O/P EST HI 40 MIN: CPT | Performed by: INTERNAL MEDICINE

## 2021-10-04 PROCEDURE — 3078F DIAST BP <80 MM HG: CPT | Performed by: INTERNAL MEDICINE

## 2021-10-04 PROCEDURE — 3075F SYST BP GE 130 - 139MM HG: CPT | Performed by: INTERNAL MEDICINE

## 2021-10-04 RX ORDER — LANCETS 33 GAUGE
1 EACH MISCELLANEOUS AS NEEDED
Qty: 100 EACH | Refills: 3 | Status: SHIPPED | OUTPATIENT
Start: 2021-10-04

## 2021-10-04 RX ORDER — METFORMIN HYDROCHLORIDE 500 MG/1
TABLET, EXTENDED RELEASE ORAL
Qty: 360 TABLET | Refills: 1 | Status: ON HOLD | OUTPATIENT
Start: 2021-10-04 | End: 2021-10-20

## 2021-10-04 NOTE — PROGRESS NOTES
Luz Elena Naqvi is a 80year old female who presents for a pre-operative physical exam. Patient is to have L4-5 microdiscectomy for L5 radiculopathy failing consoervative treatment , to be done by Dr. Apple Meyers  at [address] [place] [address]  on  10/20/21.       HPI:   Pt complai 2021 vascular:   If her stenosis goes over 70% and she is asymptomatic, it may be an indication for surgery; however, due to her diabetes, age, and renal insufficiency, most vascular surgeons would continue to observe.   I probably might not do any interven Brother         lung   • Cancer Sister         lung      Social History:   Social History    Tobacco Use      Smoking status: Former Smoker        Quit date: 1998        Years since quittin.4      Smokeless tobacco: Never Used    Vaping Use      V 27.84 kg/m²     GENERAL: well developed, well nourished,in no apparent distress  SKIN: no rashes,no suspicious lesions, no open sores  HEENT: atraumatic, normocephalic,ears are clear  EYES:PERRLA, EOMI,conjunctiva are clear, no pallor or icterus  NECK: sup instructed to take her metoprolol on the morning of surgery    HTN_ controlled    High chol- on statin    Aortic stenosis- mild    40 minutes spent on provision of medical services today, including chart review, obtaining and reviewing history, performing

## 2021-10-04 NOTE — TELEPHONE ENCOUNTER
Prior Authorization for outpatient surgery initiated withGen at [address] [place] [address] [phone].   Requesting coverage for:1 level Lumbar Microdiscectomy  Date of Service: 10/20/2021   Inpatient days requested:0 outpatient  CPT codes: 09498    Re

## 2021-10-05 ENCOUNTER — TELEPHONE (OUTPATIENT)
Dept: INTERNAL MEDICINE CLINIC | Facility: CLINIC | Age: 86
End: 2021-10-05

## 2021-10-05 ENCOUNTER — LABORATORY ENCOUNTER (OUTPATIENT)
Dept: LAB | Facility: HOSPITAL | Age: 86
End: 2021-10-05
Attending: NEUROLOGICAL SURGERY
Payer: MEDICARE

## 2021-10-05 ENCOUNTER — EKG ENCOUNTER (OUTPATIENT)
Dept: LAB | Facility: HOSPITAL | Age: 86
End: 2021-10-05
Payer: MEDICARE

## 2021-10-05 DIAGNOSIS — M54.16 LUMBAR RADICULOPATHY: ICD-10-CM

## 2021-10-05 DIAGNOSIS — E11.51 TYPE 2 DIABETES MELLITUS WITH ATHEROSCLEROSIS OF AORTA (HCC): ICD-10-CM

## 2021-10-05 DIAGNOSIS — I70.0 TYPE 2 DIABETES MELLITUS WITH ATHEROSCLEROSIS OF AORTA (HCC): ICD-10-CM

## 2021-10-05 PROCEDURE — 85610 PROTHROMBIN TIME: CPT

## 2021-10-05 PROCEDURE — 93005 ELECTROCARDIOGRAM TRACING: CPT

## 2021-10-05 PROCEDURE — 93010 ELECTROCARDIOGRAM REPORT: CPT | Performed by: INTERNAL MEDICINE

## 2021-10-05 PROCEDURE — 85730 THROMBOPLASTIN TIME PARTIAL: CPT

## 2021-10-05 PROCEDURE — 36415 COLL VENOUS BLD VENIPUNCTURE: CPT

## 2021-10-05 PROCEDURE — 87081 CULTURE SCREEN ONLY: CPT

## 2021-10-05 PROCEDURE — 80053 COMPREHEN METABOLIC PANEL: CPT

## 2021-10-05 PROCEDURE — 85025 COMPLETE CBC W/AUTO DIFF WBC: CPT

## 2021-10-05 RX ORDER — BLOOD SUGAR DIAGNOSTIC
STRIP MISCELLANEOUS
Qty: 100 STRIP | Refills: 0 | Status: SHIPPED | OUTPATIENT
Start: 2021-10-05

## 2021-10-05 NOTE — TELEPHONE ENCOUNTER
Did the patient contact the pharmacy directly?:  No     Is patient out of meds or supply very low?:  7 days left     Medication Requested: Glucose Blood (ONETOUCH ULTRA) In Vitro Strip    Dose:      Is patient requesting a 30 or 90 day supply?:  90    Phar

## 2021-10-06 ENCOUNTER — HOSPITAL ENCOUNTER (OUTPATIENT)
Dept: PHYSICAL THERAPY | Facility: HOSPITAL | Age: 86
Discharge: HOME | End: 2021-10-06
Attending: NEUROLOGICAL SURGERY
Payer: MEDICARE

## 2021-10-06 DIAGNOSIS — M54.16 LUMBAR RADICULOPATHY: ICD-10-CM

## 2021-10-07 ENCOUNTER — ANESTHESIA EVENT (OUTPATIENT)
Dept: SURGERY | Facility: HOSPITAL | Age: 86
DRG: 519 | End: 2021-10-07
Payer: MEDICARE

## 2021-10-07 DIAGNOSIS — E11.51 TYPE 2 DIABETES MELLITUS WITH ATHEROSCLEROSIS OF AORTA (HCC): ICD-10-CM

## 2021-10-07 DIAGNOSIS — I70.0 TYPE 2 DIABETES MELLITUS WITH ATHEROSCLEROSIS OF AORTA (HCC): ICD-10-CM

## 2021-10-07 DIAGNOSIS — I10 ESSENTIAL HYPERTENSION: ICD-10-CM

## 2021-10-07 RX ORDER — GLIMEPIRIDE 1 MG/1
1 TABLET ORAL
Qty: 90 TABLET | Refills: 1 | Status: ON HOLD | OUTPATIENT
Start: 2021-10-07 | End: 2021-10-20

## 2021-10-07 RX ORDER — METOPROLOL SUCCINATE 25 MG/1
TABLET, EXTENDED RELEASE ORAL
Qty: 45 TABLET | Refills: 0 | Status: ON HOLD | OUTPATIENT
Start: 2021-10-07 | End: 2021-10-20

## 2021-10-11 NOTE — TELEPHONE ENCOUNTER
Please have her do BMP on Monday October 18  I have cleared her for surgery in the meantime  Confirm she increased her metformin as discussed please.

## 2021-10-11 NOTE — PROGRESS NOTES
Pre-op labs reviewed  GFR estimated a bit lower but overall stable. Advised to push fluids pre-op. - higher than previous.  Increased metformin last week and pt reports fastings between 156-170  Will recheck BMP next week prior to surgery but   PT

## 2021-10-11 NOTE — TELEPHONE ENCOUNTER
Spoke with pt  Stated her FBS today was 162 and yesterday was 156  Stated it was high on Thursday last week, 184  Pt stated FBS has been ranging from 162 to 184    Advised to hydrate well due to labs showing dehydration  Pt v/u  Advised to repeat labs but

## 2021-10-11 NOTE — TELEPHONE ENCOUNTER
Please have pt drink more fluids, kidneys look a bit dry  Her FBS is quite high  We increased her metformin, what are her fastings running now?   Need to clear her for next week's surgery

## 2021-10-11 NOTE — TELEPHONE ENCOUNTER
Per PCP on 10-4-21 \"We will check labs and barring unforeseen results, she will be cleared for surgery. \"    Message sent to Dr Artemio Keenan requesting clearance status.

## 2021-10-11 NOTE — TELEPHONE ENCOUNTER
Spoke with pt  Notified to repeat BMP on Monday  Advised to hydrate well to improve kidney function  Pt v/u  Pt stated she has been taking metformin 1000 mg BID

## 2021-10-12 NOTE — TELEPHONE ENCOUNTER
[employer] [phone] PA department per  automated system.  Surgery for 10/20/21 approved    Prior authorization request completed for: *1 level Lumbar Microdiscectomy  Authorization #[id number]  Authorization dates: 10/20/21  CPT codes approved: 9164

## 2021-10-18 ENCOUNTER — LAB ENCOUNTER (OUTPATIENT)
Dept: LAB | Facility: HOSPITAL | Age: 86
DRG: 519 | End: 2021-10-18
Attending: NEUROLOGICAL SURGERY
Payer: MEDICARE

## 2021-10-18 DIAGNOSIS — N18.4 CKD (CHRONIC KIDNEY DISEASE) STAGE 4, GFR 15-29 ML/MIN (HCC): ICD-10-CM

## 2021-10-18 DIAGNOSIS — E11.51 TYPE 2 DIABETES MELLITUS WITH ATHEROSCLEROSIS OF AORTA (HCC): Primary | ICD-10-CM

## 2021-10-18 DIAGNOSIS — I70.0 TYPE 2 DIABETES MELLITUS WITH ATHEROSCLEROSIS OF AORTA (HCC): ICD-10-CM

## 2021-10-18 DIAGNOSIS — E11.51 TYPE 2 DIABETES MELLITUS WITH ATHEROSCLEROSIS OF AORTA (HCC): ICD-10-CM

## 2021-10-18 DIAGNOSIS — M54.16 LUMBAR RADICULOPATHY: ICD-10-CM

## 2021-10-18 DIAGNOSIS — E11.42 DIABETIC POLYNEUROPATHY ASSOCIATED WITH TYPE 2 DIABETES MELLITUS (HCC): ICD-10-CM

## 2021-10-18 DIAGNOSIS — I70.0 TYPE 2 DIABETES MELLITUS WITH ATHEROSCLEROSIS OF AORTA (HCC): Primary | ICD-10-CM

## 2021-10-18 DIAGNOSIS — M48.061 SPINAL STENOSIS OF LUMBAR REGION WITHOUT NEUROGENIC CLAUDICATION: ICD-10-CM

## 2021-10-18 DIAGNOSIS — I10 ESSENTIAL HYPERTENSION: ICD-10-CM

## 2021-10-18 PROCEDURE — 36415 COLL VENOUS BLD VENIPUNCTURE: CPT

## 2021-10-18 PROCEDURE — 80048 BASIC METABOLIC PNL TOTAL CA: CPT

## 2021-10-20 ENCOUNTER — APPOINTMENT (OUTPATIENT)
Dept: GENERAL RADIOLOGY | Facility: HOSPITAL | Age: 86
DRG: 519 | End: 2021-10-20
Attending: NEUROLOGICAL SURGERY
Payer: MEDICARE

## 2021-10-20 ENCOUNTER — APPOINTMENT (OUTPATIENT)
Dept: GENERAL RADIOLOGY | Facility: HOSPITAL | Age: 86
DRG: 519 | End: 2021-10-20
Attending: INTERNAL MEDICINE
Payer: MEDICARE

## 2021-10-20 ENCOUNTER — ANESTHESIA (OUTPATIENT)
Dept: SURGERY | Facility: HOSPITAL | Age: 86
DRG: 519 | End: 2021-10-20
Payer: MEDICARE

## 2021-10-20 ENCOUNTER — HOSPITAL ENCOUNTER (INPATIENT)
Facility: HOSPITAL | Age: 86
LOS: 4 days | Discharge: NURSING, REHAB OR LONG-TERM CARE | DRG: 519 | End: 2021-10-25
Attending: NEUROLOGICAL SURGERY | Admitting: NEUROLOGICAL SURGERY
Payer: MEDICARE

## 2021-10-20 DIAGNOSIS — M54.16 LUMBAR RADICULOPATHY: Primary | ICD-10-CM

## 2021-10-20 DIAGNOSIS — R09.02 HYPOXIA: ICD-10-CM

## 2021-10-20 DIAGNOSIS — M48.061 SPINAL STENOSIS OF LUMBAR REGION WITHOUT NEUROGENIC CLAUDICATION: ICD-10-CM

## 2021-10-20 DIAGNOSIS — J43.2 CENTRILOBULAR EMPHYSEMA (HCC): ICD-10-CM

## 2021-10-20 PROBLEM — E11.9 DIABETES MELLITUS (HCC): Status: ACTIVE | Noted: 2019-01-29

## 2021-10-20 PROCEDURE — 3008F BODY MASS INDEX DOCD: CPT | Performed by: INTERNAL MEDICINE

## 2021-10-20 PROCEDURE — 3078F DIAST BP <80 MM HG: CPT | Performed by: INTERNAL MEDICINE

## 2021-10-20 PROCEDURE — 0SB20ZZ EXCISION OF LUMBAR VERTEBRAL DISC, OPEN APPROACH: ICD-10-PCS | Performed by: NEUROLOGICAL SURGERY

## 2021-10-20 PROCEDURE — 76000 FLUOROSCOPY <1 HR PHYS/QHP: CPT | Performed by: NEUROLOGICAL SURGERY

## 2021-10-20 PROCEDURE — 71045 X-RAY EXAM CHEST 1 VIEW: CPT | Performed by: INTERNAL MEDICINE

## 2021-10-20 PROCEDURE — 99223 1ST HOSP IP/OBS HIGH 75: CPT | Performed by: INTERNAL MEDICINE

## 2021-10-20 PROCEDURE — 3074F SYST BP LT 130 MM HG: CPT | Performed by: INTERNAL MEDICINE

## 2021-10-20 RX ORDER — ONDANSETRON 2 MG/ML
4 INJECTION INTRAMUSCULAR; INTRAVENOUS AS NEEDED
Status: DISCONTINUED | OUTPATIENT
Start: 2021-10-20 | End: 2021-10-20 | Stop reason: HOSPADM

## 2021-10-20 RX ORDER — LIDOCAINE HYDROCHLORIDE 10 MG/ML
INJECTION, SOLUTION EPIDURAL; INFILTRATION; INTRACAUDAL; PERINEURAL AS NEEDED
Status: DISCONTINUED | OUTPATIENT
Start: 2021-10-20 | End: 2021-10-20 | Stop reason: SURG

## 2021-10-20 RX ORDER — HYDROMORPHONE HYDROCHLORIDE 1 MG/ML
0.4 INJECTION, SOLUTION INTRAMUSCULAR; INTRAVENOUS; SUBCUTANEOUS EVERY 5 MIN PRN
Status: DISCONTINUED | OUTPATIENT
Start: 2021-10-20 | End: 2021-10-20 | Stop reason: HOSPADM

## 2021-10-20 RX ORDER — MORPHINE SULFATE 2 MG/ML
2 INJECTION, SOLUTION INTRAMUSCULAR; INTRAVENOUS EVERY 2 HOUR PRN
Status: DISCONTINUED | OUTPATIENT
Start: 2021-10-20 | End: 2021-10-25

## 2021-10-20 RX ORDER — DEXAMETHASONE SODIUM PHOSPHATE 4 MG/ML
VIAL (ML) INJECTION AS NEEDED
Status: DISCONTINUED | OUTPATIENT
Start: 2021-10-20 | End: 2021-10-20 | Stop reason: SURG

## 2021-10-20 RX ORDER — ALLOPURINOL 100 MG/1
200 TABLET ORAL DAILY
Status: DISCONTINUED | OUTPATIENT
Start: 2021-10-20 | End: 2021-10-25

## 2021-10-20 RX ORDER — HYDROCODONE BITARTRATE AND ACETAMINOPHEN 5; 325 MG/1; MG/1
1 TABLET ORAL AS NEEDED
Status: DISCONTINUED | OUTPATIENT
Start: 2021-10-20 | End: 2021-10-20 | Stop reason: HOSPADM

## 2021-10-20 RX ORDER — SODIUM CHLORIDE 9 MG/ML
INJECTION, SOLUTION INTRAVENOUS CONTINUOUS
Status: DISCONTINUED | OUTPATIENT
Start: 2021-10-20 | End: 2021-10-25

## 2021-10-20 RX ORDER — ROCURONIUM BROMIDE 10 MG/ML
INJECTION, SOLUTION INTRAVENOUS AS NEEDED
Status: DISCONTINUED | OUTPATIENT
Start: 2021-10-20 | End: 2021-10-20 | Stop reason: SURG

## 2021-10-20 RX ORDER — METHYLPREDNISOLONE ACETATE 80 MG/ML
INJECTION, SUSPENSION INTRA-ARTICULAR; INTRALESIONAL; INTRAMUSCULAR; SOFT TISSUE AS NEEDED
Status: DISCONTINUED | OUTPATIENT
Start: 2021-10-20 | End: 2021-10-20 | Stop reason: HOSPADM

## 2021-10-20 RX ORDER — DEXTROSE MONOHYDRATE 25 G/50ML
50 INJECTION, SOLUTION INTRAVENOUS
Status: DISCONTINUED | OUTPATIENT
Start: 2021-10-20 | End: 2021-10-20 | Stop reason: HOSPADM

## 2021-10-20 RX ORDER — ACETAMINOPHEN 325 MG/1
650 TABLET ORAL EVERY 4 HOURS PRN
Status: DISCONTINUED | OUTPATIENT
Start: 2021-10-20 | End: 2021-10-25

## 2021-10-20 RX ORDER — SODIUM CHLORIDE, SODIUM LACTATE, POTASSIUM CHLORIDE, CALCIUM CHLORIDE 600; 310; 30; 20 MG/100ML; MG/100ML; MG/100ML; MG/100ML
INJECTION, SOLUTION INTRAVENOUS CONTINUOUS
Status: DISCONTINUED | OUTPATIENT
Start: 2021-10-20 | End: 2021-10-20

## 2021-10-20 RX ORDER — MORPHINE SULFATE 0.5 MG/ML
INJECTION, SOLUTION EPIDURAL; INTRATHECAL; INTRAVENOUS AS NEEDED
Status: DISCONTINUED | OUTPATIENT
Start: 2021-10-20 | End: 2021-10-20 | Stop reason: HOSPADM

## 2021-10-20 RX ORDER — SENNOSIDES 8.6 MG
17.2 TABLET ORAL NIGHTLY
Status: DISCONTINUED | OUTPATIENT
Start: 2021-10-20 | End: 2021-10-25

## 2021-10-20 RX ORDER — GLIMEPIRIDE 1 MG/1
1 TABLET ORAL
COMMUNITY

## 2021-10-20 RX ORDER — ACETAMINOPHEN 500 MG
1000 TABLET ORAL ONCE
Status: DISCONTINUED | OUTPATIENT
Start: 2021-10-20 | End: 2021-10-21 | Stop reason: ALTCHOICE

## 2021-10-20 RX ORDER — METFORMIN HYDROCHLORIDE 500 MG/1
500 TABLET, EXTENDED RELEASE ORAL 2 TIMES DAILY WITH MEALS
COMMUNITY

## 2021-10-20 RX ORDER — HYDROCODONE BITARTRATE AND ACETAMINOPHEN 5; 325 MG/1; MG/1
2 TABLET ORAL EVERY 4 HOURS PRN
Status: DISCONTINUED | OUTPATIENT
Start: 2021-10-20 | End: 2021-10-25

## 2021-10-20 RX ORDER — CEFAZOLIN SODIUM/WATER 2 G/20 ML
2 SYRINGE (ML) INTRAVENOUS ONCE
Status: COMPLETED | OUTPATIENT
Start: 2021-10-20 | End: 2021-10-20

## 2021-10-20 RX ORDER — HYDROCODONE BITARTRATE AND ACETAMINOPHEN 5; 325 MG/1; MG/1
2 TABLET ORAL AS NEEDED
Status: DISCONTINUED | OUTPATIENT
Start: 2021-10-20 | End: 2021-10-20 | Stop reason: HOSPADM

## 2021-10-20 RX ORDER — ATORVASTATIN CALCIUM 20 MG/1
20 TABLET, FILM COATED ORAL NIGHTLY
Status: DISCONTINUED | OUTPATIENT
Start: 2021-10-20 | End: 2021-10-25

## 2021-10-20 RX ORDER — CEFAZOLIN SODIUM/WATER 2 G/20 ML
SYRINGE (ML) INTRAVENOUS
Status: DISPENSED
Start: 2021-10-20 | End: 2021-10-20

## 2021-10-20 RX ORDER — CEFAZOLIN SODIUM/WATER 2 G/20 ML
2 SYRINGE (ML) INTRAVENOUS EVERY 8 HOURS
Status: COMPLETED | OUTPATIENT
Start: 2021-10-20 | End: 2021-10-21

## 2021-10-20 RX ORDER — LABETALOL HYDROCHLORIDE 5 MG/ML
INJECTION, SOLUTION INTRAVENOUS AS NEEDED
Status: DISCONTINUED | OUTPATIENT
Start: 2021-10-20 | End: 2021-10-20 | Stop reason: SURG

## 2021-10-20 RX ORDER — METOPROLOL SUCCINATE 25 MG/1
12.5 TABLET, EXTENDED RELEASE ORAL DAILY
COMMUNITY

## 2021-10-20 RX ORDER — ONDANSETRON 2 MG/ML
INJECTION INTRAMUSCULAR; INTRAVENOUS AS NEEDED
Status: DISCONTINUED | OUTPATIENT
Start: 2021-10-20 | End: 2021-10-20 | Stop reason: SURG

## 2021-10-20 RX ORDER — DEXTROSE MONOHYDRATE 25 G/50ML
50 INJECTION, SOLUTION INTRAVENOUS
Status: DISCONTINUED | OUTPATIENT
Start: 2021-10-20 | End: 2021-10-20

## 2021-10-20 RX ORDER — SODIUM CHLORIDE, SODIUM LACTATE, POTASSIUM CHLORIDE, CALCIUM CHLORIDE 600; 310; 30; 20 MG/100ML; MG/100ML; MG/100ML; MG/100ML
INJECTION, SOLUTION INTRAVENOUS CONTINUOUS
Status: DISCONTINUED | OUTPATIENT
Start: 2021-10-20 | End: 2021-10-20 | Stop reason: HOSPADM

## 2021-10-20 RX ORDER — ATORVASTATIN CALCIUM 20 MG/1
20 TABLET, FILM COATED ORAL NIGHTLY
COMMUNITY
End: 2022-01-10

## 2021-10-20 RX ORDER — BUPIVACAINE HYDROCHLORIDE AND EPINEPHRINE 2.5; 5 MG/ML; UG/ML
INJECTION, SOLUTION EPIDURAL; INFILTRATION; INTRACAUDAL; PERINEURAL AS NEEDED
Status: DISCONTINUED | OUTPATIENT
Start: 2021-10-20 | End: 2021-10-20 | Stop reason: HOSPADM

## 2021-10-20 RX ORDER — ALLOPURINOL 100 MG/1
200 TABLET ORAL DAILY
COMMUNITY

## 2021-10-20 RX ORDER — PROCHLORPERAZINE EDISYLATE 5 MG/ML
10 INJECTION INTRAMUSCULAR; INTRAVENOUS EVERY 6 HOURS PRN
Status: ACTIVE | OUTPATIENT
Start: 2021-10-20 | End: 2021-10-22

## 2021-10-20 RX ORDER — GABAPENTIN 100 MG/1
100 CAPSULE ORAL 2 TIMES DAILY
COMMUNITY

## 2021-10-20 RX ORDER — CYCLOBENZAPRINE HCL 5 MG
2.5 TABLET ORAL EVERY 6 HOURS PRN
Status: DISCONTINUED | OUTPATIENT
Start: 2021-10-20 | End: 2021-10-25

## 2021-10-20 RX ORDER — DIPHENHYDRAMINE HCL 25 MG
25 CAPSULE ORAL EVERY 4 HOURS PRN
Status: DISCONTINUED | OUTPATIENT
Start: 2021-10-20 | End: 2021-10-25

## 2021-10-20 RX ORDER — MORPHINE SULFATE 4 MG/ML
4 INJECTION, SOLUTION INTRAMUSCULAR; INTRAVENOUS EVERY 2 HOUR PRN
Status: DISCONTINUED | OUTPATIENT
Start: 2021-10-20 | End: 2021-10-25

## 2021-10-20 RX ORDER — MORPHINE SULFATE 2 MG/ML
1 INJECTION, SOLUTION INTRAMUSCULAR; INTRAVENOUS EVERY 2 HOUR PRN
Status: DISCONTINUED | OUTPATIENT
Start: 2021-10-20 | End: 2021-10-25

## 2021-10-20 RX ORDER — DIPHENHYDRAMINE HYDROCHLORIDE 50 MG/ML
25 INJECTION INTRAMUSCULAR; INTRAVENOUS EVERY 4 HOURS PRN
Status: DISCONTINUED | OUTPATIENT
Start: 2021-10-20 | End: 2021-10-25

## 2021-10-20 RX ORDER — ONDANSETRON 2 MG/ML
4 INJECTION INTRAMUSCULAR; INTRAVENOUS EVERY 4 HOURS PRN
Status: ACTIVE | OUTPATIENT
Start: 2021-10-20 | End: 2021-10-21

## 2021-10-20 RX ORDER — ACETAMINOPHEN 500 MG
1000 TABLET ORAL ONCE AS NEEDED
Status: DISCONTINUED | OUTPATIENT
Start: 2021-10-20 | End: 2021-10-20 | Stop reason: HOSPADM

## 2021-10-20 RX ORDER — GABAPENTIN 100 MG/1
100 CAPSULE ORAL 2 TIMES DAILY
Status: DISCONTINUED | OUTPATIENT
Start: 2021-10-20 | End: 2021-10-25

## 2021-10-20 RX ORDER — PHENYLEPHRINE HCL 10 MG/ML
VIAL (ML) INJECTION AS NEEDED
Status: DISCONTINUED | OUTPATIENT
Start: 2021-10-20 | End: 2021-10-20 | Stop reason: SURG

## 2021-10-20 RX ORDER — DEXTROSE MONOHYDRATE 25 G/50ML
50 INJECTION, SOLUTION INTRAVENOUS
Status: DISCONTINUED | OUTPATIENT
Start: 2021-10-20 | End: 2021-10-25

## 2021-10-20 RX ORDER — NALOXONE HYDROCHLORIDE 0.4 MG/ML
80 INJECTION, SOLUTION INTRAMUSCULAR; INTRAVENOUS; SUBCUTANEOUS AS NEEDED
Status: DISCONTINUED | OUTPATIENT
Start: 2021-10-20 | End: 2021-10-20 | Stop reason: HOSPADM

## 2021-10-20 RX ORDER — METOCLOPRAMIDE HYDROCHLORIDE 5 MG/ML
INJECTION INTRAMUSCULAR; INTRAVENOUS AS NEEDED
Status: DISCONTINUED | OUTPATIENT
Start: 2021-10-20 | End: 2021-10-20 | Stop reason: SURG

## 2021-10-20 RX ORDER — HYDROCODONE BITARTRATE AND ACETAMINOPHEN 5; 325 MG/1; MG/1
1 TABLET ORAL EVERY 4 HOURS PRN
Status: DISCONTINUED | OUTPATIENT
Start: 2021-10-20 | End: 2021-10-25

## 2021-10-20 RX ADMIN — METOCLOPRAMIDE HYDROCHLORIDE 10 MG: 5 INJECTION INTRAMUSCULAR; INTRAVENOUS at 13:00:00

## 2021-10-20 RX ADMIN — LABETALOL HYDROCHLORIDE 5 MG: 5 INJECTION, SOLUTION INTRAVENOUS at 14:18:00

## 2021-10-20 RX ADMIN — SODIUM CHLORIDE, SODIUM LACTATE, POTASSIUM CHLORIDE, CALCIUM CHLORIDE: 600; 310; 30; 20 INJECTION, SOLUTION INTRAVENOUS at 12:48:00

## 2021-10-20 RX ADMIN — PHENYLEPHRINE HCL 100 MCG: 10 MG/ML VIAL (ML) INJECTION at 13:28:00

## 2021-10-20 RX ADMIN — PHENYLEPHRINE HCL 50 MCG: 10 MG/ML VIAL (ML) INJECTION at 12:53:00

## 2021-10-20 RX ADMIN — SODIUM CHLORIDE, SODIUM LACTATE, POTASSIUM CHLORIDE, CALCIUM CHLORIDE: 600; 310; 30; 20 INJECTION, SOLUTION INTRAVENOUS at 14:17:00

## 2021-10-20 RX ADMIN — ROCURONIUM BROMIDE 50 MG: 10 INJECTION, SOLUTION INTRAVENOUS at 12:54:00

## 2021-10-20 RX ADMIN — CEFAZOLIN SODIUM/WATER 2 G: 2 G/20 ML SYRINGE (ML) INTRAVENOUS at 13:06:00

## 2021-10-20 RX ADMIN — PHENYLEPHRINE HCL 100 MCG: 10 MG/ML VIAL (ML) INJECTION at 13:06:00

## 2021-10-20 RX ADMIN — ONDANSETRON 4 MG: 2 INJECTION INTRAMUSCULAR; INTRAVENOUS at 13:00:00

## 2021-10-20 RX ADMIN — PHENYLEPHRINE HCL 50 MCG: 10 MG/ML VIAL (ML) INJECTION at 12:58:00

## 2021-10-20 RX ADMIN — PHENYLEPHRINE HCL 50 MCG: 10 MG/ML VIAL (ML) INJECTION at 13:34:00

## 2021-10-20 RX ADMIN — LIDOCAINE HYDROCHLORIDE 50 MG: 10 INJECTION, SOLUTION EPIDURAL; INFILTRATION; INTRACAUDAL; PERINEURAL at 12:48:00

## 2021-10-20 RX ADMIN — PHENYLEPHRINE HCL 50 MCG: 10 MG/ML VIAL (ML) INJECTION at 13:23:00

## 2021-10-20 RX ADMIN — DEXAMETHASONE SODIUM PHOSPHATE 4 MG: 4 MG/ML VIAL (ML) INJECTION at 13:00:00

## 2021-10-20 NOTE — OPERATIVE REPORT
Operative Note   Patient Name: Isabella Guzman  7/6/1935  10/20/2021  Preoperative Diagnosis: lumbar disc herniation with radiculopathy    Postoperative Diagnosis: same   Primary Surgeon: Nissa Dumont M.D.    Assistant: quynh Resendez closed in layers. exofin for the skin. Patient was taken off the OR table and awakened by anesthesia.      Dictated but not proofread

## 2021-10-20 NOTE — BRIEF OP NOTE
Pre-Operative Diagnosis: Lumbar radiculopathy [M54.16]  Spinal stenosis of lumbar region without neurogenic claudication [M48.061]     Post-Operative Diagnosis: Lumbar radiculopathy [M54.16]Spinal stenosis of lumbar region without neurogenic claudication [

## 2021-10-20 NOTE — CONSULTS
[facility]  CONSULT     Goyo Khanna Patient Status:  Outpatient in a Bed    1935 MRN [id number]   [facility] 3SW-A Attending Lavenia Barthel, MD   Hosp Day # 0 PCP Babita Hinton MD     Reason for consult: me ckd stage 4   • Stroke [facility]) 1998   • Type II or unspecified type diabetes mellitus without mention of complication, not stated as uncontrolled    • Unspecified essential hypertension    • Visual impairment     readers        Past Surgical History:   Past meals., Disp: , Rfl:   metoprolol succinate 25 MG Oral Tablet 24 Hr, Take 12.5 mg by mouth daily. , Disp: , Rfl:   gabapentin 100 MG Oral Cap, Take 200 mg by mouth 3 (three) times daily. As tolerated. , Disp: , Rfl:   Sertraline HCl 50 MG Oral Tab, Take 1 ta 10/18/2021       Pro-Calcitonin  No results for input(s): PCT in the last 168 hours. Cardiac  No results for input(s): TROP, PBNP in the last 168 hours. Creatinine Kinase  No results for input(s): CK in the last 168 hours.     Inflammatory Markers  No

## 2021-10-20 NOTE — ANESTHESIA PROCEDURE NOTES
Airway  Date/Time: 10/20/2021 12:56 PM  Urgency: elective      General Information and Staff    Patient location during procedure: OR  Anesthesiologist: Kalpana Gonzáles MD  Performed: anesthesiologist     Indications and Patient Condition  Indication

## 2021-10-20 NOTE — ANESTHESIA POSTPROCEDURE EVALUATION
[facility]    Martha Waite Patient Status:  Outpatient in a Bed   Age/Gender 80year old female MRN [id number]   [facility] SURGERY Attending Christiane Arauz MD   Hosp Day # 0 PCP Nicolas Howe MD       Anesthesia Post-

## 2021-10-20 NOTE — ANESTHESIA PREPROCEDURE EVALUATION
PRE-OP EVALUATION    Patient Name: [facility]    Admit Diagnosis: Lumbar radiculopathy [M54.16]  Spinal stenosis of lumbar region without neurogenic claudication [M48.061]    Pre-op Diagnosis: Lumbar radiculopathy [M54.16]  Spinal stenosis of lumbar valve area (Vmax): 1.34cm^2/m^2.   3. Mitral valve: Mildly calcified annulus. Mitral valve demonstrates mildly      calcified leaflets. 4. Right ventricle: Systolic pressure was mildly increased. 5. Pulmonary arteries: PA peak pressure: 38mm Hg (S). 21.0 10/18/2021    BUN 38 (H) 10/18/2021    CREATSERUM 1.74 (H) 10/18/2021     (H) 10/18/2021    CA 10.3 (H) 10/18/2021     Lab Results   Component Value Date    INR 0.98 10/05/2021         Airway      Mallampati: III  Mouth opening: >3 FB  TM dista

## 2021-10-20 NOTE — H&P
Neurosurgery Pre-OP H&P  10/20/2021    [address] PCP:  Ash Adkins MD    1935 MRN [id number]     CC:  Right Leg Pain     HPI:       Ramesh Dela Cruz is a very pleasant 80year old female with PMH of CVA 20 years ago, CKD who presents with 4 m [facility] 5/25/2021 2021 vascular:   If her stenosis goes over 70% and she is asymptomatic, it may be an indication for surgery; however, due to her diabetes, age, and renal insufficiency, most vascular surgeons would continue to observe. Humberto Allen positives and negatives are noted in HPI. Physical Exam:   10/20/21  1140   BP: 152/49   Pulse: 73   Resp: 16   Temp: 97.3 °F (36.3 °C)   Body mass index is 27.81 kg/m².   General: No Apparent Distress, Well Nourished, Well Developed, Normal Voice, Mood A

## 2021-10-21 ENCOUNTER — APPOINTMENT (OUTPATIENT)
Dept: CT IMAGING | Facility: HOSPITAL | Age: 86
DRG: 519 | End: 2021-10-21
Attending: HOSPITALIST
Payer: MEDICARE

## 2021-10-21 PROCEDURE — 71250 CT THORAX DX C-: CPT | Performed by: HOSPITALIST

## 2021-10-21 PROCEDURE — 99232 SBSQ HOSP IP/OBS MODERATE 35: CPT | Performed by: HOSPITALIST

## 2021-10-21 RX ORDER — ALBUTEROL SULFATE 2.5 MG/3ML
2.5 SOLUTION RESPIRATORY (INHALATION)
Status: DISCONTINUED | OUTPATIENT
Start: 2021-10-21 | End: 2021-10-23

## 2021-10-21 NOTE — OCCUPATIONAL THERAPY NOTE
Attempted to see the pt for OT evaluation. Leaving the room for Chest CT. OT will re-attempt to see the pt.

## 2021-10-21 NOTE — CONSULTS
[facility]  Report of Consultation    Apolinar Khanna Patient Status:  Outpatient in a Bed    1935 MRN [id number]   [facility] 3SW-A Attending Kelley Cobb MD   Hosp Day # 0 PCP Jung Farrell MD     Reason for an every year or every other    • Bilateral carotid artery occlusion 9/26/2019   • DDD (degenerative disc disease), lumbar 6/2/2021   • Depression    • Diabetic polyneuropathy associated with type 2 diabetes mellitus ([facility] 4/4/2019   • Esophageal reflux Tablet 24 Hr, Take 12.5 mg by mouth daily. , Disp: , Rfl: , 10/20/2021 at 0800  gabapentin 100 MG Oral Cap, Take 200 mg by mouth 3 (three) times daily. As tolerated. , Disp: , Rfl: , 10/19/2021 at 2000  Sertraline HCl 50 MG Oral Tab, Take 1 tablet (50 mg tot 153/60 — — 72 13 94 %   10/20/21 1610 — — — 69 13 100 %   10/20/21 1605 — — — 70 11 100 %   10/20/21 1600 137/65 — — 71 16 96 %   10/20/21 1555 — — — 70 13 100 %   10/20/21 1550 — — — 70 12 100 %   10/20/21 1545 148/67 — — 68 17 99 %   10/20/21 1540 — — — masses. PA and lateral views recommended for further evaluation.     Dictated by (CST): Ezra Hudson MD on 10/20/2021 at 9:36 PM     Finalized by (CST): Ezra Hudson MD on 10/20/2021 at 9:37 PM       CT chest reviewed there is evidence of left

## 2021-10-21 NOTE — PROGRESS NOTES
[facility]  Neurosurgery Progress Note    Sho Khanna Patient Status:  Outpatient in a Bed    1935 MRN [id number]   [facility] 3SW-A Attending Janet Fowler MD   Hosp Day # 0 PCP Messi Clements MD     Chief Com

## 2021-10-21 NOTE — PROGRESS NOTES
[facility]     Hospitalist Progress Note     Trellhoward Tyi Patient Status:  Outpatient in a Bed    1935 MRN [id number]   [facility] Att[facility] Josef Mario MD   Hosp Day # 0 PCP Gladis Soto MD     Chief atorvastatin  20 mg Oral Nightly   • gabapentin  100 mg Oral BID   • metoprolol succinate  12.5 mg Oral Daily   • sertraline  50 mg Oral Daily   • Senna  17.2 mg Oral Nightly   • Insulin Aspart Pen  1-10 Units Subcutaneous TID AC and HS       Assessment &

## 2021-10-21 NOTE — PROGRESS NOTES
Reviewed indications, side effects of pain medication/narcotics and constipation prevention.  Stressed importance of increased fluids/roughage in diet, continued use stool softeners along with laxatives and suppositories as needed while taking narcotics rubin

## 2021-10-21 NOTE — PLAN OF CARE
Patient A&O X4 on 4L O2- denies SOB. CXR completed- results relayed to on call MD, received orders for CXR PA/lateral. VSS, /IS. SCDs/TEDs. Voiding freely, LBM 10/19. Surgical incision to lower back covered with dermabond, c/d/i. Denies n/t.  Denies pain

## 2021-10-21 NOTE — CM/SW NOTE
10/21/21 1200   CM/SW Referral Data   Referral Source Physician   Reason for Referral Discharge planning   Informant Patient   Patient [address]   Patient lives with Alone   Patient Status Prior to Admission   Independe

## 2021-10-21 NOTE — PAYOR COMM NOTE
--------------  CONTINUED STAY REVIEW    Payor: Marlyn Malikyeny Moses 673 #:  [id number]  Authorization Number: [id number]    Admit date: N/A  Admit time: N/A    Admitting Physician: Trudy Maddox MD  Attending Physician:  Trudy Maddox MD Emphysematous changes with pleural parenchymal scarring.     2. Left lower lobe atelectasis and or scar medially.       Component   Ref Range & Units 10/21/21 0745   POC Glucose   70 - 99 mg/dL 160 High       Physical Exam:       Respiratory:  Respirations Route User    10/21/2021 0933 Given 12.5 mg Oral Scherry Dakin, RN      [facility]) tab 17.2 mg     Date Action Dose Route User    10/20/2021 2124 Given 17.2 mg Oral Lawrence Mendiola RN         0.9% NaCl infusion     Date Action Dose Route User

## 2021-10-21 NOTE — PROGRESS NOTES
Patient received post op. Alert and oriented. Back incision clean and dry. Eager to eat. Patient on 4LNC with 02 sat 92% on arrival to floor, but dips to 85% at times, she denies any shortness of breath. Has smoking history. Mild cough noted.  Non productiv

## 2021-10-21 NOTE — PLAN OF CARE
Patient alert and oriented x4. VSS, 3-4L via NC. Denies SOB. No reports of numbness or tingling. Patient states pain is minimal to none. Dermabond to incision site. SCD's in place, patient ambulates x1 person assist. Voiding freely in the bathroom.  IV Roce

## 2021-10-21 NOTE — PHYSICAL THERAPY NOTE
PHYSICAL THERAPY EVALUATION - INPATIENT     Room Number: 378/378-A  Evaluation Date: 10/21/2021  Type of Evaluation: Initial  Physician Order: PT Eval and Treat    Presenting Problem: s/p R L4-L5 microdiscectomy 10/20/21  Reason for Therapy: Mobility complication, not stated as uncontrolled    • Unspecified essential hypertension    • Visual impairment     readers       Past Surgical History  Past Surgical History:   Procedure Laterality Date   • ANGIOGRAM     • CATARACT  2016, 2017    Jenise   • CO ext 4/5  B ankle dorsiflex 4/5    BALANCE  Static Sitting: Fair  Dynamic Sitting: Fair -  Static Standing: Fair -  Dynamic Standing: Poor +    ADDITIONAL TESTS                                    NEUROLOGICAL FINDINGS  Neurological Findings: Sensation maintain standing balance as pt drifting posteriorly. Pt was standing at RW near EOB while this writer was tying pt hospital gown and pt lost her balance and abruptly sat down at EOB. Pt denies dizziness.   Pt attempted to sit to stand to RW with CGA and RECOMMENDATIONS  PT Discharge Recommendations: Sub-acute rehabilitation    PLAN  PT Treatment Plan: Bed mobility; Energy conservation;Patient education; Family education; Neuromuscular re-educate;Strengthening;Transfer training;Balance training  [facility]
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
## 2021-10-21 NOTE — PLAN OF CARE
Goals discussed with patient at bedside.      Problem: Patient/Family Goals  Goal: Patient/Family Long Term Goal  Description: Patient's Long Term Goal: \"To have less low back pain and walk longer with less pain\"    Interventions:  - Take pain meds as nee

## 2021-10-21 NOTE — CM/SW NOTE
SW provided available ROSALINDA list. Pt chose [facility] made aware. YFN completed. RN/SW to update [facility] once dc [phone].  Pt is agreeable to use medicar service and is aware of cost.     GENEVA Jeter

## 2021-10-22 PROCEDURE — 99024 POSTOP FOLLOW-UP VISIT: CPT | Performed by: PHYSICIAN ASSISTANT

## 2021-10-22 PROCEDURE — 99232 SBSQ HOSP IP/OBS MODERATE 35: CPT | Performed by: HOSPITALIST

## 2021-10-22 RX ORDER — HYDROCODONE BITARTRATE AND ACETAMINOPHEN 5; 325 MG/1; MG/1
1 TABLET ORAL EVERY 4 HOURS PRN
Qty: 20 TABLET | Refills: 0 | Status: SHIPPED | OUTPATIENT
Start: 2021-10-22 | End: 2021-10-25

## 2021-10-22 NOTE — PROGRESS NOTES
[facility]     Hospitalist Progress Note     Renee Sage Patient Status:  Outpatient in a Bed    1935 MRN [id number]   [facility] 3SW-A Attending Eula Hunter MD   Hosp Day # 1 PCP Isamar Menjivar MD     Chief in [place].     Medications:   • albuterol  2.5 mg Nebulization 4 times per day   • cefTRIAXone  1 g Intravenous Q24H   • allopurinol  200 mg Oral Daily   • atorvastatin  20 mg Oral Nightly   • gabapentin  100 mg Oral BID   • metoprolol succinate  12.5 mg Oral

## 2021-10-22 NOTE — PROGRESS NOTES
[facility] Pulmonary/Critical Care Progress Note     SUBJECTIVE/Interval history: All events, procedures, notes reviewed. Pt is less sob. Requiring 2 L O2. Denies cp.  Improves sat with IS      Review of Systems:   A com Recent Labs   Lab 10/22/21  0558   RBC 3.28*   HGB 11.2*   HCT 35.1   .0*   MCH 34.1*   MCHC 31.9   RDW 13.6   NEPRELIM 9.27*   WBC 12.0*   .0*     No results for input(s): BNP in the last 168 hours.   No results for input(s): TROP, CK in Intravenous Q24H   • allopurinol  200 mg Oral Daily   • atorvastatin  20 mg Oral Nightly   • gabapentin  100 mg Oral BID   • metoprolol succinate  12.5 mg Oral Daily   • sertraline  50 mg Oral Daily   • Senna  17.2 mg Oral Nightly   • Insulin Aspart Pen  1

## 2021-10-22 NOTE — PLAN OF CARE
Patient A&O X4 on 4L O2- denies SOB. O2 sats ranging from 86%-96% during the night. CT chest completed during the day, pulmonology following. VSS, /IS. SCDs/TEDs. Voiding freely, LBM 10/19. Surgical incision to lower back covered with dermabond, c/d/i.

## 2021-10-22 NOTE — PROGRESS NOTES
[facility]  Neurosurgery Progress Note    Bhavna Khanna Patient Status:  Outpatient in a Bed    1935 MRN [id number]   [facility] 3SW-A Attending Vikki Fowler MD   Hosp Day # 1 PCP Margot Alfonso MD     Chief Com discharge from daniel perspective  Instructions given

## 2021-10-22 NOTE — CM/SW NOTE
NAHUM asked [facility] on 10/21 at 4:00 Pm to start insurance auth for pt. NAHUM was notified at 12:30 on 10/22 that there was not a bed available and next one available will be on Monday.   NAHUM asked for [facility] to start insurance auth as with her insurance

## 2021-10-22 NOTE — PHYSICAL THERAPY NOTE
PHYSICAL THERAPY TREATMENT NOTE - INPATIENT    Room Number: 378/378-A     Session: 1     Number of Visits to Meet Established Goals: 4    Presenting Problem: s/p R L4-L5 microdiscectomy 10/20/21    ASSESSMENT     Pt with improved gait distance today, al Static Standing: Fair -  Dynamic Standing: Poor +    ACTIVITY TOLERANCE                         O2 WALK  Oxygen Therapy  SPO2% on Oxygen at Rest: 94  At rest oxygen flow (liters per minute): 2  SPO2% Ambulation on Oxygen: 89  Ambulation oxygen flow (lite gloves during session    Patient/Family PPE: Mask at all times

## 2021-10-23 ENCOUNTER — APPOINTMENT (OUTPATIENT)
Dept: ULTRASOUND IMAGING | Facility: HOSPITAL | Age: 86
DRG: 519 | End: 2021-10-23
Attending: HOSPITALIST
Payer: MEDICARE

## 2021-10-23 ENCOUNTER — APPOINTMENT (OUTPATIENT)
Dept: GENERAL RADIOLOGY | Facility: HOSPITAL | Age: 86
DRG: 519 | End: 2021-10-23
Attending: HOSPITALIST
Payer: MEDICARE

## 2021-10-23 ENCOUNTER — APPOINTMENT (OUTPATIENT)
Dept: NUCLEAR MEDICINE | Facility: HOSPITAL | Age: 86
DRG: 519 | End: 2021-10-23
Attending: HOSPITALIST
Payer: MEDICARE

## 2021-10-23 PROCEDURE — 93970 EXTREMITY STUDY: CPT | Performed by: HOSPITALIST

## 2021-10-23 PROCEDURE — 71046 X-RAY EXAM CHEST 2 VIEWS: CPT | Performed by: HOSPITALIST

## 2021-10-23 PROCEDURE — 78580 LUNG PERFUSION IMAGING: CPT | Performed by: HOSPITALIST

## 2021-10-23 PROCEDURE — 99232 SBSQ HOSP IP/OBS MODERATE 35: CPT | Performed by: HOSPITALIST

## 2021-10-23 PROCEDURE — 99024 POSTOP FOLLOW-UP VISIT: CPT | Performed by: PHYSICIAN ASSISTANT

## 2021-10-23 RX ORDER — ALBUTEROL SULFATE 2.5 MG/3ML
2.5 SOLUTION RESPIRATORY (INHALATION) EVERY 6 HOURS PRN
Status: DISCONTINUED | OUTPATIENT
Start: 2021-10-23 | End: 2021-10-25

## 2021-10-23 NOTE — PLAN OF CARE
Received patient up in chair, awake and oriented. On O2 at 2l, breath sounds diminished  with rales and occasional nonproductive cough. Back to bed with standby assist and walker. O2 increased to 3l as patient desatted when sleeping. No c/o pain voiced.  On

## 2021-10-23 NOTE — PROGRESS NOTES
[facility]     Hospitalist Progress Note     Tenisha Lang Patient Status:  Outpatient in a Bed    1935 MRN [id number]   [facility] 3SW-A Attending Osmany Donahue MD   Hosp Day # 2 PCP Reshma Shane MD     Chief Lab 10/23/21  0921   DDIMER 1.40*       Imaging: Imaging data reviewed in Epic.     Medications:   • cefTRIAXone  1 g Intravenous Q24H   • allopurinol  200 mg Oral Daily   • atorvastatin  20 mg Oral Nightly   • gabapentin  100 mg Oral BID   • metoprolol s

## 2021-10-23 NOTE — PROGRESS NOTES
[facility] Pulmonary/Critical Care Progress Note     SUBJECTIVE/Interval history: All events, procedures, notes reviewed. Pt developed hypoxia overnight while alseep and destaurated to 76% on 4 L.  Improved to require 2 Lab 10/22/21  0558   RBC 3.28*   HGB 11.2*   HCT 35.1   .0*   MCH 34.1*   MCHC 31.9   RDW 13.6   NEPRELIM 9.27*   WBC 12.0*   .0*     No results for input(s): BNP in the last 168 hours.   No results for input(s): TROP, CK in the last 168 babatunde mg Oral Daily   • atorvastatin  20 mg Oral Nightly   • gabapentin  100 mg Oral BID   • metoprolol succinate  12.5 mg Oral Daily   • sertraline  50 mg Oral Daily   • Senna  17.2 mg Oral Nightly   • Insulin Aspart Pen  1-10 Units Subcutaneous TID AC and HS

## 2021-10-23 NOTE — PHYSICAL THERAPY NOTE
PHYSICAL THERAPY TREATMENT NOTE - INPATIENT    Room Number: 378/378-A     Session: 2     Number of Visits to Meet Established Goals: 4    Presenting Problem: s/p R L4-L5 microdiscectomy 10/20/21    ASSESSMENT     Pt with improved ambulation distance, an Static Sitting: Fair  Dynamic Sitting: Fair -           Static Standing: Fair -  Dynamic Standing: Poor +    ACTIVITY TOLERANCE                         O2 WALK         A Therapist PPE: surgical mask, goggles and gloves during session    Patient/Family PPE: Mask at all times

## 2021-10-23 NOTE — PLAN OF CARE
Assumed care of pt @ 0730. Pt is A/Ox 4. On 2L of O2, VSS, SR on tele. IV saline locked  Tolerating diet. PT/OT recommending-ROSALINDA  Pt states pain is mild and tolerable no need for prn pain medicine  Up as tolerated with walker  Intake/outputs WNL. evaluate response  - Consider cultural and social influences on pain and pain management  - Manage/alleviate anxiety  - Utilize distraction and/or relaxation techniques  - Monitor for opioid side effects  - Notify MD/LIP if interventions unsuccessful or pa

## 2021-10-23 NOTE — PROGRESS NOTES
[facility]  Neurological Surgery Inpatient Progress Note    Tenisha Lang, 80year old female Patient Status:  Inpatient    1935 MRN [id number]   [facility] 3SW-A Attending Osmany Donahue MD   [facility] Day # 2 A

## 2021-10-23 NOTE — PROGRESS NOTES
[facility]  Neurosurgery Progress Note    Ahsan Ortiz Patient Status:  Outpatient in a Bed    1935 MRN [id number]   [facility] 3SW-A Attending Kain Velasquez MD   Hosp Day # 2 PCP MD Reynaldo Rodriguez

## 2021-10-24 PROCEDURE — 99232 SBSQ HOSP IP/OBS MODERATE 35: CPT | Performed by: HOSPITALIST

## 2021-10-24 NOTE — PROGRESS NOTES
[facility] Pulmonary/Critical Care Progress Note     SUBJECTIVE/Interval history: All events, procedures, notes reviewed.  Pt denies sob but also reports improved breathing ith nebs    Review of Systems:   A comprehensiv Recent Labs   Lab 10/22/21  0558   RBC 3.28*   HGB 11.2*   HCT 35.1   .0*   MCH 34.1*   MCHC 31.9   RDW 13.6   NEPRELIM 9.27*   WBC 12.0*   .0*     No results for input(s): BNP in the last 168 hours.   No results for input(s): TROP, CK in DO Cris on 10/23/2021 at 4:07 PM     Finalized by (CST): Hebert Ferreira DO on 10/23/2021 at 4:08 PM         • cefTRIAXone  1 g Intravenous Q24H   • allopurinol  200 mg Oral Daily   • atorvastatin  20 mg Oral Nightly   • gabapentin  100 mg Oral BID   • met

## 2021-10-24 NOTE — PLAN OF CARE
Assumed care of patient at 0700. Denies sob when at rest.   Back incision is dermabond, C/D/I  Attempted to wean o2 TO RA. Saturations maintained 86-88% consistently. Currently on 2 liters. IS encouraged. Up in chair and ambulating. AVAPS at [facility]. anxiety  - Utilize distraction and/or relaxation techniques  - Monitor for opioid side effects  - Notify MD/LIP if interventions unsuccessful or patient reports new pain  - Anticipate increased pain with activity and pre-medicate as appropriate  Outcome: P

## 2021-10-24 NOTE — PROGRESS NOTES
Received patient on 3LNC, tolerating well. Per Cecilia CARSON, ABG was drawn at 2030 (see results in Epic), then patient placed on AVAPS for bedtime around 2200. Patient tolerated AVAPS well overnight, resting comfortably. ABG to be drawn in AM per Pulm.

## 2021-10-24 NOTE — PROGRESS NOTES
[facility]  Neurological Surgery Inpatient Progress Note    Davina Hurtado, 80year old female Patient Status:  Inpatient    1935 MRN [id number]   [facility] 3SW-A Attending Vikki Fowler MD   [facility] Day # 3 A

## 2021-10-24 NOTE — PLAN OF CARE
Assumed care of patient at [address] [place] [address]. Monitor on telemetry-NSR, continuous pulse ox on nasal cannula. Placed on AVAPS at bedtime. Encourage use flutter/incentive spirometer-patient educated and demonstrates proper use.  Incision clean/dry/intact back, denies any

## 2021-10-24 NOTE — PLAN OF CARE
Assumed care of pt @ 0730. Pt is A/Ox 4. On 2-3L of O2, VSS, SR on tele. IV saline locked  Tolerating  diet. PT/OT recommending ROSALINDA  Pt states pian is very tolerable, denies need for pain meds  Up as tolerated  Intake/outputs WNL.   Pt desat to 76% th

## 2021-10-24 NOTE — PROGRESS NOTES
[facility]     Hospitalist Progress Note     Arlette Celis Patient Status:  Outpatient in a Bed    1935 MRN [id number]   [facility] 3SW-A Attending John Noguera MD   Hosp Day # 3 PCP Elida Resendiz MD     Chief Epic.    Medications:   • cefTRIAXone  1 g Intravenous Q24H   • allopurinol  200 mg Oral Daily   • atorvastatin  20 mg Oral Nightly   • gabapentin  100 mg Oral BID   • metoprolol succinate  12.5 mg Oral Daily   • sertraline  50 mg Oral Daily   • Senna  17.

## 2021-10-25 VITALS
BODY MASS INDEX: 28.24 KG/M2 | DIASTOLIC BLOOD PRESSURE: 61 MMHG | HEIGHT: 64 IN | SYSTOLIC BLOOD PRESSURE: 120 MMHG | TEMPERATURE: 98 F | RESPIRATION RATE: 17 BRPM | HEART RATE: 70 BPM | OXYGEN SATURATION: 90 % | WEIGHT: 165.38 LBS

## 2021-10-25 PROCEDURE — 99232 SBSQ HOSP IP/OBS MODERATE 35: CPT | Performed by: HOSPITALIST

## 2021-10-25 NOTE — CM/SW NOTE
Insurance auth received. Patient will discharge at [place] [address] via [address] to [employer] at [facility] - phone # [phone]  . PCS form completed. RN will provide bipap settings when report given to facility.     Katarina Hines LCSW

## 2021-10-25 NOTE — PROGRESS NOTES
[facility]     Hospitalist Progress Note     Isabella Guzman Patient Status:  Outpatient in a Bed    1935 MRN [id number]   [facility] 3SW-A Attending Theresa Moe MD   Hosp Day # 4 PCP Hayley Maradiaga MD     Chief cefTRIAXone  1 g Intravenous Q24H   • allopurinol  200 mg Oral Daily   • atorvastatin  20 mg Oral Nightly   • gabapentin  100 mg Oral BID   • metoprolol succinate  12.5 mg Oral Daily   • sertraline  50 mg Oral Daily   • Senna  17.2 mg Oral Nightly   • Insu

## 2021-10-25 NOTE — PROCEDURES
Spirometry Findings:  FEV1 is 2.28L, 127% predicted. FVC is 2.85L, 119% predicted. FEV1/ FVC ratio is 0.80. The flow-volume loop demonstrates a normal pattern. There is no significant bronchodilator response after   administration of albuterol.      Imp

## 2021-10-25 NOTE — PROGRESS NOTES
NURSING DISCHARGE NOTE    Discharged Rehab facility via Ambulance. Accompanied by Support staff  Belongings Taken by patient/family. Patient assessed and ready for discharge. IV dc'd. C/D/I.  Discharge instructions and follow up gone over with RN Se

## 2021-10-25 NOTE — PROGRESS NOTES
[facility] Pulmonary/Critical Care Progress Note     SUBJECTIVE/Interval history:  No acute events overnight, she feels 'great' today, denies any dyspnea or pain. Does note occasional cough with white phlegm. No fevers. ABGPHT 7.39   HKDUZL3D 38   ORPCS1X 62*   ABGHCO3 22.3   ABGBE -2.3*   TEMP 98.2   BRANDYN Positive   SITE Left Radial   DEV Nasal cannula   THGB 12.1     Recent Labs   Lab 10/23/21  0921   DDIMER 1.40*       Other Labs:     Interval Culture Data:   No resu morphINE sulfate, glucose **OR** glucose-vitamin C **OR** dextrose **OR** glucose **OR** glucose-vitamin C    ASSESSMENT  · Status post L4-5 microdiscectomy  · Postop hypoxia etiology not entirely clear rule out evolving left lower lobe pneumonia rule out

## 2021-10-25 NOTE — CM/SW NOTE
[facility] [employer] - phone # [phone]  Does not have auth from [facility] yet for discharge. SW will continue to follow.     Ella Estrada LCSW

## 2021-10-25 NOTE — PLAN OF CARE
Assumed care of patient at 1900. Monitor on telemetry-NSR, received on O2 2L increased to 4L maintain 89%, encourage incentive spirometer/flutter, demonstrated use. PRN neb treatment. AVAPS at night. Denies any pain. Up with assist and walker.  Fall precaut

## 2021-10-25 NOTE — OCCUPATIONAL THERAPY NOTE
OCCUPATIONAL THERAPY TREATMENT NOTE - INPATIENT     Room Number: 378/378-A  Session: 1   Number of Visits to Meet Established Goals: 5    Patient is a 80year old female admitted on 10/20/2021 with Presenting Problem: L4-5 microdiscectomy.   Pt was admitted guiding pants over her legs  Toileting: CGA dynamic balance for clothing management, independent hygiene care    Functional Mobility:  Toilet/commode transfer: supervision to stand    Activity tolerance: 3 liters, 96% during the session    Education provid Functional Transfer Goals  Patient will transfer to toilet:  with supervision     Additional Goals:  Recall spine precautions. PPE worn by this OT: goggles, surgical mask and gloves.

## 2021-10-26 ENCOUNTER — NURSE ONLY (OUTPATIENT)
Dept: LAB | Age: 86
End: 2021-10-26
Attending: FAMILY MEDICINE
Payer: MEDICARE

## 2021-10-26 ENCOUNTER — EXTERNAL FACILITY (OUTPATIENT)
Dept: FAMILY MEDICINE CLINIC | Facility: CLINIC | Age: 86
End: 2021-10-26

## 2021-10-26 DIAGNOSIS — I35.0 AORTIC STENOSIS, MILD: ICD-10-CM

## 2021-10-26 DIAGNOSIS — M51.36 DDD (DEGENERATIVE DISC DISEASE), LUMBAR: ICD-10-CM

## 2021-10-26 DIAGNOSIS — E11.9 TYPE 2 DIABETES MELLITUS WITHOUT COMPLICATION, WITHOUT LONG-TERM CURRENT USE OF INSULIN (HCC): ICD-10-CM

## 2021-10-26 DIAGNOSIS — I77.9 BILATERAL CAROTID ARTERY DISEASE, UNSPECIFIED TYPE (HCC): ICD-10-CM

## 2021-10-26 DIAGNOSIS — M54.16 LUMBAR RADICULOPATHY: ICD-10-CM

## 2021-10-26 DIAGNOSIS — Z86.73 HISTORY OF STROKE: ICD-10-CM

## 2021-10-26 DIAGNOSIS — N25.81 SECONDARY HYPERPARATHYROIDISM (HCC): ICD-10-CM

## 2021-10-26 DIAGNOSIS — I10 ESSENTIAL HYPERTENSION: ICD-10-CM

## 2021-10-26 DIAGNOSIS — F33.0 MILD RECURRENT MAJOR DEPRESSION (HCC): Chronic | ICD-10-CM

## 2021-10-26 DIAGNOSIS — E21.0 HYPERPARATHYROIDISM, PRIMARY (HCC): ICD-10-CM

## 2021-10-26 DIAGNOSIS — Z11.59 SCREENING EXAMINATION FOR POLIOMYELITIS: Primary | ICD-10-CM

## 2021-10-26 DIAGNOSIS — N18.4 CKD (CHRONIC KIDNEY DISEASE) STAGE 4, GFR 15-29 ML/MIN (HCC): Chronic | ICD-10-CM

## 2021-10-26 DIAGNOSIS — M47.816 ARTHRITIS OF FACET JOINT OF LUMBAR SPINE: ICD-10-CM

## 2021-10-26 DIAGNOSIS — Z98.890 S/P LUMBAR MICRODISCECTOMY: Primary | ICD-10-CM

## 2021-10-26 PROCEDURE — 99306 1ST NF CARE HIGH MDM 50: CPT | Performed by: FAMILY MEDICINE

## 2021-10-26 NOTE — DISCHARGE SUMMARY
[facility]  Discharge Summary    Loree Khanna Patient Status:  Inpatient    1935 MRN [id number]   [facility] 3SW-A Attending No att. providers found   Hosp Day # 4 PCP Livier Salazar MD     Date of Admission: 10/20/202 numbness/weakness.  She went to rehab and had a full recovery after 6 months.  She takes ASA daily.       Brief Summary of Hospital Course: Pt presented for procedure as planned. She was admitted to the ortho spine floor post op.   On POD #1, she had clear Normal, Disp-100 each, R-3EE11.51, I70.0, non-insulin dependent, ONETOUCH DELICA PLUS LANCET 19E    Sertraline HCl 50 MG Oral Tab  Take 1 tablet (50 mg total) by mouth daily. , Normal, Disp-90 tablet, R-3      STOP taking these medications    aspirin 325 MG

## 2021-10-27 ENCOUNTER — INITIAL APN SNF VISIT (OUTPATIENT)
Dept: INTERNAL MEDICINE CLINIC | Age: 86
End: 2021-10-27

## 2021-10-27 VITALS
DIASTOLIC BLOOD PRESSURE: 56 MMHG | OXYGEN SATURATION: 92 % | SYSTOLIC BLOOD PRESSURE: 103 MMHG | TEMPERATURE: 97 F | WEIGHT: 166.81 LBS | HEART RATE: 73 BPM | BODY MASS INDEX: 28 KG/M2 | RESPIRATION RATE: 18 BRPM

## 2021-10-27 DIAGNOSIS — I10 ESSENTIAL HYPERTENSION: ICD-10-CM

## 2021-10-27 DIAGNOSIS — N18.4 CKD (CHRONIC KIDNEY DISEASE) STAGE 4, GFR 15-29 ML/MIN (HCC): ICD-10-CM

## 2021-10-27 DIAGNOSIS — E11.42 DIABETIC POLYNEUROPATHY ASSOCIATED WITH TYPE 2 DIABETES MELLITUS (HCC): ICD-10-CM

## 2021-10-27 DIAGNOSIS — I77.9 BILATERAL CAROTID ARTERY DISEASE, UNSPECIFIED TYPE (HCC): ICD-10-CM

## 2021-10-27 DIAGNOSIS — M10.9 GOUT, UNSPECIFIED CAUSE, UNSPECIFIED CHRONICITY, UNSPECIFIED SITE: ICD-10-CM

## 2021-10-27 DIAGNOSIS — Z98.890 S/P LUMBAR MICRODISCECTOMY: Primary | ICD-10-CM

## 2021-10-27 DIAGNOSIS — R53.1 WEAKNESS: ICD-10-CM

## 2021-10-27 DIAGNOSIS — E11.9 TYPE 2 DIABETES MELLITUS WITHOUT COMPLICATION, WITHOUT LONG-TERM CURRENT USE OF INSULIN (HCC): ICD-10-CM

## 2021-10-27 DIAGNOSIS — Z86.73 HISTORY OF STROKE: ICD-10-CM

## 2021-10-27 DIAGNOSIS — F33.0 MILD RECURRENT MAJOR DEPRESSION (HCC): ICD-10-CM

## 2021-10-27 DIAGNOSIS — J96.01 ACUTE RESPIRATORY FAILURE WITH HYPOXIA (HCC): ICD-10-CM

## 2021-10-27 PROCEDURE — 3074F SYST BP LT 130 MM HG: CPT | Performed by: NURSE PRACTITIONER

## 2021-10-27 PROCEDURE — 99310 SBSQ NF CARE HIGH MDM 45: CPT | Performed by: NURSE PRACTITIONER

## 2021-10-27 PROCEDURE — 1124F ACP DISCUSS-NO DSCNMKR DOCD: CPT | Performed by: NURSE PRACTITIONER

## 2021-10-27 PROCEDURE — 3078F DIAST BP <80 MM HG: CPT | Performed by: NURSE PRACTITIONER

## 2021-10-27 PROCEDURE — 1111F DSCHRG MED/CURRENT MED MERGE: CPT | Performed by: NURSE PRACTITIONER

## 2021-10-27 NOTE — PROGRESS NOTES
Thomas Castro  : 1935  Age 80year old  female patient is admitted to Facility: The [address] [place] [address] for subacute rehab.     [address] [place] [address] Drive date:  10/20/21  Discharge date to [facility]:  10/25/21  ELOS:  10-14 days  Anticipated dis asymptomatic, it may be an indication for surgery; however, due to her diabetes, age, and renal insufficiency, most vascular surgeons would continue to observe. I probably might not do any intervention until she has a symptom from the carotid artery.   I a History    Tobacco Use      Smoking status: Former Smoker        Quit date: 1998        Years since quittin.4      Smokeless tobacco: Never Used    Vaping Use      Vaping Use: Never used    Alcohol use: No    Drug use: No      ALLERGIES:    Flu Vi or deficits  HENT: denies nasal congestion, sinus pain or sore throat; and hearing loss negative  RESPIRATORY: ---no wheezing, no cough, shortness of breath with exertion, no chest pain  CARDIOVASCULAR:denies chest pain, no palpitations , denies syncope, d [  ]  Sepsis  [  ]  COPD/Pneumonia                                          [  ]  Angina                                    [  ]  Renal Failure  [  ]  Internal cardiac defibrillator implant                [  ]  GI bleed 127.0 (L) 10/22/2021     Lab Results   Component Value Date     (H) 10/22/2021    BUN 52 (H) 10/22/2021    BUNCREA 23.5 (H) 06/28/2021    CREATSERUM 1.59 (H) 10/22/2021    ANIONGAP 6 10/22/2021    GFR 28 (L) 04/05/2017    GFRNAA 29 (L) 10/22/2021 and prn    Depression/anxiety  Sertraline 50 mg p.o. 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

## 2021-10-28 ENCOUNTER — NURSE ONLY (OUTPATIENT)
Dept: LAB | Age: 86
End: 2021-10-28
Attending: NURSE PRACTITIONER
Payer: MEDICARE

## 2021-10-28 DIAGNOSIS — I10 ESSENTIAL HYPERTENSION: Primary | ICD-10-CM

## 2021-10-28 DIAGNOSIS — Z86.73 HISTORY OF STROKE: ICD-10-CM

## 2021-10-28 PROCEDURE — 80048 BASIC METABOLIC PNL TOTAL CA: CPT

## 2021-10-28 PROCEDURE — 85025 COMPLETE CBC W/AUTO DIFF WBC: CPT

## 2021-10-28 NOTE — PAYOR COMM NOTE
--------------  CONTINUED STAY REVIEW    Payor: Chaim Michelle #:  [id number]  Authorization Number: [id number] / Angely Umanzor #[id number]    Admit date: 10/21/21  Admit time:  3:25 PM    Admitting Physician: Dominick Garcia MD  Attending Dexter plan to treat for possible flare.  On ceftriaxone  · We will check bedside spirometry trial nebulized bronchodilators  · followup pulm clinic with Dr. Fifi Talamantes  · Proph: scd  · D/w raven Trejo MD   10/22/2021  4:54 PM     10/23/2021:  Pulmonary/Critical 10/24/21  0730   ABGPHT 7.39   XRAYCJ9H 38   AJBCZ8L 62*   ABGHCO3 22.3   ABGBE -2.3*   TEMP 98.2   BRANDYN Positive   SITE Left Radial   DEV Nasal cannula   THGB 12.1     XR CHEST PA + LAT CHEST (SZX=44222)  Result Date: 10/23/2021  CONCLUSION:  1.  Hyperexp 0047-Given     0835-Given     1110-D/C'd        allopurinol (ZYLOPRIM) tab 200 mg  Dose: 200 mg  Freq: Daily Route: OR  Start: 10/20/21 1730 End: 10/25/21 1943       (1730)-Not Given         0932-Given         0939-Given         1020-Given         0846-G fluticasone furoate-vilanterol (BREO ELLIPTA) 200-25 MCG/INH inhaler 1 puff  Dose: 1 puff  Freq: Daily Route: IN  Start: 10/25/21 1030 End: 10/25/21 1943   Admin Instructions:   Administer at the same time every day; do not use more than one inhalation i Admin Instructions:   Do not crush       (4301)-Not Given         0933-Given         0939-Given         1020-Given         0846-Given         0927-Given     1943-D/C'd      Senna (SENOKOT) tab 17.2 mg  Dose: 17.2 mg  Freq: Nightly Route: OR  Start: 10/20 1441-D/C'd           microfibrillar collagen (AVITENE) powder  Freq: As needed  Start: 10/20/21 1404 End: 10/20/21 1441       1404-Given     1441-D/C'd           morphINE Sulfate (PF) (DURAMORPH) 0.5 MG/ML injection  Freq: As needed  Start: 10/20/21 1404 None (Room air) 3 L/min MT    10/24/21 0845 — — — — 88 % — Nasal cannula 4 L/min MT    10/24/21 0749 98.3 °F (36.8 °C) 69 18 143/57 87 % — CPAP — CF    10/24/21 0330 98.2 °F (36.8 °C) 72 18 136/69 95 % — CPAP — MG       FOR CONTINUED REVIEW/APPROVAL OF INP

## 2021-10-29 ENCOUNTER — SNF VISIT (OUTPATIENT)
Dept: INTERNAL MEDICINE CLINIC | Age: 86
End: 2021-10-29

## 2021-10-29 VITALS
RESPIRATION RATE: 18 BRPM | HEART RATE: 71 BPM | TEMPERATURE: 99 F | SYSTOLIC BLOOD PRESSURE: 99 MMHG | OXYGEN SATURATION: 93 % | DIASTOLIC BLOOD PRESSURE: 55 MMHG

## 2021-10-29 DIAGNOSIS — R09.02 HYPOXIA: ICD-10-CM

## 2021-10-29 DIAGNOSIS — M54.16 LUMBAR RADICULOPATHY: Primary | ICD-10-CM

## 2021-10-29 DIAGNOSIS — M51.36 DDD (DEGENERATIVE DISC DISEASE), LUMBAR: ICD-10-CM

## 2021-10-29 DIAGNOSIS — Z98.890 S/P LUMBAR MICRODISCECTOMY: ICD-10-CM

## 2021-10-29 PROCEDURE — 3074F SYST BP LT 130 MM HG: CPT | Performed by: NURSE PRACTITIONER

## 2021-10-29 PROCEDURE — 3078F DIAST BP <80 MM HG: CPT | Performed by: NURSE PRACTITIONER

## 2021-10-29 PROCEDURE — 99308 SBSQ NF CARE LOW MDM 20: CPT | Performed by: NURSE PRACTITIONER

## 2021-10-29 NOTE — PROGRESS NOTES
Michelle De Leon, 96/1935, 80year old, female    Chief Complaint:  Patient presents with:   Follow - Up: S/P microdiscectomy and post op hypoxia       Subjective:   PMH significant for CVA 20 yrs ago, Depression, Mild aortic stenosis, HTN, HL, T2 DM w/ C dorsalis pedal pulses 2+  NEUROLOGIC: A&OX3, no focal deficits, follows commands  PSYCHIATRIC: A&O x 3; calm, cooperative, mood and affect appropriate to situation    Medications reviewed: Yes    Diagnostics reviewed:    Lab Results   Component Value Date 11-->11.9  No additional blood loss noted next monitoring vital signs every shift and as needed     Gout  Allopurinol 200 mg daily     Diabetes mellitus with polyneuropathy  Glimepiride 1 mg p.o. every morning  Metformin 1 mg p.o. twice daily  Gabapentin 2

## 2021-10-31 NOTE — PROGRESS NOTES
[address] Author: Luis Montoya MD     1935 MRN [id number]   [facility]  Admission 10/20/21      Last Hospital Discharge 10/25/21 PCP Teo Cruz 15 of Discharge 10/25/21       Date of A [facility] PLUS [id number]) Does not apply Misc, 1 lancet by Finger stick route as needed. AT LEAST DAILY  Sertraline HCl 50 MG Oral Tab, Take 1 tablet (50 mg total) by mouth daily.     No current facility-administered medications on file prior to visi was completed.     Pertinent positives and negatives noted in the HPI. PHYSICAL EXAM:   Estimated body mass index is 28.13 kg/m² as calculated from the following:    Height as of 10/4/21: 5' 4.57\" (1.64 m).     Weight as of 10/27/21: 166 lb 12.8 oz (7 person, place, and time. Cranial Nerves: No cranial nerve deficit. Motor: No abnormal muscle tone. Coordination: Coordination normal.      Deep Tendon Reflexes: Reflexes are normal and symmetric.  Reflexes normal.   Psychiatric:         Behav density extends along the left lower lobe medially most consistent with atelectasis and or scar. Mild bilateral lower lobe bronchiectasis.   VASCULATURE:  Thrombus cannot be excluded without intravenous contrast.  Incidental note of a right aberrant subcla None visible. COMPRESSION:  Normal compressibility, phasicity, and augmentation. CONCLUSION:  No DVT in either leg.     Dictated by (CST): Earnestine Gallagher MD on 10/23/2021 at 6:33 PM     Finalized by (CST): Earnestine Gallagher MD on 10/23/2021 at 6:34 Berta Guerrero MD on 10/20/2021 at 9:37 PM       XR FLUOROSCOPY C-ARM TIME <1 HOUR  (CPT=76000)    Result Date: 10/20/2021  PROCEDURE:  XR FLUOROSCOPY C-ARM TIME <1 HOUR  (CPT=76000)  INDICATIONS:  OR  COMPARISON:  None.    TECHNIQUE:  FLUOROSCOPY IMAGES OBTAINED

## 2021-11-01 ENCOUNTER — SNF VISIT (OUTPATIENT)
Dept: INTERNAL MEDICINE CLINIC | Age: 86
End: 2021-11-01

## 2021-11-01 VITALS
RESPIRATION RATE: 18 BRPM | SYSTOLIC BLOOD PRESSURE: 138 MMHG | TEMPERATURE: 97 F | DIASTOLIC BLOOD PRESSURE: 61 MMHG | OXYGEN SATURATION: 94 % | HEART RATE: 73 BPM

## 2021-11-01 DIAGNOSIS — I10 ESSENTIAL HYPERTENSION: ICD-10-CM

## 2021-11-01 DIAGNOSIS — M54.16 LUMBAR RADICULOPATHY: Primary | ICD-10-CM

## 2021-11-01 DIAGNOSIS — R09.02 HYPOXIA: ICD-10-CM

## 2021-11-01 DIAGNOSIS — Z98.890 S/P LUMBAR MICRODISCECTOMY: ICD-10-CM

## 2021-11-01 DIAGNOSIS — R53.1 WEAKNESS: ICD-10-CM

## 2021-11-01 DIAGNOSIS — N18.4 CKD (CHRONIC KIDNEY DISEASE) STAGE 4, GFR 15-29 ML/MIN (HCC): ICD-10-CM

## 2021-11-01 DIAGNOSIS — E11.9 TYPE 2 DIABETES MELLITUS WITHOUT COMPLICATION, WITHOUT LONG-TERM CURRENT USE OF INSULIN (HCC): ICD-10-CM

## 2021-11-01 PROCEDURE — 99309 SBSQ NF CARE MODERATE MDM 30: CPT | Performed by: NURSE PRACTITIONER

## 2021-11-01 PROCEDURE — 3075F SYST BP GE 130 - 139MM HG: CPT | Performed by: NURSE PRACTITIONER

## 2021-11-01 PROCEDURE — 3078F DIAST BP <80 MM HG: CPT | Performed by: NURSE PRACTITIONER

## 2021-11-01 NOTE — CDS QUERY
Uncertain Diagnosis  [facility]  Dear Dr. Christina Alvarenga,  Clinical information (provided below) indicates a documented diagnosis of pneumonia.  For accurate ICD-10-CM code assignment,   PLEASE clarify the documented diagnosis of pne

## 2021-11-01 NOTE — PROGRESS NOTES
Paul Ramos, 96/1935, 80year old, female    Chief Complaint:  Patient presents with:   Follow - Up: s/p microdiscectomy, hypoxia, weakness  Weakness       Subjective:   PMH significant for CVA 20 yrs ago, Depression, Mild aortic stenosis, HTN, HL, T 2+  NEUROLOGIC: A&OX3, no focal deficits, follows commands  PSYCHIATRIC: calm, cooperative, mood and affect appropriate to situation    Therapy  Pending updates    Medications reviewed: Yes    Diagnostics reviewed:    Lab Results   Component Value Date 11-->11.9  No additional blood loss noted next monitoring vital signs every shift and as needed     Gout  Allopurinol 200 mg daily     Diabetes mellitus with polyneuropathy  Glimepiride 1 mg p.o. every morning  Metformin 1 mg p.o. twice daily  Gabapentin 2

## 2021-11-02 ENCOUNTER — EXTERNAL FACILITY (OUTPATIENT)
Dept: FAMILY MEDICINE CLINIC | Facility: CLINIC | Age: 86
End: 2021-11-02

## 2021-11-02 DIAGNOSIS — G47.33 OSA (OBSTRUCTIVE SLEEP APNEA): ICD-10-CM

## 2021-11-02 DIAGNOSIS — M47.816 ARTHRITIS OF FACET JOINT OF LUMBAR SPINE: ICD-10-CM

## 2021-11-02 DIAGNOSIS — Z98.890 S/P LUMBAR MICRODISCECTOMY: Primary | ICD-10-CM

## 2021-11-02 DIAGNOSIS — I77.9 BILATERAL CAROTID ARTERY DISEASE, UNSPECIFIED TYPE (HCC): ICD-10-CM

## 2021-11-02 DIAGNOSIS — E11.42 DIABETIC POLYNEUROPATHY ASSOCIATED WITH TYPE 2 DIABETES MELLITUS (HCC): ICD-10-CM

## 2021-11-02 DIAGNOSIS — N18.4 CKD (CHRONIC KIDNEY DISEASE) STAGE 4, GFR 15-29 ML/MIN (HCC): Chronic | ICD-10-CM

## 2021-11-02 DIAGNOSIS — M51.36 DDD (DEGENERATIVE DISC DISEASE), LUMBAR: ICD-10-CM

## 2021-11-02 DIAGNOSIS — J47.1 BRONCHIECTASIS WITH ACUTE EXACERBATION (HCC): ICD-10-CM

## 2021-11-02 DIAGNOSIS — J43.9 PULMONARY EMPHYSEMA, UNSPECIFIED EMPHYSEMA TYPE (HCC): ICD-10-CM

## 2021-11-02 DIAGNOSIS — E11.8 DIABETES MELLITUS TYPE 2 WITH COMPLICATIONS (HCC): ICD-10-CM

## 2021-11-02 DIAGNOSIS — I10 ESSENTIAL HYPERTENSION: ICD-10-CM

## 2021-11-02 DIAGNOSIS — F33.0 MILD RECURRENT MAJOR DEPRESSION (HCC): Chronic | ICD-10-CM

## 2021-11-02 DIAGNOSIS — Z86.73 HISTORY OF STROKE: ICD-10-CM

## 2021-11-02 DIAGNOSIS — M54.16 LUMBAR RADICULOPATHY: ICD-10-CM

## 2021-11-02 PROCEDURE — 99308 SBSQ NF CARE LOW MDM 20: CPT | Performed by: FAMILY MEDICINE

## 2021-11-03 ENCOUNTER — SNF VISIT (OUTPATIENT)
Dept: INTERNAL MEDICINE CLINIC | Age: 86
End: 2021-11-03

## 2021-11-03 ENCOUNTER — MED REC SCAN ONLY (OUTPATIENT)
Dept: FAMILY MEDICINE CLINIC | Facility: CLINIC | Age: 86
End: 2021-11-03

## 2021-11-03 VITALS
RESPIRATION RATE: 18 BRPM | OXYGEN SATURATION: 92 % | DIASTOLIC BLOOD PRESSURE: 77 MMHG | TEMPERATURE: 97 F | HEART RATE: 67 BPM | WEIGHT: 167.38 LBS | SYSTOLIC BLOOD PRESSURE: 126 MMHG | BODY MASS INDEX: 28 KG/M2

## 2021-11-03 DIAGNOSIS — I10 ESSENTIAL HYPERTENSION: ICD-10-CM

## 2021-11-03 DIAGNOSIS — N18.4 CKD (CHRONIC KIDNEY DISEASE) STAGE 4, GFR 15-29 ML/MIN (HCC): ICD-10-CM

## 2021-11-03 DIAGNOSIS — E11.9 TYPE 2 DIABETES MELLITUS WITHOUT COMPLICATION, WITHOUT LONG-TERM CURRENT USE OF INSULIN (HCC): ICD-10-CM

## 2021-11-03 DIAGNOSIS — R09.02 HYPOXIA: ICD-10-CM

## 2021-11-03 DIAGNOSIS — Z98.890 S/P LUMBAR MICRODISCECTOMY: Primary | ICD-10-CM

## 2021-11-03 DIAGNOSIS — Z78.9 DECREASED ACTIVITIES OF DAILY LIVING (ADL): ICD-10-CM

## 2021-11-03 PROCEDURE — 99309 SBSQ NF CARE MODERATE MDM 30: CPT | Performed by: NURSE PRACTITIONER

## 2021-11-03 PROCEDURE — 3074F SYST BP LT 130 MM HG: CPT | Performed by: NURSE PRACTITIONER

## 2021-11-03 PROCEDURE — 3078F DIAST BP <80 MM HG: CPT | Performed by: NURSE PRACTITIONER

## 2021-11-03 NOTE — PROGRESS NOTES
Rosalba Greenwood, 96/1935, 80year old, female    Chief Complaint:  Patient presents with:   Follow - Up: s/p lumbar microdiscectomy, hypoxia  Lab Results  Weakness       Subjective:   PMH significant for CVA 20 yrs ago, Depression, Mild aortic stenosis, lymphedema  MUSCULOSKELETAL: no acute synovitis upper or lower extremity  EXTREMITIES/VASCULAR:radial pulses 2+ and dorsalis pedal pulses 2+  NEUROLOGIC: A&OX3, no focal deficits, follows commands  PSYCHIATRIC: calm, cooperative, mood and affect appropriat pulmonary function tests  Continue to monitor respiratory status  May need to be qualified for home O2 if unable to wean     Mild leukocytosis  Monitoring  No fever chills nausea vomiting diarrhea  Incentive spirometer for prophylaxis of atelectasis     Anderson Alvaro Carcamo APRN  11/3/21 3:23 PM

## 2021-11-04 ENCOUNTER — OFFICE VISIT (OUTPATIENT)
Dept: SURGERY | Facility: CLINIC | Age: 86
End: 2021-11-04
Payer: MEDICARE

## 2021-11-04 ENCOUNTER — NURSE ONLY (OUTPATIENT)
Dept: LAB | Age: 86
End: 2021-11-04
Attending: FAMILY MEDICINE
Payer: MEDICARE

## 2021-11-04 VITALS
BODY MASS INDEX: 28.17 KG/M2 | WEIGHT: 165 LBS | DIASTOLIC BLOOD PRESSURE: 68 MMHG | HEIGHT: 64 IN | SYSTOLIC BLOOD PRESSURE: 116 MMHG

## 2021-11-04 DIAGNOSIS — I10 ESSENTIAL HYPERTENSION: Primary | ICD-10-CM

## 2021-11-04 DIAGNOSIS — M54.16 LUMBAR RADICULOPATHY: Primary | ICD-10-CM

## 2021-11-04 PROCEDURE — 3074F SYST BP LT 130 MM HG: CPT | Performed by: PHYSICIAN ASSISTANT

## 2021-11-04 PROCEDURE — 80053 COMPREHEN METABOLIC PANEL: CPT

## 2021-11-04 PROCEDURE — 85025 COMPLETE CBC W/AUTO DIFF WBC: CPT

## 2021-11-04 PROCEDURE — 99024 POSTOP FOLLOW-UP VISIT: CPT | Performed by: PHYSICIAN ASSISTANT

## 2021-11-04 PROCEDURE — 3008F BODY MASS INDEX DOCD: CPT | Performed by: PHYSICIAN ASSISTANT

## 2021-11-04 PROCEDURE — 3078F DIAST BP <80 MM HG: CPT | Performed by: PHYSICIAN ASSISTANT

## 2021-11-04 NOTE — PROGRESS NOTES
Feeling well  Every once in awhile will have hip pain and right ankle pain  Feeling much better after sx    No concerns

## 2021-11-04 NOTE — PROGRESS NOTES
Neurosurgery Clinic Visit  2021    Kavin Tomas PCP:  Franchesca Naqvi MD    1935 MRN [id number]     CC:  S/P R L4-5 discectomy 10/20/21 Dr. Danica Badillo     HPI:  Cal Louis is here for 2 week post op appt. She feels great.   Her right le  There is ossification near hamstring tendon origins at the ischial tuberosities bilaterally.              Past Medical History:   Diagnosis Date   • Acute, but ill-defined, cerebrovascular disease 1998   • Aortic stenosis, mild 10/29/2019   • Arthritis   use: No    Other Topics      Concerns:               Family History   Problem Relation Age of Onset   • Diabetes Mother 80         vascular disease   • Other (ruptured appendix) Father     • Other (vascular disease) Sister     • Cancer Brother           ranjith

## 2021-11-10 ENCOUNTER — TELEPHONE (OUTPATIENT)
Dept: INTERNAL MEDICINE CLINIC | Facility: CLINIC | Age: 86
End: 2021-11-10

## 2021-11-10 ENCOUNTER — SNF DISCHARGE (OUTPATIENT)
Dept: INTERNAL MEDICINE CLINIC | Age: 86
End: 2021-11-10

## 2021-11-10 VITALS
SYSTOLIC BLOOD PRESSURE: 124 MMHG | RESPIRATION RATE: 18 BRPM | BODY MASS INDEX: 29 KG/M2 | OXYGEN SATURATION: 93 % | WEIGHT: 167.38 LBS | HEART RATE: 74 BPM | DIASTOLIC BLOOD PRESSURE: 67 MMHG | TEMPERATURE: 98 F

## 2021-11-10 DIAGNOSIS — R53.1 WEAKNESS: ICD-10-CM

## 2021-11-10 DIAGNOSIS — J96.11 CHRONIC RESPIRATORY FAILURE WITH HYPOXIA (HCC): Primary | ICD-10-CM

## 2021-11-10 DIAGNOSIS — Z98.890 S/P LUMBAR MICRODISCECTOMY: ICD-10-CM

## 2021-11-10 DIAGNOSIS — E11.42 DIABETIC POLYNEUROPATHY ASSOCIATED WITH TYPE 2 DIABETES MELLITUS (HCC): ICD-10-CM

## 2021-11-10 DIAGNOSIS — I10 ESSENTIAL HYPERTENSION: ICD-10-CM

## 2021-11-10 DIAGNOSIS — G47.33 OSA (OBSTRUCTIVE SLEEP APNEA): Primary | ICD-10-CM

## 2021-11-10 DIAGNOSIS — Z78.9 DECREASED ACTIVITIES OF DAILY LIVING (ADL): ICD-10-CM

## 2021-11-10 DIAGNOSIS — N18.4 CKD (CHRONIC KIDNEY DISEASE) STAGE 4, GFR 15-29 ML/MIN (HCC): ICD-10-CM

## 2021-11-10 DIAGNOSIS — E11.9 TYPE 2 DIABETES MELLITUS WITHOUT COMPLICATION, WITHOUT LONG-TERM CURRENT USE OF INSULIN (HCC): ICD-10-CM

## 2021-11-10 PROCEDURE — 3078F DIAST BP <80 MM HG: CPT | Performed by: NURSE PRACTITIONER

## 2021-11-10 PROCEDURE — 3074F SYST BP LT 130 MM HG: CPT | Performed by: NURSE PRACTITIONER

## 2021-11-10 PROCEDURE — 99316 NF DSCHRG MGMT 30 MIN+: CPT | Performed by: NURSE PRACTITIONER

## 2021-11-10 NOTE — TELEPHONE ENCOUNTER
Provider's Name?:  Dr. Griselda Bales?:  [address] [facility]   Reason for Visit?:  COBD and Sleep Apnea   Diagnosis?:    Number of Visits Requested?:  3  Last Visit with Specialist?:  None  Is Appt.  Already Scheduled?:

## 2021-11-10 NOTE — TELEPHONE ENCOUNTER
To call pt back. Both Dr Azalia Lomax and Alicia Andre are at [facility]. Pt is scheduled with both drs. Spoke with pt. advised eye exam due. BMP due end of month to re-eval glucose. Dr Yael Goncalves please review recent CMP.  Pt asking if you still want her to r (!) 88 %.      Intake/Output:     Intake/Output Summary (Last 24 hours) at 10/24/2021 0958  Last data filed at 10/24/2021 0800      Gross per 24 hour   Intake 760 ml   Output —   Net 760 ml            Physical Exam:     General: Appears comfortable in lady hours.     Interval Radiology:   XR CHEST PA + LAT CHEST (QJC=72497)     Result Date: 10/23/2021  CONCLUSION:  1. Hyperexpansion of the lungs.   2. Minimal right basilar atelectasis    Dictated by (CST): Mitchell James MD on 10/23/2021 at 12:26 PM     Fin evolving left lower lobe pneumonia rule out component airways obstruction  · History of hypertension echo with normal left ventricular function mild pulmonary pressure elevation without fluid history  · Chronic kidney disease followed by Dr. Christine Cuellar

## 2021-11-10 NOTE — PROGRESS NOTES
Elsa Mata Brave, 96/1935, 80year old, female is being discharged from [address] [place] [address] at [address] [facility]    Date of Admission: 10/25/21    Date of Discharge: 11/11/21                                 Admitting Diagnoses:  CK seasonal allergies      PHYSICAL EXAM:  GENERAL HEALTH: well developed, well nourished, in no apparent distress  LINES, TUBES, DRAINS:  none  SKIN: pink, warm, dry  WOUND: surgical healed lumbar spine  EYES: sclera anicteric, conjunctiva normal; there is n GFRNAA 34 (L) 11/04/2021    GFRAA 40 (L) 11/04/2021    CA 9.6 11/04/2021    OSMOCALC 305 (H) 11/04/2021    ALKPHO 72 11/04/2021    AST 10 (L) 11/04/2021    ALT 20 11/04/2021    BILT 0.3 11/04/2021    TP 6.5 11/04/2021    ALB 3.0 (L) 11/04/2021    GLOBULIN mg nightly  BP controlled  Vital signs every shift and as needed     CVA history 20 years ago, carotid stenosis  Was on aspirin 325 mg daily prior to surgery on hold now  On statin as well continues     Pain management  Monitor and assess pain  Tylenol 500

## 2021-11-11 ENCOUNTER — NURSE ONLY (OUTPATIENT)
Dept: LAB | Age: 86
End: 2021-11-11
Attending: FAMILY MEDICINE
Payer: MEDICARE

## 2021-11-11 ENCOUNTER — TELEPHONE (OUTPATIENT)
Dept: INTERNAL MEDICINE CLINIC | Facility: CLINIC | Age: 86
End: 2021-11-11

## 2021-11-11 DIAGNOSIS — E11.42 DIABETIC POLYNEUROPATHY (HCC): ICD-10-CM

## 2021-11-11 DIAGNOSIS — I10 ESSENTIAL HYPERTENSION, MALIGNANT: ICD-10-CM

## 2021-11-11 DIAGNOSIS — N18.4 CHRONIC KIDNEY DISEASE, STAGE IV (SEVERE) (HCC): Primary | ICD-10-CM

## 2021-11-11 PROBLEM — G47.33 OSA (OBSTRUCTIVE SLEEP APNEA): Status: ACTIVE | Noted: 2021-11-11

## 2021-11-11 PROCEDURE — 85025 COMPLETE CBC W/AUTO DIFF WBC: CPT

## 2021-11-11 PROCEDURE — 80053 COMPREHEN METABOLIC PANEL: CPT

## 2021-11-11 NOTE — TELEPHONE ENCOUNTER
Noted TCM referral order already placed yesterday Nazanin CHAVEZ from [place]. PSR - Please scheduled TCM HFU by 11/17/21. Thanks!

## 2021-11-12 ENCOUNTER — PATIENT OUTREACH (OUTPATIENT)
Dept: CASE MANAGEMENT | Age: 86
End: 2021-11-12

## 2021-11-12 DIAGNOSIS — I35.0 AORTIC STENOSIS, MILD: ICD-10-CM

## 2021-11-12 DIAGNOSIS — Z02.9 ENCOUNTERS FOR UNSPECIFIED ADMINISTRATIVE PURPOSE: ICD-10-CM

## 2021-11-12 PROCEDURE — 1111F DSCHRG MED/CURRENT MED MERGE: CPT

## 2021-11-12 RX ORDER — ASPIRIN 325 MG
325 TABLET ORAL DAILY
COMMUNITY

## 2021-11-12 NOTE — PROGRESS NOTES
Initial Post Discharge Follow Up   Discharge Date: 10/11/21 from [facility]  Contact Date: 11/12/2021    Consent Verification:  Assessment Completed With: Patient  HIPAA Verified?   Yes    Discharge Dx:     Status post lumbar microdiscectomy for spin mouth daily with breakfast.     • metFORMIN HCl  MG Oral Tablet 24 Hr Take 500 mg by mouth 2 (two) times daily with meals. • metoprolol succinate 25 MG Oral Tablet 24 Hr Take 12.5 mg by mouth daily.      • gabapentin 100 MG Oral Cap Take 200 mg by [facility])    Nov 16, 2021 11:20 AM [address] Follow Up with Sarita Hammans, [address],  [address] [facility] [address])        Dec 02, 2021 10:30 AM CST Post Op Visit with Christiane Arauz MD [facility] with patient,  and orders reviewed and discussed. Any changes or updates to medications and or orders sent to PCP.

## 2021-11-15 NOTE — PROGRESS NOTES
Called [facility] and voicemail received. Message left requesting refill on Breo for patient. Preferred pharmacy and NCM contact information provided.

## 2021-11-16 ENCOUNTER — OFFICE VISIT (OUTPATIENT)
Dept: INTERNAL MEDICINE CLINIC | Facility: CLINIC | Age: 86
End: 2021-11-16
Payer: MEDICARE

## 2021-11-16 VITALS
HEART RATE: 55 BPM | OXYGEN SATURATION: 91 % | WEIGHT: 164.69 LBS | TEMPERATURE: 98 F | SYSTOLIC BLOOD PRESSURE: 112 MMHG | DIASTOLIC BLOOD PRESSURE: 62 MMHG | HEIGHT: 64 IN | RESPIRATION RATE: 16 BRPM | BODY MASS INDEX: 28.12 KG/M2

## 2021-11-16 DIAGNOSIS — J43.9 PULMONARY EMPHYSEMA, UNSPECIFIED EMPHYSEMA TYPE (HCC): ICD-10-CM

## 2021-11-16 DIAGNOSIS — J47.1 BRONCHIECTASIS WITH ACUTE EXACERBATION (HCC): ICD-10-CM

## 2021-11-16 DIAGNOSIS — Z09 HOSPITAL DISCHARGE FOLLOW-UP: Primary | ICD-10-CM

## 2021-11-16 DIAGNOSIS — G47.33 OSA (OBSTRUCTIVE SLEEP APNEA): ICD-10-CM

## 2021-11-16 DIAGNOSIS — R09.02 HYPOXIA: ICD-10-CM

## 2021-11-16 DIAGNOSIS — E11.8 DIABETES MELLITUS TYPE 2 WITH COMPLICATIONS (HCC): ICD-10-CM

## 2021-11-16 PROBLEM — E11.9 DIABETES MELLITUS (HCC): Status: RESOLVED | Noted: 2019-01-29 | Resolved: 2021-11-16

## 2021-11-16 PROCEDURE — 99496 TRANSJ CARE MGMT HIGH F2F 7D: CPT | Performed by: INTERNAL MEDICINE

## 2021-11-16 PROCEDURE — 3078F DIAST BP <80 MM HG: CPT | Performed by: INTERNAL MEDICINE

## 2021-11-16 PROCEDURE — 3074F SYST BP LT 130 MM HG: CPT | Performed by: INTERNAL MEDICINE

## 2021-11-16 PROCEDURE — 94640 AIRWAY INHALATION TREATMENT: CPT | Performed by: INTERNAL MEDICINE

## 2021-11-16 PROCEDURE — 94664 DEMO&/EVAL PT USE INHALER: CPT | Performed by: INTERNAL MEDICINE

## 2021-11-16 PROCEDURE — 3008F BODY MASS INDEX DOCD: CPT | Performed by: INTERNAL MEDICINE

## 2021-11-16 RX ORDER — IPRATROPIUM BROMIDE AND ALBUTEROL SULFATE 2.5; .5 MG/3ML; MG/3ML
3 SOLUTION RESPIRATORY (INHALATION) 4 TIMES DAILY
Qty: 120 EACH | Refills: 2 | Status: SHIPPED | OUTPATIENT
Start: 2021-11-16

## 2021-11-16 RX ORDER — IPRATROPIUM BROMIDE AND ALBUTEROL SULFATE 2.5; .5 MG/3ML; MG/3ML
3 SOLUTION RESPIRATORY (INHALATION)
Status: DISCONTINUED | OUTPATIENT
Start: 2021-11-16 | End: 2021-11-16

## 2021-11-16 RX ORDER — IPRATROPIUM BROMIDE AND ALBUTEROL SULFATE 2.5; .5 MG/3ML; MG/3ML
3 SOLUTION RESPIRATORY (INHALATION) ONCE
Status: SHIPPED | OUTPATIENT
Start: 2021-11-16

## 2021-11-16 NOTE — PROGRESS NOTES
HPI:    Isabella Guzman is a 80year old female here today for hospital follow up. She was discharged from SNF, [address] [place] [address] to Home .  She was sent to SNF on the October 25th after inpatient discharge for back surgery , to further her rehabilitation and m pain.  Denies LLE symptoms or b/b incontinence.  Denies UE symptoms, chest pain, sob.  She does exercises at home as she can tolerate.     She had CVA 20 years when she was under a great deal of stress.  She had right sided numbness/weakness.  She went to therapy was instituted. she was discharged on breo 200 daily.   She has not picked up the prescription as it was ommitted from her bag of discharge medications and there was a delay in obtaining the Rx from her pulmonologist.    Pt was suspected of having CO daily with meals. metoprolol succinate 25 MG Oral Tablet 24 Hr, Take 12.5 mg by mouth daily. gabapentin 100 MG Oral Cap, Take 100 mg by mouth 2 (two) times daily. As tolerated.   Glucose Blood (ONETOUCH ULTRA) In Vitro Strip, USE TO TEST BLOOD SUGAR DAILY colonoscopy (07/23/2015); other (10/20/2021); and back surgery.     She family history includes Cancer in her brother and sister; Diabetes (age of onset: 80) in her mother; Pulmonary Disease in her sister; ruptured appendix in her father; vascular disease i 112 mmol/L 113 High     CO2   21.0 - 32.0 mmol/L 23.0    Anion Gap   0 - 18 mmol/L 5    BUN   7 - 18 mg/dL 29 High     Creatinine   0.55 - 1.02 mg/dL 1.36 High     Calcium, Total   8.5 - 10.1 mg/dL 9.4      Study Result    Narrative   PROCEDURE:  XR CHEST minutes spent on pt care today, for her hospital discharge f/u and an additional 60 minutes with monitoring and administering of medications in office       Orders Placed This Encounter      CMP in 3 months      Lipid in 3 months      Hemoglobin A1C in 3 m

## 2021-11-16 NOTE — PATIENT INSTRUCTIONS
Report to pulmonary:  Significant desaturation w/ambulation to 81% on 3 liters, 85% on 4 liters  94-97% at rest  Start breo right tomorrow    After duoneb , oxygen desaturation to 89-91% on 3 liters O2  duoneb ordered 4 times daily while awake

## 2021-11-18 ENCOUNTER — TELEPHONE (OUTPATIENT)
Dept: INTERNAL MEDICINE CLINIC | Facility: CLINIC | Age: 86
End: 2021-11-18

## 2021-11-18 NOTE — TELEPHONE ENCOUNTER
SHERI   Pt called and said that she found out her ipratropium-albuterol 0.5-2.5 (3) MG/3ML Inhalation Solution was recalled- PSR told pt to let pharmacy know to see if her RX was effected.  Pt will call pharmacy p

## 2021-11-19 PROBLEM — Z98.890 S/P LUMBAR MICRODISCECTOMY: Status: ACTIVE | Noted: 2021-11-19

## 2021-11-19 NOTE — PROGRESS NOTES
[address] Author: Abi Wild MD     1935 MRN [id number]   [facility]  Admission 10/20/21      Last Hospital Discharge 10/25/21 PCP Quin Claude, [address] of Discharge 10/25/21       Date of A ULTRA) In Vitro Strip, USE TO TEST BLOOD SUGAR DAILY  Lancets (ONETOUCH DELICA PLUS [id number]) Does not apply Misc, 1 lancet by Finger stick route as needed. AT LEAST DAILY  Sertraline HCl 50 MG Oral Tab, Take 1 tablet (50 mg total) by mouth daily.     No c vascular disease in her sister. She  reports that she quit smoking about 23 years ago. She has a 80.00 pack-year smoking history. She has never used smokeless tobacco. She reports that she does not drink alcohol and does not use drugs.      NERI:   JAS compr raise test and negative left straight leg raise test.   Lymphadenopathy:      Cervical: No cervical adenopathy. Skin:     General: Skin is warm and dry. Coloration: Skin is not pale. Findings: No erythema or rash.    Neurological:      Mental St transmitted to the [facility] [employer] of Radiology) Gregory Sargent [address] ([address] [place] [address]) which includes the Dose  Index Registry.   PATIENT STATED HISTORY: (As transcribed by Technologist)  Dyspnea    FINDINGS:  LUNGS:  Emphysematous changes with pleu imaging were performed. The following veins were imaged bilaterally:  Common, deep, and superficial femoral, popliteal, sapheno-femoral junction, and posterior tibial veins.   PATIENT STATED HISTORY: (As transcribed by Technologist)     FINDINGS:  Gennaro Chu spine.            CONCLUSION:  Patchy opacities noted at the left lung base. Differential includes atelectasis, pneumonia or lung masses. PA and lateral views recommended for further evaluation.     Dictated by (CST): Nikole Bangura MD on 10/20/2021 a risk for DVT prophylaxis and has been ambulating/using teds. -continue with Pain medication prn and constipation meds prn.        Aureliano Khanna needs then following services:  Occupational Therapy  Physical Therapy  Respiratory Therapy  wound care and

## 2021-11-23 ENCOUNTER — TELEPHONE (OUTPATIENT)
Dept: INTERNAL MEDICINE CLINIC | Facility: CLINIC | Age: 86
End: 2021-11-23

## 2021-11-23 ENCOUNTER — MED REC SCAN ONLY (OUTPATIENT)
Dept: INTERNAL MEDICINE CLINIC | Facility: CLINIC | Age: 86
End: 2021-11-23

## 2021-11-23 NOTE — TELEPHONE ENCOUNTER
Incoming (mail or fax):  fax  Received from:  [facility]  Documentation given to:  Triage    Home health orders

## 2021-11-23 NOTE — TELEPHONE ENCOUNTER
Received PT POC from [facility] #: [id number]   To Dr. Keon Nunes for review & signature(s). To fax back to # [phone] & send to scanning.

## 2021-11-30 ENCOUNTER — TELEPHONE (OUTPATIENT)
Dept: INTERNAL MEDICINE CLINIC | Facility: CLINIC | Age: 86
End: 2021-11-30

## 2021-11-30 NOTE — TELEPHONE ENCOUNTER
Incoming (mail or fax):  fax  Received from:  [facility]  Documentation given to:  Triage      OT plan of care

## 2021-12-01 ENCOUNTER — TELEPHONE (OUTPATIENT)
Dept: INTERNAL MEDICINE CLINIC | Facility: CLINIC | Age: 86
End: 2021-12-01

## 2021-12-01 NOTE — TELEPHONE ENCOUNTER
Received OT PLAN OF CARE from [facility]  Order# [id number]  To Dr. Lavern Skinner for review & signature(s). To fax back to # [phone] & send to scanning.

## 2021-12-01 NOTE — TELEPHONE ENCOUNTER
Incoming (mail or fax):  fax  Received from:  [facility]   Documentation given to: triage in basket     Needs to be signed and returned

## 2021-12-01 NOTE — TELEPHONE ENCOUNTER
Received PHYSICIAN ORDER from [place]   Order # [id number]  To Dr. Kylah Song for review & signature(s). To fax back to # [phone] & send to scanning.

## 2021-12-02 ENCOUNTER — OFFICE VISIT (OUTPATIENT)
Dept: SURGERY | Facility: CLINIC | Age: 86
End: 2021-12-02
Payer: MEDICARE

## 2021-12-02 VITALS — DIASTOLIC BLOOD PRESSURE: 60 MMHG | SYSTOLIC BLOOD PRESSURE: 110 MMHG | HEART RATE: 60 BPM

## 2021-12-02 DIAGNOSIS — M54.16 LUMBAR RADICULOPATHY: Primary | ICD-10-CM

## 2021-12-02 PROCEDURE — 3078F DIAST BP <80 MM HG: CPT | Performed by: NEUROLOGICAL SURGERY

## 2021-12-02 PROCEDURE — 99024 POSTOP FOLLOW-UP VISIT: CPT | Performed by: NEUROLOGICAL SURGERY

## 2021-12-02 PROCEDURE — 3074F SYST BP LT 130 MM HG: CPT | Performed by: NEUROLOGICAL SURGERY

## 2021-12-02 RX ORDER — NEBULIZER AND COMPRESSOR
1 EACH MISCELLANEOUS AS DIRECTED
COMMUNITY
Start: 2021-11-16

## 2021-12-02 NOTE — PROGRESS NOTES
Neurosurgery Clinic Visit  2021    Renee Sage PCP:  Isamar Menjivar MD    1935 MRN [id number]     HISTORY OF PRESENT ILLNESS:  Renee Sage is a(n) 80year old female here for follow-up status post microdiscectomy  She is doi

## 2021-12-02 NOTE — PROGRESS NOTES
Pt is here regarding: post op, sx 10.20.21        Pt states she good. Pt is doing PT 2 x a week and its helping her.

## 2021-12-03 ENCOUNTER — TELEPHONE (OUTPATIENT)
Dept: INTERNAL MEDICINE CLINIC | Facility: CLINIC | Age: 86
End: 2021-12-03

## 2021-12-03 NOTE — TELEPHONE ENCOUNTER
Incoming (mail or fax):  fax  Received from:  [facility]   Documentation given to:  Triage in basket     Needs to be signed and returned

## 2021-12-06 NOTE — TELEPHONE ENCOUNTER
Received:  Cert and Plan of Care from Elvi Ahumada. #[phone]/T# [phone]    Comparing med lists:   1) [place] med list has gabapentin titrating down. 2) 11/16/21 HFU visit with Dr Maxi Shetty - Mio Owusu and nebulizer was added. 3) metoprolol is on PCP list, not HH  4) sertraline on PCP list, not HH    Routed paperwork to Dr Maxi Shetty for review, signature, and to fax back/send to scan.   HFU was 11/16/21

## 2021-12-06 NOTE — TELEPHONE ENCOUNTER
Faxed back to [facility] at [phone]. Confirmed fax sent    To Nydia Abreu for billing.   Then send to scanning

## 2021-12-07 ENCOUNTER — MED REC SCAN ONLY (OUTPATIENT)
Dept: INTERNAL MEDICINE CLINIC | Facility: CLINIC | Age: 86
End: 2021-12-07

## 2021-12-22 ENCOUNTER — TELEPHONE (OUTPATIENT)
Dept: INTERNAL MEDICINE CLINIC | Facility: CLINIC | Age: 86
End: 2021-12-22

## 2021-12-22 NOTE — TELEPHONE ENCOUNTER
Incoming (mail or fax):  fax  [facility]   Documentation given to:  Triage in basket     Needs to be signed and returned

## 2021-12-22 NOTE — TELEPHONE ENCOUNTER
Received Discharge orders from [facility] PT with goal met   Discharge date 12/21/21  To Chris Ramos for review and signature    To fax back to [phone]

## 2021-12-23 ENCOUNTER — HOSPITAL ENCOUNTER (OUTPATIENT)
Dept: CV DIAGNOSTICS | Facility: HOSPITAL | Age: 86
Discharge: HOME | End: 2021-12-23
Attending: HOSPITALIST
Payer: MEDICARE

## 2021-12-23 DIAGNOSIS — R06.00 DYSPNEA: ICD-10-CM

## 2021-12-23 DIAGNOSIS — R09.02 HYPOXIA: ICD-10-CM

## 2021-12-23 PROCEDURE — 93306 TTE W/DOPPLER COMPLETE: CPT | Performed by: HOSPITALIST

## 2021-12-27 ENCOUNTER — TELEPHONE (OUTPATIENT)
Dept: INTERNAL MEDICINE CLINIC | Facility: CLINIC | Age: 86
End: 2021-12-27

## 2021-12-27 NOTE — TELEPHONE ENCOUNTER
Incoming (mail or fax):  fax  Received from:  [facility]   Documentation given to:  incoming triage     Physician order

## 2021-12-27 NOTE — TELEPHONE ENCOUNTER
Incoming (mail or fax): fax  Received from:  [facility]   Documentation given to:   Incoming triage    Plan of care

## 2021-12-27 NOTE — TELEPHONE ENCOUNTER
Plan of care received from Elvi Ahumada  To Dr. Aliza Gonzalez for review and signature    To fax back to [phone]

## 2021-12-27 NOTE — TELEPHONE ENCOUNTER
Received discharge orders from [facility] on 12/22/21    To Dr. Machelle Juarez for review and signature    To fax back to [phone]

## 2021-12-28 ENCOUNTER — MED REC SCAN ONLY (OUTPATIENT)
Dept: INTERNAL MEDICINE CLINIC | Facility: CLINIC | Age: 86
End: 2021-12-28

## 2021-12-29 ENCOUNTER — TELEPHONE (OUTPATIENT)
Dept: INTERNAL MEDICINE CLINIC | Facility: CLINIC | Age: 86
End: 2021-12-29

## 2021-12-29 ENCOUNTER — HOSPITAL ENCOUNTER (EMERGENCY)
Facility: HOSPITAL | Age: 86
Discharge: HOME | End: 2021-12-29
Attending: EMERGENCY MEDICINE
Payer: MEDICARE

## 2021-12-29 VITALS
TEMPERATURE: 98 F | OXYGEN SATURATION: 95 % | SYSTOLIC BLOOD PRESSURE: 134 MMHG | DIASTOLIC BLOOD PRESSURE: 75 MMHG | HEART RATE: 81 BPM | RESPIRATION RATE: 18 BRPM

## 2021-12-29 DIAGNOSIS — H92.01 EAR PAIN, RIGHT: Primary | ICD-10-CM

## 2021-12-29 PROCEDURE — 99283 EMERGENCY DEPT VISIT LOW MDM: CPT | Performed by: EMERGENCY MEDICINE

## 2021-12-29 RX ORDER — AMOXICILLIN 875 MG/1
875 TABLET, COATED ORAL 2 TIMES DAILY
Qty: 20 TABLET | Refills: 0 | Status: SHIPPED | OUTPATIENT
Start: 2021-12-29 | End: 2022-01-08
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
## 2021-12-29 NOTE — ED QUICK NOTES
Pt states she uses oxygen at home 3l/min and ran out of o2 this morning.  States she's having reanna at this time

## 2021-12-29 NOTE — TELEPHONE ENCOUNTER
Spoke to pt. C/o severe pain in right ear that started last night. States was in contact with someone over the weekend that tested positive recently. Rates pain 7 out of 10, constant. Denies fever or stiff neck.   Denies redness or swelling to right abdullahi

## 2022-01-05 ENCOUNTER — MED REC SCAN ONLY (OUTPATIENT)
Dept: INTERNAL MEDICINE CLINIC | Facility: CLINIC | Age: 87
End: 2022-01-05

## 2022-01-10 RX ORDER — ATORVASTATIN CALCIUM 20 MG/1
TABLET, FILM COATED ORAL
Qty: 90 TABLET | Refills: 0 | Status: SHIPPED | OUTPATIENT
Start: 2022-01-10

## 2022-01-25 ENCOUNTER — ORDER TRANSCRIPTION (OUTPATIENT)
Dept: SLEEP CENTER | Age: 87
End: 2022-01-25

## 2022-01-25 DIAGNOSIS — R09.02 HYPOXIA: Primary | ICD-10-CM

## 2022-01-25 DIAGNOSIS — Z01.818 PREOP EXAMINATION: Primary | ICD-10-CM

## 2022-01-25 DIAGNOSIS — Z11.59 SCREENING FOR VIRAL DISEASE: ICD-10-CM

## 2022-01-25 DIAGNOSIS — G47.33 OSA (OBSTRUCTIVE SLEEP APNEA): ICD-10-CM

## 2022-01-31 ENCOUNTER — LAB ENCOUNTER (OUTPATIENT)
Dept: LAB | Facility: HOSPITAL | Age: 87
End: 2022-01-31
Attending: Other
Payer: MEDICARE

## 2022-01-31 DIAGNOSIS — Z11.59 SCREENING FOR VIRAL DISEASE: ICD-10-CM

## 2022-01-31 DIAGNOSIS — Z01.818 PREOP EXAMINATION: ICD-10-CM

## 2022-01-31 LAB — SARS-COV-2 RNA RESP QL NAA+PROBE: NOT DETECTED

## 2022-02-02 ENCOUNTER — MED REC SCAN ONLY (OUTPATIENT)
Dept: INTERNAL MEDICINE CLINIC | Facility: CLINIC | Age: 87
End: 2022-02-02

## 2022-02-03 ENCOUNTER — ORDER TRANSCRIPTION (OUTPATIENT)
Dept: ADMINISTRATIVE | Facility: HOSPITAL | Age: 87
End: 2022-02-03

## 2022-02-07 ENCOUNTER — ORDER TRANSCRIPTION (OUTPATIENT)
Dept: SLEEP CENTER | Age: 87
End: 2022-02-07

## 2022-02-08 ENCOUNTER — LAB ENCOUNTER (OUTPATIENT)
Dept: LAB | Facility: HOSPITAL | Age: 87
End: 2022-02-08
Attending: Other
Payer: MEDICARE

## 2022-02-08 DIAGNOSIS — Z11.59 SCREENING FOR VIRAL DISEASE: ICD-10-CM

## 2022-02-08 DIAGNOSIS — Z01.818 PREOP EXAMINATION: ICD-10-CM

## 2022-02-08 LAB — SARS-COV-2 RNA RESP QL NAA+PROBE: NOT DETECTED

## 2022-02-08 RX ORDER — METOPROLOL SUCCINATE 25 MG/1
TABLET, EXTENDED RELEASE ORAL
Qty: 45 TABLET | Refills: 0 | Status: SHIPPED | OUTPATIENT
Start: 2022-02-08

## 2022-02-09 ENCOUNTER — OFFICE VISIT (OUTPATIENT)
Dept: SLEEP CENTER | Age: 87
End: 2022-02-09
Attending: Other
Payer: MEDICARE

## 2022-02-09 DIAGNOSIS — R09.02 HYPOXIA: ICD-10-CM

## 2022-02-09 DIAGNOSIS — G47.33 OSA (OBSTRUCTIVE SLEEP APNEA): ICD-10-CM

## 2022-02-09 PROCEDURE — 95811 POLYSOM 6/>YRS CPAP 4/> PARM: CPT

## 2022-02-11 ENCOUNTER — LAB ENCOUNTER (OUTPATIENT)
Dept: LAB | Facility: HOSPITAL | Age: 87
End: 2022-02-11
Attending: HOSPITALIST
Payer: MEDICARE

## 2022-02-11 DIAGNOSIS — Z01.818 PREOP EXAMINATION: ICD-10-CM

## 2022-02-11 DIAGNOSIS — Z11.59 ENCOUNTER FOR SCREENING FOR OTHER VIRAL DISEASES: ICD-10-CM

## 2022-02-12 LAB — SARS-COV-2 RNA RESP QL NAA+PROBE: NOT DETECTED

## 2022-02-14 ENCOUNTER — RT VISIT (OUTPATIENT)
Dept: RESPIRATORY THERAPY | Facility: HOSPITAL | Age: 87
End: 2022-02-14
Attending: HOSPITALIST
Payer: MEDICARE

## 2022-02-14 DIAGNOSIS — J43.2 CENTRILOBULAR EMPHYSEMA (HCC): ICD-10-CM

## 2022-02-14 DIAGNOSIS — R09.02 HYPOXIA: ICD-10-CM

## 2022-02-14 DIAGNOSIS — R06.00 DYSPNEA: ICD-10-CM

## 2022-02-14 PROCEDURE — 94729 DIFFUSING CAPACITY: CPT

## 2022-02-14 PROCEDURE — 94010 BREATHING CAPACITY TEST: CPT

## 2022-02-14 PROCEDURE — 94726 PLETHYSMOGRAPHY LUNG VOLUMES: CPT

## 2022-02-16 NOTE — PROCEDURES
Findings:  FEV1 is 2.39L, 134% predicted. FVC is 2.86L, 121% predicted. FEV1/ FVC ratio is 0.83. The flow-volume loop demonstrates a normal pattern. The TLC is 5.53L, 112% predicted. The residual volume 2.67L, 115% predicted. The diffusion capacity is 38% predicted and 47% predicted when corrected for alveolar volume. Impression:  There is no airway obstruction on spirometry and visualized on flow-volume loop. Lung volumes are normal.  Diffusion capacity is severely reduced with DLCO of 38%. The isolated decreased in diffusion capacity can be seen in interstitial lung disease, pulmonary vascular disease (such as pulmonary hypertension) and anemia. If not already performed, would suggest further evaluation as determined clinically. Additionally, given the severity of the reduced diffusion capacity, would recommend evaluation for hypoxia and supplemental oxygenation if not already performed. When compared to previous spirometry testing dated 10/22/2021, there has been non-significant change in spirometric volumes.

## 2022-02-25 ENCOUNTER — LAB ENCOUNTER (OUTPATIENT)
Dept: LAB | Facility: HOSPITAL | Age: 87
End: 2022-02-25
Attending: INTERNAL MEDICINE
Payer: MEDICARE

## 2022-02-25 ENCOUNTER — TELEPHONE (OUTPATIENT)
Dept: INTERNAL MEDICINE CLINIC | Facility: CLINIC | Age: 87
End: 2022-02-25

## 2022-02-25 DIAGNOSIS — E11.8 DIABETES MELLITUS TYPE 2 WITH COMPLICATIONS (HCC): ICD-10-CM

## 2022-02-25 DIAGNOSIS — E11.51 TYPE 2 DIABETES MELLITUS WITH ATHEROSCLEROSIS OF AORTA (HCC): ICD-10-CM

## 2022-02-25 DIAGNOSIS — I70.0 TYPE 2 DIABETES MELLITUS WITH ATHEROSCLEROSIS OF AORTA (HCC): ICD-10-CM

## 2022-02-25 DIAGNOSIS — I10 ESSENTIAL HYPERTENSION: ICD-10-CM

## 2022-02-25 DIAGNOSIS — N18.4 CKD (CHRONIC KIDNEY DISEASE) STAGE 4, GFR 15-29 ML/MIN (HCC): ICD-10-CM

## 2022-02-25 LAB
ALBUMIN SERPL-MCNC: 3.9 G/DL (ref 3.4–5)
ALBUMIN/GLOB SERPL: 1.2 {RATIO} (ref 1–2)
ALP LIVER SERPL-CCNC: 85 U/L
ALT SERPL-CCNC: 16 U/L
ANION GAP SERPL CALC-SCNC: 10 MMOL/L (ref 0–18)
AST SERPL-CCNC: 16 U/L (ref 15–37)
BILIRUB SERPL-MCNC: 0.7 MG/DL (ref 0.1–2)
BUN BLD-MCNC: 37 MG/DL (ref 7–18)
CALCIUM BLD-MCNC: 10.4 MG/DL (ref 8.5–10.1)
CHLORIDE SERPL-SCNC: 108 MMOL/L (ref 98–112)
CHOLEST SERPL-MCNC: 134 MG/DL (ref ?–200)
CO2 SERPL-SCNC: 22 MMOL/L (ref 21–32)
CREAT BLD-MCNC: 1.63 MG/DL
EST. AVERAGE GLUCOSE BLD GHB EST-MCNC: 143 MG/DL (ref 68–126)
FASTING PATIENT LIPID ANSWER: YES
FASTING STATUS PATIENT QL REPORTED: YES
GLOBULIN PLAS-MCNC: 3.2 G/DL (ref 2.8–4.4)
GLUCOSE BLD-MCNC: 161 MG/DL (ref 70–99)
HBA1C MFR BLD: 6.6 % (ref ?–5.7)
HDLC SERPL-MCNC: 67 MG/DL (ref 40–59)
LDLC SERPL CALC-MCNC: 46 MG/DL (ref ?–100)
NONHDLC SERPL-MCNC: 67 MG/DL (ref ?–130)
OSMOLALITY SERPL CALC.SUM OF ELEC: 302 MOSM/KG (ref 275–295)
POTASSIUM SERPL-SCNC: 4.5 MMOL/L (ref 3.5–5.1)
PROT SERPL-MCNC: 7.1 G/DL (ref 6.4–8.2)
SODIUM SERPL-SCNC: 140 MMOL/L (ref 136–145)
TRIGL SERPL-MCNC: 123 MG/DL (ref 30–149)
VLDLC SERPL CALC-MCNC: 17 MG/DL (ref 0–30)

## 2022-02-25 PROCEDURE — 83036 HEMOGLOBIN GLYCOSYLATED A1C: CPT

## 2022-02-25 PROCEDURE — 36415 COLL VENOUS BLD VENIPUNCTURE: CPT

## 2022-02-25 PROCEDURE — 80053 COMPREHEN METABOLIC PANEL: CPT

## 2022-02-25 PROCEDURE — 80061 LIPID PANEL: CPT

## 2022-02-25 NOTE — TELEPHONE ENCOUNTER
Spoke with pt,  verified. Pt was informed of MD recommendation, pt stated understanding. Pt stated she already made an appt with PCP.        Future Appointments   Date Time Provider Kevin Capone   3/8/2022 10:00 AM Miguel Mathur MD [facility] [address] [facility] [place]

## 2022-03-15 ENCOUNTER — TELEPHONE (OUTPATIENT)
Dept: INTERNAL MEDICINE CLINIC | Facility: CLINIC | Age: 87
End: 2022-03-15

## 2022-03-15 ENCOUNTER — OFFICE VISIT (OUTPATIENT)
Dept: INTERNAL MEDICINE CLINIC | Facility: CLINIC | Age: 87
End: 2022-03-15
Payer: MEDICARE

## 2022-03-15 VITALS
DIASTOLIC BLOOD PRESSURE: 60 MMHG | WEIGHT: 160 LBS | HEART RATE: 83 BPM | BODY MASS INDEX: 27 KG/M2 | SYSTOLIC BLOOD PRESSURE: 118 MMHG | OXYGEN SATURATION: 96 % | RESPIRATION RATE: 18 BRPM | TEMPERATURE: 98 F

## 2022-03-15 DIAGNOSIS — E21.0 HYPERPARATHYROIDISM, PRIMARY (HCC): ICD-10-CM

## 2022-03-15 DIAGNOSIS — I35.0 AORTIC STENOSIS, MILD: ICD-10-CM

## 2022-03-15 DIAGNOSIS — I77.9 BILATERAL CAROTID ARTERY DISEASE, UNSPECIFIED TYPE (HCC): ICD-10-CM

## 2022-03-15 DIAGNOSIS — I10 ESSENTIAL HYPERTENSION: ICD-10-CM

## 2022-03-15 DIAGNOSIS — N18.4 CKD (CHRONIC KIDNEY DISEASE) STAGE 4, GFR 15-29 ML/MIN (HCC): ICD-10-CM

## 2022-03-15 DIAGNOSIS — E11.8 DIABETES MELLITUS TYPE 2 WITH COMPLICATIONS (HCC): Primary | ICD-10-CM

## 2022-03-15 DIAGNOSIS — F33.0 MILD RECURRENT MAJOR DEPRESSION (HCC): ICD-10-CM

## 2022-03-15 PROBLEM — Z87.39 HISTORY OF GOUT: Status: ACTIVE | Noted: 2022-03-15

## 2022-03-15 PROBLEM — M54.16 LUMBAR RADICULOPATHY: Status: RESOLVED | Noted: 2021-06-02 | Resolved: 2022-03-15

## 2022-03-15 PROCEDURE — 99214 OFFICE O/P EST MOD 30 MIN: CPT | Performed by: INTERNAL MEDICINE

## 2022-03-15 PROCEDURE — 3074F SYST BP LT 130 MM HG: CPT | Performed by: INTERNAL MEDICINE

## 2022-03-15 PROCEDURE — 3078F DIAST BP <80 MM HG: CPT | Performed by: INTERNAL MEDICINE

## 2022-03-15 RX ORDER — LANCETS 33 GAUGE
1 EACH MISCELLANEOUS AS NEEDED
Qty: 100 EACH | Refills: 3 | Status: SHIPPED | OUTPATIENT
Start: 2022-03-15

## 2022-03-15 NOTE — TELEPHONE ENCOUNTER
Called dr Martínez's office  [phone] and spoke with Bradley Krishna, she will be faxing over the notes as soon as possible.

## 2022-03-15 NOTE — TELEPHONE ENCOUNTER
Incoming (mail or fax):  fax  Received from:  Dr Robert Alvarez office  Documentation given to:  Jam Dallas

## 2022-03-24 ENCOUNTER — MED REC SCAN ONLY (OUTPATIENT)
Dept: INTERNAL MEDICINE CLINIC | Facility: CLINIC | Age: 87
End: 2022-03-24

## 2022-03-31 ENCOUNTER — TELEPHONE (OUTPATIENT)
Dept: INTERNAL MEDICINE CLINIC | Facility: CLINIC | Age: 87
End: 2022-03-31

## 2022-03-31 NOTE — TELEPHONE ENCOUNTER
Incoming (mail or fax):  mail  Received from:  patient  Documentation given to:  Triage incoming bin    Certification for parking placard. Patient included a check to pay for form completion. Does Dr Paulo Dominguez want to charge for the form?

## 2022-03-31 NOTE — TELEPHONE ENCOUNTER
Patient returned call. Per patient request, PSR mailed copy of form and check to patient's home address.

## 2022-03-31 NOTE — TELEPHONE ENCOUNTER
Sent copy to scan   Left detailed message per hippa  Made copy and put it in envelope with check if pt asks

## 2022-03-31 NOTE — TELEPHONE ENCOUNTER
No previous placard form on file   Filled out as able   To Dr. Muriel Hampton for review and signature

## 2022-04-05 ENCOUNTER — MED REC SCAN ONLY (OUTPATIENT)
Dept: INTERNAL MEDICINE CLINIC | Facility: CLINIC | Age: 87
End: 2022-04-05

## 2022-04-20 RX ORDER — BLOOD SUGAR DIAGNOSTIC
STRIP MISCELLANEOUS
Qty: 100 STRIP | Refills: 0 | Status: SHIPPED | OUTPATIENT
Start: 2022-04-20

## 2022-04-21 RX ORDER — ALLOPURINOL 100 MG/1
TABLET ORAL
Qty: 180 TABLET | Refills: 1 | Status: SHIPPED | OUTPATIENT
Start: 2022-04-21

## 2022-04-26 ENCOUNTER — TELEPHONE (OUTPATIENT)
Dept: INTERNAL MEDICINE CLINIC | Facility: CLINIC | Age: 87
End: 2022-04-26

## 2022-05-03 ENCOUNTER — MED REC SCAN ONLY (OUTPATIENT)
Dept: INTERNAL MEDICINE CLINIC | Facility: CLINIC | Age: 87
End: 2022-05-03

## 2022-05-03 ENCOUNTER — TELEPHONE (OUTPATIENT)
Dept: INTERNAL MEDICINE CLINIC | Facility: CLINIC | Age: 87
End: 2022-05-03

## 2022-05-03 NOTE — TELEPHONE ENCOUNTER
Patient notified we would not be involved in completing prior auth as test was not ordered by our office and is not being completed at [facility], patient verbalized understanding.

## 2022-05-03 NOTE — TELEPHONE ENCOUNTER
Pt needs prior auth for cardiac MRI at [facility], ordered by Dr Birdie Nicole. Pt does not have it scheduled yet.

## 2022-05-05 RX ORDER — BLOOD SUGAR DIAGNOSTIC
STRIP MISCELLANEOUS
Qty: 100 STRIP | Refills: 0 | Status: SHIPPED | OUTPATIENT
Start: 2022-05-05

## 2022-05-11 ENCOUNTER — TELEPHONE (OUTPATIENT)
Dept: INTERNAL MEDICINE CLINIC | Facility: CLINIC | Age: 87
End: 2022-05-11

## 2022-05-11 NOTE — TELEPHONE ENCOUNTER
Incoming (mail or fax):  fax  Received from:  [facility] - Dr Saadia Jerez  Documentation given to:  Dr Tiffanie Jett

## 2022-05-31 ENCOUNTER — TELEPHONE (OUTPATIENT)
Dept: INTERNAL MEDICINE CLINIC | Facility: CLINIC | Age: 87
End: 2022-05-31

## 2022-05-31 NOTE — TELEPHONE ENCOUNTER
Received DM eye exam. Updated health maintenance. Placed in Dr. Waleska Dawson in folder for review.

## 2022-05-31 NOTE — TELEPHONE ENCOUNTER
Incoming (mail or fax):  fax  Received from:  [facility]   Documentation given to:  [facility] exam

## 2022-06-04 ENCOUNTER — MED REC SCAN ONLY (OUTPATIENT)
Dept: INTERNAL MEDICINE CLINIC | Facility: CLINIC | Age: 87
End: 2022-06-04

## 2022-06-27 ENCOUNTER — OFFICE VISIT (OUTPATIENT)
Dept: INTERNAL MEDICINE CLINIC | Facility: CLINIC | Age: 87
End: 2022-06-27
Payer: MEDICARE

## 2022-06-27 VITALS
HEART RATE: 70 BPM | RESPIRATION RATE: 16 BRPM | SYSTOLIC BLOOD PRESSURE: 126 MMHG | OXYGEN SATURATION: 93 % | HEIGHT: 64.33 IN | BODY MASS INDEX: 28.05 KG/M2 | DIASTOLIC BLOOD PRESSURE: 62 MMHG | TEMPERATURE: 97 F | WEIGHT: 164.31 LBS

## 2022-06-27 DIAGNOSIS — Z87.39 HISTORY OF GOUT: ICD-10-CM

## 2022-06-27 DIAGNOSIS — G47.33 OSA (OBSTRUCTIVE SLEEP APNEA): ICD-10-CM

## 2022-06-27 DIAGNOSIS — I10 ESSENTIAL HYPERTENSION: ICD-10-CM

## 2022-06-27 DIAGNOSIS — N25.81 SECONDARY HYPERPARATHYROIDISM (HCC): ICD-10-CM

## 2022-06-27 DIAGNOSIS — R19.7 DIARRHEA, UNSPECIFIED TYPE: ICD-10-CM

## 2022-06-27 DIAGNOSIS — N18.4 CKD (CHRONIC KIDNEY DISEASE) STAGE 4, GFR 15-29 ML/MIN (HCC): ICD-10-CM

## 2022-06-27 DIAGNOSIS — J43.9 PULMONARY EMPHYSEMA, UNSPECIFIED EMPHYSEMA TYPE (HCC): ICD-10-CM

## 2022-06-27 DIAGNOSIS — E11.8 DIABETES MELLITUS TYPE 2 WITH COMPLICATIONS (HCC): ICD-10-CM

## 2022-06-27 DIAGNOSIS — Z86.73 HISTORY OF STROKE: ICD-10-CM

## 2022-06-27 DIAGNOSIS — F33.0 MILD RECURRENT MAJOR DEPRESSION (HCC): ICD-10-CM

## 2022-06-27 DIAGNOSIS — J47.9 BRONCHIECTASIS WITHOUT COMPLICATION (HCC): ICD-10-CM

## 2022-06-27 DIAGNOSIS — E11.42 DIABETIC POLYNEUROPATHY ASSOCIATED WITH TYPE 2 DIABETES MELLITUS (HCC): ICD-10-CM

## 2022-06-27 DIAGNOSIS — E21.0 HYPERPARATHYROIDISM, PRIMARY (HCC): ICD-10-CM

## 2022-06-27 DIAGNOSIS — N39.41 URGE INCONTINENCE OF URINE: ICD-10-CM

## 2022-06-27 DIAGNOSIS — I77.9 BILATERAL CAROTID ARTERY DISEASE, UNSPECIFIED TYPE (HCC): ICD-10-CM

## 2022-06-27 DIAGNOSIS — Z00.00 ENCOUNTER FOR ANNUAL HEALTH EXAMINATION: Primary | ICD-10-CM

## 2022-06-27 DIAGNOSIS — I35.0 AORTIC STENOSIS, MILD: ICD-10-CM

## 2022-06-27 PROBLEM — M51.369 DDD (DEGENERATIVE DISC DISEASE), LUMBAR: Status: RESOLVED | Noted: 2021-06-02 | Resolved: 2022-06-27

## 2022-06-27 PROBLEM — M51.36 DDD (DEGENERATIVE DISC DISEASE), LUMBAR: Status: RESOLVED | Noted: 2021-06-02 | Resolved: 2022-06-27

## 2022-06-27 PROBLEM — M47.816 ARTHRITIS OF FACET JOINT OF LUMBAR SPINE: Status: RESOLVED | Noted: 2021-05-25 | Resolved: 2022-06-27

## 2022-06-27 LAB
BILIRUB UR QL STRIP.AUTO: NEGATIVE
BILIRUBIN: NEGATIVE
COLOR UR AUTO: YELLOW
GLUCOSE (URINE DIPSTICK): NEGATIVE MG/DL
GLUCOSE UR STRIP.AUTO-MCNC: NEGATIVE MG/DL
KETONES (URINE DIPSTICK): NEGATIVE MG/DL
KETONES UR STRIP.AUTO-MCNC: NEGATIVE MG/DL
MULTISTIX LOT#: ABNORMAL NUMERIC
NITRITE UR QL STRIP.AUTO: POSITIVE
NITRITE, URINE: NEGATIVE
OCCULT BLOOD: NEGATIVE
PH UR STRIP.AUTO: 9 [PH] (ref 5–8)
PH, URINE: 7 (ref 4.5–8)
PROT UR STRIP.AUTO-MCNC: NEGATIVE MG/DL
PROTEIN (URINE DIPSTICK): NEGATIVE MG/DL
SP GR UR STRIP.AUTO: 1.01 (ref 1–1.03)
SPECIFIC GRAVITY: 1.02 (ref 1–1.03)
URINE-COLOR: YELLOW
UROBILINOGEN UR STRIP.AUTO-MCNC: <2 MG/DL
UROBILINOGEN,SEMI-QN: 0.2 MG/DL (ref 0–1.9)

## 2022-06-27 PROCEDURE — 87077 CULTURE AEROBIC IDENTIFY: CPT | Performed by: INTERNAL MEDICINE

## 2022-06-27 PROCEDURE — 87086 URINE CULTURE/COLONY COUNT: CPT | Performed by: INTERNAL MEDICINE

## 2022-06-27 PROCEDURE — 81001 URINALYSIS AUTO W/SCOPE: CPT | Performed by: INTERNAL MEDICINE

## 2022-06-27 PROCEDURE — 87186 SC STD MICRODIL/AGAR DIL: CPT | Performed by: INTERNAL MEDICINE

## 2022-06-27 RX ORDER — METFORMIN HYDROCHLORIDE 500 MG/1
500 TABLET, EXTENDED RELEASE ORAL
Refills: 0 | COMMUNITY
Start: 2022-06-27

## 2022-06-30 DIAGNOSIS — E11.8 DIABETES MELLITUS TYPE 2 WITH COMPLICATIONS (HCC): Primary | ICD-10-CM

## 2022-06-30 DIAGNOSIS — E11.51 TYPE 2 DIABETES MELLITUS WITH DIABETIC PERIPHERAL ANGIOPATHY WITHOUT GANGRENE (HCC): ICD-10-CM

## 2022-06-30 DIAGNOSIS — I10 ESSENTIAL (PRIMARY) HYPERTENSION: ICD-10-CM

## 2022-06-30 DIAGNOSIS — E11.42 DIABETIC POLYNEUROPATHY ASSOCIATED WITH TYPE 2 DIABETES MELLITUS (HCC): ICD-10-CM

## 2022-06-30 RX ORDER — LANCETS 33 GAUGE
1 EACH MISCELLANEOUS AS NEEDED
Qty: 100 EACH | Refills: 3 | Status: SHIPPED | OUTPATIENT
Start: 2022-06-30

## 2022-06-30 RX ORDER — ATORVASTATIN CALCIUM 20 MG/1
TABLET, FILM COATED ORAL
Qty: 90 TABLET | Refills: 0 | Status: SHIPPED | OUTPATIENT
Start: 2022-06-30 | End: 2022-09-26

## 2022-06-30 RX ORDER — LANCETS 33 GAUGE
1 EACH MISCELLANEOUS AS NEEDED
Qty: 100 EACH | Refills: 3 | Status: SHIPPED | OUTPATIENT
Start: 2022-06-30 | End: 2022-06-30

## 2022-06-30 RX ORDER — METOPROLOL SUCCINATE 25 MG/1
TABLET, EXTENDED RELEASE ORAL
Qty: 45 TABLET | Refills: 0 | Status: SHIPPED | OUTPATIENT
Start: 2022-06-30 | End: 2022-11-04

## 2022-06-30 RX ORDER — GLIMEPIRIDE 1 MG/1
1 TABLET ORAL
Qty: 90 TABLET | Refills: 0 | Status: SHIPPED | OUTPATIENT
Start: 2022-06-30 | End: 2022-09-29

## 2022-06-30 NOTE — TELEPHONE ENCOUNTER
Received clarification request from [facility] for one touch lancets   Sent rx with appropriate dx codes  Spoke w/Helen at pharmacy she said when new rx was sent the dx codes didn't come through  The 2nd time the dx codes still didn't go through with the rx    Had to resend the rx with dx codes in the note to pharmacy so pharmacy could see them   All previous rx's were cancelled by pharmacy

## 2022-06-30 NOTE — TELEPHONE ENCOUNTER
Last OV relevant to medication: 6/27/22  Last refill date: glimepiride historical     #/refills:   When pt was asked to return for OV:  Upcoming appt/reason: No future appointments.     Was pt informed of any over due labs: n/a   Lab Results   Component Value Date     (H) 02/25/2022    BUN 37 (H) 02/25/2022    BUNCREA 23.5 (H) 06/28/2021    CREATSERUM 1.63 (H) 02/25/2022    ANIONGAP 10 02/25/2022    GFR 28 (L) 04/05/2017    GFRNAA 28 (L) 02/25/2022    GFRAA 33 (L) 02/25/2022    CA 10.4 (H) 02/25/2022    OSMOCALC 302 (H) 02/25/2022    ALKPHO 85 02/25/2022    AST 16 02/25/2022    ALT 16 02/25/2022    BILT 0.7 02/25/2022    TP 7.1 02/25/2022    ALB 3.9 02/25/2022    GLOBULIN 3.2 02/25/2022     02/25/2022    K 4.5 02/25/2022     02/25/2022    CO2 22.0 02/25/2022     Lab Results   Component Value Date     (H) 02/25/2022    A1C 6.6 (H) 02/25/2022

## 2022-07-06 ENCOUNTER — MED REC SCAN ONLY (OUTPATIENT)
Dept: INTERNAL MEDICINE CLINIC | Facility: CLINIC | Age: 87
End: 2022-07-06

## 2022-08-18 DIAGNOSIS — I70.0 TYPE 2 DIABETES MELLITUS WITH ATHEROSCLEROSIS OF AORTA (HCC): ICD-10-CM

## 2022-08-18 DIAGNOSIS — E11.51 TYPE 2 DIABETES MELLITUS WITH ATHEROSCLEROSIS OF AORTA (HCC): ICD-10-CM

## 2022-08-19 RX ORDER — BLOOD SUGAR DIAGNOSTIC
STRIP MISCELLANEOUS
Qty: 100 STRIP | Refills: 1 | Status: SHIPPED | OUTPATIENT
Start: 2022-08-19

## 2022-09-20 RX ORDER — METFORMIN HYDROCHLORIDE 500 MG/1
500 TABLET, EXTENDED RELEASE ORAL
Qty: 30 TABLET | Refills: 0 | Status: SHIPPED | OUTPATIENT
Start: 2022-09-20

## 2022-09-20 NOTE — TELEPHONE ENCOUNTER
Last OV relevant to medication: 6/27/22 PE   Last refill date: 6/27/22 historical     #/refills: 0  When pt was asked to return for OV: 1 year   Upcoming appt/reason: No future appointments. Was pt informed of any over due labs: due.  Pt notified through [facility]     Lab Results   Component Value Date     (H) 02/25/2022    BUN 37 (H) 02/25/2022    BUNCREA 23.5 (H) 06/28/2021    CREATSERUM 1.63 (H) 02/25/2022    ANIONGAP 10 02/25/2022    GFR 28 (L) 04/05/2017    GFRNAA 28 (L) 02/25/2022    GFRAA 33 (L) 02/25/2022    CA 10.4 (H) 02/25/2022    OSMOCALC 302 (H) 02/25/2022    ALKPHO 85 02/25/2022    AST 16 02/25/2022    ALT 16 02/25/2022    BILT 0.7 02/25/2022    TP 7.1 02/25/2022    ALB 3.9 02/25/2022    GLOBULIN 3.2 02/25/2022     02/25/2022    K 4.5 02/25/2022     02/25/2022    CO2 22.0 02/25/2022     Lab Results   Component Value Date     (H) 02/25/2022    A1C 6.6 (H) 02/25/2022

## 2022-09-26 DIAGNOSIS — E11.51 TYPE 2 DIABETES MELLITUS WITH DIABETIC PERIPHERAL ANGIOPATHY WITHOUT GANGRENE (HCC): ICD-10-CM

## 2022-09-26 DIAGNOSIS — I10 ESSENTIAL (PRIMARY) HYPERTENSION: ICD-10-CM

## 2022-09-27 RX ORDER — ATORVASTATIN CALCIUM 20 MG/1
20 TABLET, FILM COATED ORAL NIGHTLY
Qty: 30 TABLET | Refills: 0 | Status: SHIPPED | OUTPATIENT
Start: 2022-09-27

## 2022-09-27 RX ORDER — METOPROLOL SUCCINATE 25 MG/1
12.5 TABLET, EXTENDED RELEASE ORAL DAILY
Qty: 45 TABLET | Refills: 0 | OUTPATIENT
Start: 2022-09-27

## 2022-09-27 NOTE — TELEPHONE ENCOUNTER
Changed refill request from 90 to 30. Pt needs to complete labs before additional refills can be given.

## 2022-09-29 RX ORDER — GLIMEPIRIDE 1 MG/1
1 TABLET ORAL
Qty: 30 TABLET | Refills: 0 | Status: SHIPPED | OUTPATIENT
Start: 2022-09-29

## 2022-09-29 NOTE — TELEPHONE ENCOUNTER
Last OV relevant to medication: 6/27/22 K[facility]   Last refill date: 6/30/22   90   #/refills: 0  When pt was asked to return for OV: 1 years   Upcoming appt/reason:   Future Appointments   Date Time Provider Kevin Capone   11/4/2022  1:00 PM Elif Zamora MD [facility]     Was pt informed of any over due labs: due .  Pt notified through [facility]     Lab Results   Component Value Date     (H) 02/25/2022    BUN 37 (H) 02/25/2022    BUNCREA 23.5 (H) 06/28/2021    CREATSERUM 1.63 (H) 02/25/2022    ANIONGAP 10 02/25/2022    GFR 28 (L) 04/05/2017    GFRNAA 28 (L) 02/25/2022    GFRAA 33 (L) 02/25/2022    CA 10.4 (H) 02/25/2022    OSMOCALC 302 (H) 02/25/2022    ALKPHO 85 02/25/2022    AST 16 02/25/2022    ALT 16 02/25/2022    BILT 0.7 02/25/2022    TP 7.1 02/25/2022    ALB 3.9 02/25/2022    GLOBULIN 3.2 02/25/2022     02/25/2022    K 4.5 02/25/2022     02/25/2022    CO2 22.0 02/25/2022     Lab Results   Component Value Date     (H) 02/25/2022    A1C 6.6 (H) 02/25/2022

## 2022-10-18 ENCOUNTER — MED REC SCAN ONLY (OUTPATIENT)
Facility: CLINIC | Age: 87
End: 2022-10-18

## 2022-10-18 DIAGNOSIS — F33.0 MILD RECURRENT MAJOR DEPRESSION (HCC): ICD-10-CM

## 2022-10-18 DIAGNOSIS — M10.9 GOUT, UNSPECIFIED: ICD-10-CM

## 2022-10-18 RX ORDER — ALLOPURINOL 100 MG/1
200 TABLET ORAL DAILY
Qty: 60 TABLET | Refills: 0 | Status: SHIPPED | OUTPATIENT
Start: 2022-10-18

## 2022-10-24 ENCOUNTER — LAB ENCOUNTER (OUTPATIENT)
Dept: LAB | Facility: HOSPITAL | Age: 87
End: 2022-10-24
Attending: INTERNAL MEDICINE
Payer: MEDICARE

## 2022-10-24 DIAGNOSIS — E11.8 DIABETES MELLITUS TYPE 2 WITH COMPLICATIONS (HCC): ICD-10-CM

## 2022-10-24 LAB
ALBUMIN SERPL-MCNC: 3.7 G/DL (ref 3.4–5)
ALBUMIN/GLOB SERPL: 1.1 {RATIO} (ref 1–2)
ALP LIVER SERPL-CCNC: 71 U/L
ALT SERPL-CCNC: 16 U/L
ANION GAP SERPL CALC-SCNC: 7 MMOL/L (ref 0–18)
AST SERPL-CCNC: 7 U/L (ref 15–37)
BILIRUB SERPL-MCNC: 0.7 MG/DL (ref 0.1–2)
BUN BLD-MCNC: 33 MG/DL (ref 7–18)
CALCIUM BLD-MCNC: 10.3 MG/DL (ref 8.5–10.1)
CHLORIDE SERPL-SCNC: 115 MMOL/L (ref 98–112)
CHOLEST SERPL-MCNC: 125 MG/DL (ref ?–200)
CO2 SERPL-SCNC: 22 MMOL/L (ref 21–32)
CREAT BLD-MCNC: 1.51 MG/DL
CREAT UR-SCNC: 84.3 MG/DL
EST. AVERAGE GLUCOSE BLD GHB EST-MCNC: 163 MG/DL (ref 68–126)
FASTING PATIENT LIPID ANSWER: YES
FASTING STATUS PATIENT QL REPORTED: YES
GFR SERPLBLD BASED ON 1.73 SQ M-ARVRAT: 33 ML/MIN/1.73M2 (ref 60–?)
GLOBULIN PLAS-MCNC: 3.4 G/DL (ref 2.8–4.4)
GLUCOSE BLD-MCNC: 149 MG/DL (ref 70–99)
HBA1C MFR BLD: 7.3 % (ref ?–5.7)
HDLC SERPL-MCNC: 57 MG/DL (ref 40–59)
LDLC SERPL CALC-MCNC: 48 MG/DL (ref ?–100)
MICROALBUMIN UR-MCNC: 1.61 MG/DL
MICROALBUMIN/CREAT 24H UR-RTO: 19.1 UG/MG (ref ?–30)
NONHDLC SERPL-MCNC: 68 MG/DL (ref ?–130)
OSMOLALITY SERPL CALC.SUM OF ELEC: 308 MOSM/KG (ref 275–295)
POTASSIUM SERPL-SCNC: 5 MMOL/L (ref 3.5–5.1)
PROT SERPL-MCNC: 7.1 G/DL (ref 6.4–8.2)
SODIUM SERPL-SCNC: 144 MMOL/L (ref 136–145)
TRIGL SERPL-MCNC: 109 MG/DL (ref 30–149)
VLDLC SERPL CALC-MCNC: 15 MG/DL (ref 0–30)

## 2022-10-24 PROCEDURE — 80053 COMPREHEN METABOLIC PANEL: CPT

## 2022-10-24 PROCEDURE — 83036 HEMOGLOBIN GLYCOSYLATED A1C: CPT

## 2022-10-24 PROCEDURE — 36415 COLL VENOUS BLD VENIPUNCTURE: CPT

## 2022-10-24 PROCEDURE — 82043 UR ALBUMIN QUANTITATIVE: CPT

## 2022-10-24 PROCEDURE — 82570 ASSAY OF URINE CREATININE: CPT

## 2022-10-24 PROCEDURE — 80061 LIPID PANEL: CPT

## 2022-11-04 ENCOUNTER — OFFICE VISIT (OUTPATIENT)
Dept: INTERNAL MEDICINE CLINIC | Facility: CLINIC | Age: 87
End: 2022-11-04
Payer: MEDICARE

## 2022-11-04 VITALS
TEMPERATURE: 97 F | DIASTOLIC BLOOD PRESSURE: 60 MMHG | BODY MASS INDEX: 28.06 KG/M2 | HEIGHT: 64.33 IN | WEIGHT: 164.38 LBS | SYSTOLIC BLOOD PRESSURE: 140 MMHG | RESPIRATION RATE: 16 BRPM | HEART RATE: 80 BPM

## 2022-11-04 DIAGNOSIS — E11.8 DIABETES MELLITUS TYPE 2 WITH COMPLICATIONS (HCC): ICD-10-CM

## 2022-11-04 DIAGNOSIS — I10 ESSENTIAL (PRIMARY) HYPERTENSION: ICD-10-CM

## 2022-11-04 DIAGNOSIS — G89.29 CHRONIC RIGHT-SIDED LOW BACK PAIN WITH RIGHT-SIDED SCIATICA: Primary | ICD-10-CM

## 2022-11-04 DIAGNOSIS — G47.33 OSA (OBSTRUCTIVE SLEEP APNEA): ICD-10-CM

## 2022-11-04 DIAGNOSIS — F33.0 MILD RECURRENT MAJOR DEPRESSION (HCC): Chronic | ICD-10-CM

## 2022-11-04 DIAGNOSIS — Z87.39 HISTORY OF GOUT: ICD-10-CM

## 2022-11-04 DIAGNOSIS — J43.9 PULMONARY EMPHYSEMA, UNSPECIFIED EMPHYSEMA TYPE (HCC): ICD-10-CM

## 2022-11-04 DIAGNOSIS — N18.4 CKD (CHRONIC KIDNEY DISEASE) STAGE 4, GFR 15-29 ML/MIN (HCC): Chronic | ICD-10-CM

## 2022-11-04 DIAGNOSIS — M54.41 CHRONIC RIGHT-SIDED LOW BACK PAIN WITH RIGHT-SIDED SCIATICA: Primary | ICD-10-CM

## 2022-11-04 DIAGNOSIS — I77.9 BILATERAL CAROTID ARTERY DISEASE, UNSPECIFIED TYPE (HCC): ICD-10-CM

## 2022-11-04 DIAGNOSIS — E21.0 HYPERPARATHYROIDISM, PRIMARY (HCC): ICD-10-CM

## 2022-11-04 PROCEDURE — 3008F BODY MASS INDEX DOCD: CPT | Performed by: INTERNAL MEDICINE

## 2022-11-04 PROCEDURE — 3077F SYST BP >= 140 MM HG: CPT | Performed by: INTERNAL MEDICINE

## 2022-11-04 PROCEDURE — 99214 OFFICE O/P EST MOD 30 MIN: CPT | Performed by: INTERNAL MEDICINE

## 2022-11-04 PROCEDURE — 3078F DIAST BP <80 MM HG: CPT | Performed by: INTERNAL MEDICINE

## 2022-11-04 RX ORDER — METFORMIN HYDROCHLORIDE 500 MG/1
500 TABLET, EXTENDED RELEASE ORAL
COMMUNITY
Start: 2022-11-04

## 2022-11-04 RX ORDER — METOPROLOL SUCCINATE 25 MG/1
12.5 TABLET, EXTENDED RELEASE ORAL DAILY
Qty: 45 TABLET | Refills: 1 | Status: SHIPPED | OUTPATIENT
Start: 2022-11-04

## 2022-11-04 RX ORDER — ATORVASTATIN CALCIUM 20 MG/1
20 TABLET, FILM COATED ORAL NIGHTLY
Qty: 90 TABLET | Refills: 1 | Status: SHIPPED | OUTPATIENT
Start: 2022-11-04

## 2022-11-04 RX ORDER — GLIMEPIRIDE 1 MG/1
1 TABLET ORAL 2 TIMES DAILY
Qty: 180 TABLET | Refills: 0 | Status: SHIPPED | OUTPATIENT
Start: 2022-11-04

## 2022-11-12 DIAGNOSIS — F33.0 MILD RECURRENT MAJOR DEPRESSION (HCC): ICD-10-CM

## 2022-11-14 NOTE — TELEPHONE ENCOUNTER
Last OV relevant to medication: 11/04/2022   Last refill date: 10/18/2022     #/refills: 30-0  When pt was asked to return for OV: Return in about 6 months (around 5/4/2023) for supervisit  Upcoming appt/reason: n/a  Was pt informed of any over due labs: not due

## 2022-11-20 DIAGNOSIS — M10.9 GOUT, UNSPECIFIED: ICD-10-CM

## 2022-11-21 RX ORDER — ALLOPURINOL 100 MG/1
TABLET ORAL
Qty: 180 TABLET | Refills: 1 | Status: SHIPPED | OUTPATIENT
Start: 2022-11-21

## 2022-12-05 ENCOUNTER — TELEPHONE (OUTPATIENT)
Dept: INTERNAL MEDICINE CLINIC | Facility: CLINIC | Age: 87
End: 2022-12-05

## 2022-12-06 NOTE — TELEPHONE ENCOUNTER
She sent in glucose readings. As below. All these readings are around 4-6 pm. I had asked her to stagger her reading times:   Day 1 check before breakfast.   Day 2 check before lunch  Day 3 check before dinner and so on. Need to have some readings at other times of the day as well. Continue same meds for now and send me readings again in a month checking as above.

## 2022-12-21 ENCOUNTER — HOSPITAL ENCOUNTER (OUTPATIENT)
Dept: ULTRASOUND IMAGING | Age: 87
Discharge: HOME | End: 2022-12-21
Attending: SURGERY
Payer: MEDICARE

## 2022-12-21 DIAGNOSIS — I65.23 BILATERAL CAROTID ARTERY STENOSIS: ICD-10-CM

## 2022-12-21 PROCEDURE — 93880 EXTRACRANIAL BILAT STUDY: CPT | Performed by: SURGERY

## 2023-01-06 ENCOUNTER — TELEPHONE (OUTPATIENT)
Dept: INTERNAL MEDICINE CLINIC | Facility: CLINIC | Age: 88
End: 2023-01-06

## 2023-01-08 DIAGNOSIS — E11.8 DIABETES MELLITUS TYPE 2 WITH COMPLICATIONS (HCC): ICD-10-CM

## 2023-01-09 DIAGNOSIS — E11.8 DIABETES MELLITUS TYPE 2 WITH COMPLICATIONS (HCC): ICD-10-CM

## 2023-01-09 RX ORDER — GLIMEPIRIDE 1 MG/1
1 TABLET ORAL 2 TIMES DAILY
Qty: 180 TABLET | Refills: 0 | Status: SHIPPED | OUTPATIENT
Start: 2023-01-09

## 2023-01-09 RX ORDER — METFORMIN HYDROCHLORIDE 500 MG/1
500 TABLET, EXTENDED RELEASE ORAL
Qty: 90 TABLET | Refills: 0 | Status: SHIPPED | OUTPATIENT
Start: 2023-01-09

## 2023-01-09 NOTE — TELEPHONE ENCOUNTER
Future Appointments   Date Time Provider Kevin Estelita   5/11/2023 10:00 AM Austin Mai MD [facility] [place]

## 2023-01-09 NOTE — TELEPHONE ENCOUNTER
Readings look okay for now when taking her age in account as well. Continue same meds. Do labs as recommended in May and see me in May as planned. Please inform pt. Readings sent to scan.

## 2023-01-09 NOTE — TELEPHONE ENCOUNTER
Left message to call back       Future Appointments   Date Time Provider Kevin Etselita   7/10/2023 10:30 AM Britton Lim, DO [facility] Pulm [facility] [employer], please call the pt also to make appt, thank you   Please make appt for May

## 2023-01-20 ENCOUNTER — MED REC SCAN ONLY (OUTPATIENT)
Dept: INTERNAL MEDICINE CLINIC | Facility: CLINIC | Age: 88
End: 2023-01-20

## 2023-01-25 ENCOUNTER — OFFICE VISIT (OUTPATIENT)
Dept: SURGERY | Facility: CLINIC | Age: 88
End: 2023-01-25
Payer: MEDICARE

## 2023-01-25 VITALS
BODY MASS INDEX: 28 KG/M2 | HEART RATE: 78 BPM | DIASTOLIC BLOOD PRESSURE: 68 MMHG | HEIGHT: 64 IN | SYSTOLIC BLOOD PRESSURE: 116 MMHG | WEIGHT: 164 LBS

## 2023-01-25 DIAGNOSIS — R20.0 HAND NUMBNESS: ICD-10-CM

## 2023-01-25 DIAGNOSIS — M54.16 LUMBAR RADICULOPATHY: Primary | ICD-10-CM

## 2023-01-25 DIAGNOSIS — Z98.890 S/P LUMBAR DISCECTOMY: ICD-10-CM

## 2023-01-25 PROCEDURE — 99212 OFFICE O/P EST SF 10 MIN: CPT | Performed by: PHYSICIAN ASSISTANT

## 2023-01-25 PROCEDURE — 3078F DIAST BP <80 MM HG: CPT | Performed by: PHYSICIAN ASSISTANT

## 2023-01-25 PROCEDURE — 3008F BODY MASS INDEX DOCD: CPT | Performed by: PHYSICIAN ASSISTANT

## 2023-01-25 PROCEDURE — 3074F SYST BP LT 130 MM HG: CPT | Performed by: PHYSICIAN ASSISTANT

## 2023-01-25 RX ORDER — GABAPENTIN 100 MG/1
CAPSULE ORAL
Qty: 60 CAPSULE | Refills: 0 | Status: SHIPPED | OUTPATIENT
Start: 2023-01-25

## 2023-02-10 ENCOUNTER — TELEPHONE (OUTPATIENT)
Dept: INTERNAL MEDICINE CLINIC | Facility: CLINIC | Age: 88
End: 2023-02-10

## 2023-02-10 DIAGNOSIS — R20.0 HAND NUMBNESS: ICD-10-CM

## 2023-02-10 DIAGNOSIS — M54.16 LUMBAR RADICULOPATHY: ICD-10-CM

## 2023-02-10 DIAGNOSIS — Z98.890 S/P LUMBAR MICRODISCECTOMY: Primary | ICD-10-CM

## 2023-02-10 NOTE — TELEPHONE ENCOUNTER
Dr Sera Fowler referred patient to Dr Danuta Woodruff office. His PA ordered two MRIs (lumbar back and cervical spine)  Insurance did not approve the MRIs. She said her insurance is requiring Dr Ilia Wong approval and will call the office. Her MRIs are now scheduled for Feb 15th. Please let the patient know the outcome.

## 2023-02-14 NOTE — TELEPHONE ENCOUNTER
Okay to order, please write in the comments that this order is based on recommendation from neurology. Thanks.

## 2023-02-15 ENCOUNTER — TELEPHONE (OUTPATIENT)
Dept: SURGERY | Facility: CLINIC | Age: 88
End: 2023-02-15

## 2023-02-15 NOTE — TELEPHONE ENCOUNTER
Roxanne Castleman calling from [place] prior authorization regarding denial for MRI.  Roxanne Castleman requesting we contact [facility] at [phone] to schedule a peer to peer for MRI approval.

## 2023-02-16 NOTE — TELEPHONE ENCOUNTER
Our office has not yet received a denial.    Reviewed referral notes, no mention of denial yet. Called # to obtain additional information and to inform them we will call to schedule P2P once denial and reason for denial is received by our office. Was on hold for 15m with no response. Call was terminated.

## 2023-02-22 ENCOUNTER — TELEPHONE (OUTPATIENT)
Dept: ADMINISTRATIVE | Facility: HOSPITAL | Age: 88
End: 2023-02-22

## 2023-02-22 NOTE — TELEPHONE ENCOUNTER
Good Morning, Please be advise that the 2 Procedures MRI SPINE LUMBAR (CPT=72148) and MRI SPINE CERVICAL (CPT=72141) was Denied by the Patient Health Plan. According to [facility] Dr. Foster Moise can call and initiate a Appeal. [facility]#[phone] [facility]#[id number]      Thanks,  Omar Manzanares    Denial Reason:    Notice of Denial of Medical Coverage   Date: 2/14/2023 Member number: [id number]  Name: Srinivasa Tilley  Your request was denied  Joseph Cabello denied the medical services/items listed below requested by you or your provider:  Procedure Description Units   Requested  Units   Denied  09683 Magnetic Resonance Imaging (MRI)   Cervical Spine without Contrast, a special   picture study of the neck portion of the spine   without injected dye  1 1  63067 Magnetic Resonance Imaging (MRI)  Lumbar Spine without Contrast, a special   picture study of the lower part of the spine   without injected dye  1 1  Why did we deny your request?  We denied the medical services/items listed above because: For 90477 Magnetic Resonance Imaging (MRI) Cervical Spine without Contrast, a special   picture study of the neck portion of the spine without injected dye Based on Medicare National   Coverage Determinations (NCD): 220.2 Magnetic Resonance Imaging and eviCore Spine   Imaging Guidelines Section(s): SP 3.1 Neck (Cervical Spine) Pain without and with   Neurological Features (Including Stenosis) and 1.0 General Guidelines, we cannot approve this   request. Your healthcare provider told us that you have changes in the feeling in your arms   and/or hand(s). The request cannot be approved because:  Imaging requires six weeks of provider directed treatment to be completed. Supported treatments include (but are not limited to) drugs for swelling or pain, an in office workout (physical   therapy), and/or oral or injected steroids.  This must have been completed in the past three

## 2023-02-23 NOTE — TELEPHONE ENCOUNTER
Gaye Watson calling from Dr. Acevedo Hearing office requesting to speak to someone in our office regarding MRI denial. Gaye Watson states Ashley Pryor asked PCP Dr. Hilario Dolan to order MRI so that it would be authorized. Gaye Watson states the MRI was denied and she is hoping to speak to Giorgio Cisneros to discuss what the next steps. Gaye Watson is requesting a call back from Giorgio Cisneros to discuss at [phone].

## 2023-02-27 NOTE — TELEPHONE ENCOUNTER
Pt calling to inform MRI has been denied pt states can not afford to get MRI without insurance please advise

## 2023-02-28 NOTE — TELEPHONE ENCOUNTER
Noted the providers feedback and routed to the Prior Auth team to inquire if the denial letter was received.

## 2023-02-28 NOTE — TELEPHONE ENCOUNTER
I believe the problem is that the PCP order these test and those are the ones that denied and we can not see there denials. From what I see we never received an denial and I can not check this online because with this insurance if we did not start it we can not get any information. I will send an email to Ang to see if they can get a denial. But again if ours does get denied it might be because a denial is already out there.

## 2023-03-02 NOTE — TELEPHONE ENCOUNTER
Message below noted,     Appeal can not be done by our providers since the denial is from the PCP orders. We will need to wait until we receive the official denial for our imaging orders.

## 2023-03-13 NOTE — TELEPHONE ENCOUNTER
Received appeal decision.    This has been approved at [facility] from 3/10/23-9/10/23 approval #[id number]    MRI cervical 35889 and MRI Lumbar 63488    Called patient and gave her the above information she will call to schedule

## 2023-03-17 ENCOUNTER — HOSPITAL ENCOUNTER (OUTPATIENT)
Dept: MRI IMAGING | Facility: HOSPITAL | Age: 88
Discharge: HOME | End: 2023-03-17
Attending: PHYSICIAN ASSISTANT
Payer: MEDICARE

## 2023-03-17 DIAGNOSIS — R20.0 HAND NUMBNESS: ICD-10-CM

## 2023-03-17 DIAGNOSIS — Z98.890 S/P LUMBAR DISCECTOMY: ICD-10-CM

## 2023-03-17 DIAGNOSIS — M54.16 LUMBAR RADICULOPATHY: ICD-10-CM

## 2023-03-17 PROCEDURE — 72148 MRI LUMBAR SPINE W/O DYE: CPT | Performed by: PHYSICIAN ASSISTANT

## 2023-03-17 PROCEDURE — 72141 MRI NECK SPINE W/O DYE: CPT | Performed by: PHYSICIAN ASSISTANT

## 2023-03-21 DIAGNOSIS — E11.51 TYPE 2 DIABETES MELLITUS WITH ATHEROSCLEROSIS OF AORTA (HCC): ICD-10-CM

## 2023-03-21 DIAGNOSIS — I70.0 TYPE 2 DIABETES MELLITUS WITH ATHEROSCLEROSIS OF AORTA (HCC): ICD-10-CM

## 2023-03-21 RX ORDER — BLOOD SUGAR DIAGNOSTIC
STRIP MISCELLANEOUS
Qty: 100 STRIP | Refills: 1 | Status: SHIPPED | OUTPATIENT
Start: 2023-03-21

## 2023-03-21 NOTE — TELEPHONE ENCOUNTER
Diabetic Supplies Protocol Passed 03/21/2023 10:11 AM    Appointment in the past 12 or next 3 months      Refilled per protocol.

## 2023-03-24 ENCOUNTER — OFFICE VISIT (OUTPATIENT)
Dept: SURGERY | Facility: CLINIC | Age: 88
End: 2023-03-24
Payer: MEDICARE

## 2023-03-24 VITALS
HEIGHT: 64 IN | WEIGHT: 160 LBS | BODY MASS INDEX: 27.31 KG/M2 | SYSTOLIC BLOOD PRESSURE: 122 MMHG | DIASTOLIC BLOOD PRESSURE: 42 MMHG | HEART RATE: 86 BPM

## 2023-03-24 DIAGNOSIS — M54.16 LUMBAR RADICULOPATHY: Primary | ICD-10-CM

## 2023-03-24 DIAGNOSIS — Z98.890 S/P LUMBAR DISCECTOMY: ICD-10-CM

## 2023-03-24 PROCEDURE — 3008F BODY MASS INDEX DOCD: CPT | Performed by: PHYSICIAN ASSISTANT

## 2023-03-24 PROCEDURE — 99212 OFFICE O/P EST SF 10 MIN: CPT | Performed by: PHYSICIAN ASSISTANT

## 2023-03-24 PROCEDURE — 3074F SYST BP LT 130 MM HG: CPT | Performed by: PHYSICIAN ASSISTANT

## 2023-03-24 PROCEDURE — 3078F DIAST BP <80 MM HG: CPT | Performed by: PHYSICIAN ASSISTANT

## 2023-03-24 RX ORDER — PREGABALIN 50 MG/1
CAPSULE ORAL
Qty: 60 CAPSULE | Refills: 0 | Status: SHIPPED | OUTPATIENT
Start: 2023-03-24

## 2023-03-24 NOTE — PROGRESS NOTES
Established patient:  Reason for follow up: review MRI     Numeric Rating Scale:     Pain at Present:  8/10       Distribution of Pain:    right    Most recent treatments for Current Pain Condition:   NSAIDS  Response to treatment: no relief    New imaging or testing since your last office visit:  MRI Spin Lumbar & Cervical.   3.17.23

## 2023-04-07 DIAGNOSIS — E11.8 DIABETES MELLITUS TYPE 2 WITH COMPLICATIONS (HCC): ICD-10-CM

## 2023-04-07 RX ORDER — GLIMEPIRIDE 1 MG/1
1 TABLET ORAL 2 TIMES DAILY
Qty: 180 TABLET | Refills: 0 | Status: SHIPPED | OUTPATIENT
Start: 2023-04-07

## 2023-04-07 NOTE — TELEPHONE ENCOUNTER
Diabetic Medication Protocol Passed 04/07/2023 12:25 AM   Protocol Details  HgBA1C procedure resulted in past 6 months    Last HgBA1C < 7.5    Microalbumin procedure in past 12 months or taking ACE/ARB    Appointment in past 6 or next 3 months     Future Appointments   Date Time Provider Kevin Capone   4/12/2023 11:15 AM Gerry Vasquez MD [facility] [place]   5/11/2023 10:00 AM Leonie Goldberg MD [facility] [address] [place]   7/10/2023 10:30 AM Britton Lim,  [facility] Pulm [facility] [place]   Refilled per protocol.

## 2023-04-12 ENCOUNTER — OFFICE VISIT (OUTPATIENT)
Dept: PAIN CLINIC | Facility: CLINIC | Age: 88
End: 2023-04-12
Payer: MEDICARE

## 2023-04-12 VITALS — OXYGEN SATURATION: 89 % | SYSTOLIC BLOOD PRESSURE: 138 MMHG | HEART RATE: 76 BPM | DIASTOLIC BLOOD PRESSURE: 62 MMHG

## 2023-04-12 DIAGNOSIS — M54.12 CERVICAL RADICULITIS: ICD-10-CM

## 2023-04-12 DIAGNOSIS — Z98.890 S/P LUMBAR MICRODISCECTOMY: Primary | ICD-10-CM

## 2023-04-12 PROCEDURE — 99215 OFFICE O/P EST HI 40 MIN: CPT | Performed by: ANESTHESIOLOGY

## 2023-04-12 PROCEDURE — 3075F SYST BP GE 130 - 139MM HG: CPT | Performed by: ANESTHESIOLOGY

## 2023-04-12 PROCEDURE — 3078F DIAST BP <80 MM HG: CPT | Performed by: ANESTHESIOLOGY

## 2023-04-13 ENCOUNTER — TELEPHONE (OUTPATIENT)
Dept: PAIN CLINIC | Facility: CLINIC | Age: 88
End: 2023-04-13

## 2023-04-24 ENCOUNTER — TELEPHONE (OUTPATIENT)
Dept: INTERNAL MEDICINE CLINIC | Facility: CLINIC | Age: 88
End: 2023-04-24

## 2023-04-24 NOTE — TELEPHONE ENCOUNTER
Incoming (mail or fax):  fax  Received from:  [facility]  Documentation given to:  Triage preop bin    Date of pre-op appt:  Not scheduled yet  Provider pre-op scheduled with: Not scheduled yet  Surgeon's name:  Dr Apryl Finnegan   Phone #:  [phone], option 2   Fax #:  [phone]  Surgery date:  Pending medical clearance  Procedure/diagnosis:  Right L4 Transforaminal Injection    PSR attempted to schedule a preop with patient per Dr Demi Clemente request.  Left message for patient to return call.

## 2023-04-25 NOTE — TELEPHONE ENCOUNTER
Future Appointments   Date Time Provider Kevin Capone   4/26/2023  9:00 AM [facility]   4/27/2023  7:20 AM Ankush Sinclair MD [facility] 29 [facility] N Tammie Garcia   5/11/2023 10:00 AM Ankush Sinclair MD [facility] 29 [facility] N [place]   7/10/2023 10:30 AM Britton Lim, DO [facility] Pulm [facility] [place]     In Dr pre-op bin

## 2023-04-26 ENCOUNTER — LAB ENCOUNTER (OUTPATIENT)
Dept: LAB | Facility: HOSPITAL | Age: 88
End: 2023-04-26
Attending: INTERNAL MEDICINE
Payer: MEDICARE

## 2023-04-26 DIAGNOSIS — E87.5 HYPERKALEMIA: Primary | ICD-10-CM

## 2023-04-26 DIAGNOSIS — E11.8 DIABETES MELLITUS TYPE 2 WITH COMPLICATIONS (HCC): ICD-10-CM

## 2023-04-26 DIAGNOSIS — M10.9 GOUT, UNSPECIFIED: ICD-10-CM

## 2023-04-26 DIAGNOSIS — I10 ESSENTIAL (PRIMARY) HYPERTENSION: ICD-10-CM

## 2023-04-26 LAB
ALBUMIN SERPL-MCNC: 3.6 G/DL (ref 3.4–5)
ALBUMIN/GLOB SERPL: 0.9 {RATIO} (ref 1–2)
ALP LIVER SERPL-CCNC: 82 U/L
ALT SERPL-CCNC: 23 U/L
ANION GAP SERPL CALC-SCNC: 3 MMOL/L (ref 0–18)
AST SERPL-CCNC: 14 U/L (ref 15–37)
BILIRUB SERPL-MCNC: 0.7 MG/DL (ref 0.1–2)
BUN BLD-MCNC: 39 MG/DL (ref 7–18)
CALCIUM BLD-MCNC: 10.2 MG/DL (ref 8.5–10.1)
CHLORIDE SERPL-SCNC: 117 MMOL/L (ref 98–112)
CHOLEST SERPL-MCNC: 110 MG/DL (ref ?–200)
CO2 SERPL-SCNC: 23 MMOL/L (ref 21–32)
CREAT BLD-MCNC: 1.65 MG/DL
CREAT UR-SCNC: 70.9 MG/DL
EST. AVERAGE GLUCOSE BLD GHB EST-MCNC: 160 MG/DL (ref 68–126)
FASTING PATIENT LIPID ANSWER: YES
FASTING STATUS PATIENT QL REPORTED: YES
GFR SERPLBLD BASED ON 1.73 SQ M-ARVRAT: 30 ML/MIN/1.73M2 (ref 60–?)
GLOBULIN PLAS-MCNC: 3.8 G/DL (ref 2.8–4.4)
GLUCOSE BLD-MCNC: 131 MG/DL (ref 70–99)
HBA1C MFR BLD: 7.2 % (ref ?–5.7)
HDLC SERPL-MCNC: 61 MG/DL (ref 40–59)
LDLC SERPL CALC-MCNC: 32 MG/DL (ref ?–100)
MICROALBUMIN UR-MCNC: 1.41 MG/DL
MICROALBUMIN/CREAT 24H UR-RTO: 19.9 UG/MG (ref ?–30)
NONHDLC SERPL-MCNC: 49 MG/DL (ref ?–130)
OSMOLALITY SERPL CALC.SUM OF ELEC: 307 MOSM/KG (ref 275–295)
POTASSIUM SERPL-SCNC: 5.5 MMOL/L (ref 3.5–5.1)
PROT SERPL-MCNC: 7.4 G/DL (ref 6.4–8.2)
SODIUM SERPL-SCNC: 143 MMOL/L (ref 136–145)
TRIGL SERPL-MCNC: 85 MG/DL (ref 30–149)
URATE SERPL-MCNC: 5.2 MG/DL
VLDLC SERPL CALC-MCNC: 11 MG/DL (ref 0–30)

## 2023-04-26 PROCEDURE — 83036 HEMOGLOBIN GLYCOSYLATED A1C: CPT

## 2023-04-26 PROCEDURE — 82043 UR ALBUMIN QUANTITATIVE: CPT

## 2023-04-26 PROCEDURE — 84550 ASSAY OF BLOOD/URIC ACID: CPT

## 2023-04-26 PROCEDURE — 80061 LIPID PANEL: CPT

## 2023-04-26 PROCEDURE — 36415 COLL VENOUS BLD VENIPUNCTURE: CPT

## 2023-04-26 PROCEDURE — 85025 COMPLETE CBC W/AUTO DIFF WBC: CPT

## 2023-04-26 PROCEDURE — 80053 COMPREHEN METABOLIC PANEL: CPT

## 2023-04-26 PROCEDURE — 82570 ASSAY OF URINE CREATININE: CPT

## 2023-04-26 RX ORDER — SODIUM POLYSTYRENE SULFONATE 15 G/60ML
15 SUSPENSION ORAL; RECTAL ONCE
Qty: 60 ML | Refills: 0 | Status: SHIPPED | OUTPATIENT
Start: 2023-04-26 | End: 2023-04-26

## 2023-04-27 ENCOUNTER — OFFICE VISIT (OUTPATIENT)
Dept: INTERNAL MEDICINE CLINIC | Facility: CLINIC | Age: 88
End: 2023-04-27
Payer: MEDICARE

## 2023-04-27 VITALS
RESPIRATION RATE: 23 BRPM | WEIGHT: 184 LBS | TEMPERATURE: 98 F | SYSTOLIC BLOOD PRESSURE: 138 MMHG | OXYGEN SATURATION: 97 % | DIASTOLIC BLOOD PRESSURE: 64 MMHG | BODY MASS INDEX: 31.41 KG/M2 | HEART RATE: 77 BPM | HEIGHT: 64 IN

## 2023-04-27 DIAGNOSIS — Z01.818 PRE-OP EVALUATION: Primary | ICD-10-CM

## 2023-04-27 DIAGNOSIS — M54.41 CHRONIC RIGHT-SIDED LOW BACK PAIN WITH RIGHT-SIDED SCIATICA: ICD-10-CM

## 2023-04-27 DIAGNOSIS — J43.9 PULMONARY EMPHYSEMA, UNSPECIFIED EMPHYSEMA TYPE (HCC): ICD-10-CM

## 2023-04-27 DIAGNOSIS — I10 ESSENTIAL (PRIMARY) HYPERTENSION: ICD-10-CM

## 2023-04-27 DIAGNOSIS — E11.8 DIABETES MELLITUS TYPE 2 WITH COMPLICATIONS (HCC): ICD-10-CM

## 2023-04-27 DIAGNOSIS — N18.4 CKD (CHRONIC KIDNEY DISEASE) STAGE 4, GFR 15-29 ML/MIN (HCC): Chronic | ICD-10-CM

## 2023-04-27 DIAGNOSIS — E21.0 HYPERPARATHYROIDISM, PRIMARY (HCC): ICD-10-CM

## 2023-04-27 DIAGNOSIS — I35.0 AORTIC STENOSIS, MILD: ICD-10-CM

## 2023-04-27 DIAGNOSIS — I77.9 BILATERAL CAROTID ARTERY DISEASE, UNSPECIFIED TYPE (HCC): ICD-10-CM

## 2023-04-27 DIAGNOSIS — Z86.73 HISTORY OF STROKE: ICD-10-CM

## 2023-04-27 DIAGNOSIS — G89.29 CHRONIC RIGHT-SIDED LOW BACK PAIN WITH RIGHT-SIDED SCIATICA: ICD-10-CM

## 2023-04-27 DIAGNOSIS — G47.33 OSA (OBSTRUCTIVE SLEEP APNEA): ICD-10-CM

## 2023-04-27 DIAGNOSIS — E87.5 HYPERKALEMIA: Primary | ICD-10-CM

## 2023-04-27 PROBLEM — R06.00 DYSPNEA: Status: ACTIVE | Noted: 2022-05-04

## 2023-04-27 LAB
BASOPHILS # BLD AUTO: 0.1 X10(3) UL (ref 0–0.2)
BASOPHILS NFR BLD AUTO: 1.2 %
EOSINOPHIL # BLD AUTO: 0.44 X10(3) UL (ref 0–0.7)
EOSINOPHIL NFR BLD AUTO: 5.4 %
ERYTHROCYTE [DISTWIDTH] IN BLOOD BY AUTOMATED COUNT: 14.2 %
HCT VFR BLD AUTO: 39.5 %
HGB BLD-MCNC: 12.5 G/DL
IMM GRANULOCYTES # BLD AUTO: 0.04 X10(3) UL (ref 0–1)
IMM GRANULOCYTES NFR BLD: 0.5 %
LYMPHOCYTES # BLD AUTO: 1.9 X10(3) UL (ref 1–4)
LYMPHOCYTES NFR BLD AUTO: 23.2 %
MCH RBC QN AUTO: 34.2 PG (ref 26–34)
MCHC RBC AUTO-ENTMCNC: 31.6 G/DL (ref 31–37)
MCV RBC AUTO: 108.2 FL
MONOCYTES # BLD AUTO: 0.73 X10(3) UL (ref 0.1–1)
MONOCYTES NFR BLD AUTO: 8.9 %
MORPHOLOGY: NORMAL
NEUTROPHILS # BLD AUTO: 4.99 X10 (3) UL (ref 1.5–7.7)
NEUTROPHILS # BLD AUTO: 4.99 X10(3) UL (ref 1.5–7.7)
NEUTROPHILS NFR BLD AUTO: 60.8 %
PLATELET # BLD AUTO: 203 10(3)UL (ref 150–450)
RBC # BLD AUTO: 3.65 X10(6)UL
WBC # BLD AUTO: 8.2 X10(3) UL (ref 4–11)

## 2023-04-27 PROCEDURE — 3008F BODY MASS INDEX DOCD: CPT | Performed by: INTERNAL MEDICINE

## 2023-04-27 PROCEDURE — 99214 OFFICE O/P EST MOD 30 MIN: CPT | Performed by: INTERNAL MEDICINE

## 2023-04-27 PROCEDURE — 3078F DIAST BP <80 MM HG: CPT | Performed by: INTERNAL MEDICINE

## 2023-04-27 PROCEDURE — 3075F SYST BP GE 130 - 139MM HG: CPT | Performed by: INTERNAL MEDICINE

## 2023-04-27 NOTE — PATIENT INSTRUCTIONS
Diabetic meds: take the glimepiride the morning of the day before surgery. Hold it the night surgery and the morning of the day of surgery. You can resume it once you resume a regular diet. Hold the metformin the day of surgery. Hold the aspirin for 7 days prior to surgery per your surgeon's recommendations.

## 2023-04-28 ENCOUNTER — LAB ENCOUNTER (OUTPATIENT)
Dept: LAB | Facility: HOSPITAL | Age: 88
End: 2023-04-28
Attending: INTERNAL MEDICINE
Payer: MEDICARE

## 2023-04-28 DIAGNOSIS — E87.5 HYPERKALEMIA: ICD-10-CM

## 2023-04-28 LAB — POTASSIUM SERPL-SCNC: 4.7 MMOL/L (ref 3.5–5.1)

## 2023-04-28 PROCEDURE — 84132 ASSAY OF SERUM POTASSIUM: CPT

## 2023-04-28 PROCEDURE — 36415 COLL VENOUS BLD VENIPUNCTURE: CPT

## 2023-05-11 ENCOUNTER — OFFICE VISIT (OUTPATIENT)
Dept: INTERNAL MEDICINE CLINIC | Facility: CLINIC | Age: 88
End: 2023-05-11
Payer: MEDICARE

## 2023-05-11 ENCOUNTER — APPOINTMENT (OUTPATIENT)
Dept: GENERAL RADIOLOGY | Facility: HOSPITAL | Age: 88
End: 2023-05-11
Attending: ANESTHESIOLOGY
Payer: MEDICARE

## 2023-05-11 ENCOUNTER — HOSPITAL ENCOUNTER (OUTPATIENT)
Facility: HOSPITAL | Age: 88
Setting detail: HOSPITAL OUTPATIENT SURGERY
Discharge: HOME | End: 2023-05-11
Attending: ANESTHESIOLOGY | Admitting: ANESTHESIOLOGY
Payer: MEDICARE

## 2023-05-11 VITALS
HEIGHT: 64 IN | TEMPERATURE: 99 F | WEIGHT: 184 LBS | OXYGEN SATURATION: 92 % | HEART RATE: 80 BPM | RESPIRATION RATE: 16 BRPM | DIASTOLIC BLOOD PRESSURE: 62 MMHG | SYSTOLIC BLOOD PRESSURE: 148 MMHG | BODY MASS INDEX: 31.41 KG/M2

## 2023-05-11 VITALS
WEIGHT: 160 LBS | HEIGHT: 64 IN | TEMPERATURE: 98 F | BODY MASS INDEX: 27.31 KG/M2 | OXYGEN SATURATION: 92 % | DIASTOLIC BLOOD PRESSURE: 68 MMHG | RESPIRATION RATE: 20 BRPM | SYSTOLIC BLOOD PRESSURE: 126 MMHG | HEART RATE: 72 BPM

## 2023-05-11 DIAGNOSIS — I77.9 BILATERAL CAROTID ARTERY DISEASE, UNSPECIFIED TYPE (HCC): ICD-10-CM

## 2023-05-11 DIAGNOSIS — Z87.39 HISTORY OF GOUT: ICD-10-CM

## 2023-05-11 DIAGNOSIS — N18.4 CKD (CHRONIC KIDNEY DISEASE) STAGE 4, GFR 15-29 ML/MIN (HCC): Chronic | ICD-10-CM

## 2023-05-11 DIAGNOSIS — J47.9 BRONCHIECTASIS WITHOUT COMPLICATION (HCC): ICD-10-CM

## 2023-05-11 DIAGNOSIS — I35.0 AORTIC STENOSIS, MILD: ICD-10-CM

## 2023-05-11 DIAGNOSIS — E11.8 DIABETES MELLITUS TYPE 2 WITH COMPLICATIONS (HCC): ICD-10-CM

## 2023-05-11 DIAGNOSIS — G47.33 OSA (OBSTRUCTIVE SLEEP APNEA): ICD-10-CM

## 2023-05-11 DIAGNOSIS — Z98.890 S/P LUMBAR MICRODISCECTOMY: ICD-10-CM

## 2023-05-11 DIAGNOSIS — E11.42 DIABETIC POLYNEUROPATHY ASSOCIATED WITH TYPE 2 DIABETES MELLITUS (HCC): ICD-10-CM

## 2023-05-11 DIAGNOSIS — Z00.00 ENCOUNTER FOR ANNUAL HEALTH EXAMINATION: Primary | ICD-10-CM

## 2023-05-11 DIAGNOSIS — J43.9 PULMONARY EMPHYSEMA, UNSPECIFIED EMPHYSEMA TYPE (HCC): ICD-10-CM

## 2023-05-11 DIAGNOSIS — E21.0 HYPERPARATHYROIDISM, PRIMARY (HCC): ICD-10-CM

## 2023-05-11 DIAGNOSIS — Z86.73 HISTORY OF STROKE: ICD-10-CM

## 2023-05-11 DIAGNOSIS — F33.0 MILD RECURRENT MAJOR DEPRESSION (HCC): Chronic | ICD-10-CM

## 2023-05-11 DIAGNOSIS — N25.81 SECONDARY HYPERPARATHYROIDISM (HCC): ICD-10-CM

## 2023-05-11 DIAGNOSIS — I10 ESSENTIAL HYPERTENSION: ICD-10-CM

## 2023-05-11 DIAGNOSIS — R06.00 DYSPNEA, UNSPECIFIED TYPE: ICD-10-CM

## 2023-05-11 LAB — GLUCOSE BLD-MCNC: 168 MG/DL (ref 70–99)

## 2023-05-11 PROCEDURE — 3008F BODY MASS INDEX DOCD: CPT | Performed by: ANESTHESIOLOGY

## 2023-05-11 PROCEDURE — 64483 NJX AA&/STRD TFRM EPI L/S 1: CPT | Performed by: ANESTHESIOLOGY

## 2023-05-11 PROCEDURE — 3077F SYST BP >= 140 MM HG: CPT | Performed by: ANESTHESIOLOGY

## 2023-05-11 PROCEDURE — 1159F MED LIST DOCD IN RCRD: CPT | Performed by: ANESTHESIOLOGY

## 2023-05-11 PROCEDURE — 3E0R33Z INTRODUCTION OF ANTI-INFLAMMATORY INTO SPINAL CANAL, PERCUTANEOUS APPROACH: ICD-10-PCS | Performed by: ANESTHESIOLOGY

## 2023-05-11 PROCEDURE — 3078F DIAST BP <80 MM HG: CPT | Performed by: ANESTHESIOLOGY

## 2023-05-11 RX ORDER — NICOTINE POLACRILEX 4 MG
30 LOZENGE BUCCAL
Status: DISCONTINUED | OUTPATIENT
Start: 2023-05-11 | End: 2023-05-11

## 2023-05-11 RX ORDER — INSULIN ASPART 100 [IU]/ML
3 INJECTION, SOLUTION INTRAVENOUS; SUBCUTANEOUS ONCE
Status: DISCONTINUED | OUTPATIENT
Start: 2023-05-11 | End: 2023-05-11

## 2023-05-11 RX ORDER — LIDOCAINE HYDROCHLORIDE 10 MG/ML
INJECTION, SOLUTION EPIDURAL; INFILTRATION; INTRACAUDAL; PERINEURAL
Status: DISCONTINUED | OUTPATIENT
Start: 2023-05-11 | End: 2023-05-11

## 2023-05-11 RX ORDER — SODIUM CHLORIDE 9 MG/ML
INJECTION INTRAVENOUS
Status: DISCONTINUED | OUTPATIENT
Start: 2023-05-11 | End: 2023-05-11

## 2023-05-11 RX ORDER — DEXTROSE MONOHYDRATE 25 G/50ML
50 INJECTION, SOLUTION INTRAVENOUS
Status: DISCONTINUED | OUTPATIENT
Start: 2023-05-11 | End: 2023-05-11

## 2023-05-11 RX ORDER — NICOTINE POLACRILEX 4 MG
15 LOZENGE BUCCAL
Status: DISCONTINUED | OUTPATIENT
Start: 2023-05-11 | End: 2023-05-11

## 2023-05-11 RX ORDER — DEXAMETHASONE SODIUM PHOSPHATE 10 MG/ML
INJECTION, SOLUTION INTRAMUSCULAR; INTRAVENOUS
Status: DISCONTINUED | OUTPATIENT
Start: 2023-05-11 | End: 2023-05-11

## 2023-05-11 NOTE — OPERATIVE REPORT
[facility]  Operative Report  2023     Lurdes Khanna Patient Status:  Hospital Outpatient Surgery    1935 MRN [id number]   Location [address] [address] Attending Ana King MD   Hosp Day # 0 PCP Demar Florian MD     Indication: Leo Todd is a 80year old female with lumbar radiculopathy. Preoperative Diagnosis:  Lumbar radiculopathy [M54.16]    Postoperative Diagnosis: Same as above. Procedure performed: RIGHT LUMBAR 4 TRANSFORAMINAL EPIDURAL STEROID INJECTION SINGLE LEVEL with local     Anesthesia: Local     EBL: Less than 1 ml. Procedure Description:   After reviewing the patient's history and performing a focused physical examination, the diagnosis was confirmed and contraindications such as infection and coagulopathy were ruled out. Following review of potential side effects and complications, including but not necessarily limited to infection, allergic reaction, local tissue breakdown, nerve injury, and paresis, the patient indicated they understood and agreed to proceed. After obtaining the informed consent, the patient was brought to the procedure room and monitored. In the prone position, following sterile prep and drape of the lumbar region, the L4 neural foramen was identified under fluoroscopy. The skin and subcutaneous tissue was anesthetized via 25-gauge 1.5\" needle with approximately 2 cc of 1% lidocaine. A 22-gauge 5\" Quincke spinal needle was introduced toward the inferior aspect of the junction between the transverse process and pedicle of the L4 level atraumatically under fluoroscopic guidance. The needle was advanced into the anterior epidural space at this level. The needle position was confirmed under AP and lateral fluoroscopic view. Following negative aspiration for CSF and blood, 2 cc of normal saline with 10 mg of dexamethasone was injected without complication. The needle was withdrawn with stylet in situ.  The patient tolerated procedure very well. The patient was observed until discharge criteria met. Discharge instructions were given and patient was released to a responsible adult. Complications: None. Follow up: The patient was followed in the pain clinic as needed basis.       Maribel Mendoza MD

## 2023-05-11 NOTE — DISCHARGE INSTRUCTIONS
Home Care Instructions Following Your Pain Procedure     Cherri More,  It has been a pleasure to have you as our patient. To help you at home, you must follow these general discharge instructions. We will review these with you before you are discharged. It is our hope that you have a complete and uneventful recovery from our procedure. General Instructions:  What to Expect:  Bandages from your procedure today can be removed when you get home. Please avoid soaking and/or swimming for 24 hours. Showering is okay  It is normal to have increased pain symptoms and/or pain at injection site for up to 3-5 days after procedure, you can use heat or ice (20 minutes on 20 minutes off) for comfort. You may experience some temporary side effects which may include restlessness or insomnia, flushing of the face, or heart palpitations. Please contact the provider if these symptoms do not resolve within 3-4 days. Lightheadedness or nausea may occur and should resolve within 24 to 48 hours. If you develop a headache after treatment, rest, drink fluids (with caffeine, if possible) and take mild over-the-counter pain medication. If the headache does not improve with the above treatment, contact the physician. Home Medications:  Resume all previously prescribed medication. Please avoid taking NSAIDs (Non-Steriodal Anti-Inflammatory Drugs) such as:  Ibuprofen ( Advil, Motrin) Aleve (Naproxen), Diclofenac, Meloxicam for 6 hours after procedure. If you are on Coumadin (Warfarin) or any other anti-coagulant (or \"blood thinning\") medication such as Plavix (Clopidogrel), Xarelto (Rivaroxaban), Eliquis (Apixaban), Effient (Prasugrel) etc., restart on the following day from the procedure unless otherwise directed by your provider. If you are a diabetic, please increase the frequency of your glucose monitoring after the procedure as steroids may cause a temporary (2-3 day) increase in your blood sugar.   Contact your primary care physician if your blood sugar remains elevated as you may require some medication adjustment. Diet:  Resume your regular diet as tolerated. Activity: We recommend that you relax and rest during the rest of your procedure day. If you feel weakness in your arms or legs do not drive. Follow-up Appointment  Please schedule a follow-up visit within 3 to 4 weeks after your last procedure date. Question or Concerns:  Feel free to call our office with any questions or concerns at [phone] (option #2)    Shanna Goode  Thank you for coming to [facility] for your procedure. The nurses try very hard to make sure you receive the best care possible. Your trust in them as well as us is greatly appreciated.     Thanks so much,   Dr. Joe Noguera

## 2023-05-12 ENCOUNTER — TELEPHONE (OUTPATIENT)
Dept: PAIN CLINIC | Facility: CLINIC | Age: 88
End: 2023-05-12

## 2023-05-12 NOTE — TELEPHONE ENCOUNTER
Courtesy called placed to patient for post procedure follow up. Patient stated she is doing much better and has no questions or concerns. Pt verbalized understanding to call with any questions or concerns.       Procedure: RIGHT LUMBAR 4 TRANSFORAMINAL EPIDURAL STEROID INJECTION SINGLE LEVEL with local  Date: 5/11/23  Follow up Visit Scheduled: declined to schedule

## 2023-05-16 ENCOUNTER — HOSPITAL ENCOUNTER (OUTPATIENT)
Dept: CV DIAGNOSTICS | Facility: HOSPITAL | Age: 88
Discharge: HOME | End: 2023-05-16
Attending: INTERNAL MEDICINE
Payer: MEDICARE

## 2023-05-16 DIAGNOSIS — I35.0 AORTIC STENOSIS, MILD: ICD-10-CM

## 2023-05-16 PROCEDURE — 93306 TTE W/DOPPLER COMPLETE: CPT | Performed by: INTERNAL MEDICINE

## 2023-05-31 DIAGNOSIS — I77.9 BILATERAL CAROTID ARTERY DISEASE, UNSPECIFIED TYPE (HCC): ICD-10-CM

## 2023-05-31 DIAGNOSIS — I10 ESSENTIAL (PRIMARY) HYPERTENSION: ICD-10-CM

## 2023-05-31 RX ORDER — METOPROLOL SUCCINATE 25 MG/1
12.5 TABLET, EXTENDED RELEASE ORAL DAILY
Qty: 45 TABLET | Refills: 1 | Status: SHIPPED | OUTPATIENT
Start: 2023-05-31

## 2023-05-31 RX ORDER — ATORVASTATIN CALCIUM 20 MG/1
20 TABLET, FILM COATED ORAL NIGHTLY
Qty: 90 TABLET | Refills: 1 | Status: SHIPPED | OUTPATIENT
Start: 2023-05-31

## 2023-06-27 ENCOUNTER — OFFICE VISIT (OUTPATIENT)
Facility: CLINIC | Age: 88
End: 2023-06-27
Payer: MEDICARE

## 2023-06-27 VITALS
HEART RATE: 64 BPM | RESPIRATION RATE: 16 BRPM | OXYGEN SATURATION: 96 % | SYSTOLIC BLOOD PRESSURE: 138 MMHG | BODY MASS INDEX: 27.66 KG/M2 | DIASTOLIC BLOOD PRESSURE: 72 MMHG | HEIGHT: 64 IN | WEIGHT: 162 LBS

## 2023-06-27 DIAGNOSIS — E11.8 DIABETES MELLITUS TYPE 2 WITH COMPLICATIONS (HCC): ICD-10-CM

## 2023-06-27 DIAGNOSIS — E11.51 TYPE 2 DIABETES MELLITUS WITH ATHEROSCLEROSIS OF AORTA (HCC): ICD-10-CM

## 2023-06-27 DIAGNOSIS — M10.9 GOUT, UNSPECIFIED: ICD-10-CM

## 2023-06-27 DIAGNOSIS — F33.0 MILD RECURRENT MAJOR DEPRESSION (HCC): ICD-10-CM

## 2023-06-27 DIAGNOSIS — I70.0 TYPE 2 DIABETES MELLITUS WITH ATHEROSCLEROSIS OF AORTA (HCC): ICD-10-CM

## 2023-06-27 DIAGNOSIS — J43.2 CENTRILOBULAR EMPHYSEMA (HCC): Primary | ICD-10-CM

## 2023-06-27 PROCEDURE — 3078F DIAST BP <80 MM HG: CPT | Performed by: INTERNAL MEDICINE

## 2023-06-27 PROCEDURE — 1160F RVW MEDS BY RX/DR IN RCRD: CPT | Performed by: INTERNAL MEDICINE

## 2023-06-27 PROCEDURE — 3075F SYST BP GE 130 - 139MM HG: CPT | Performed by: INTERNAL MEDICINE

## 2023-06-27 PROCEDURE — 3008F BODY MASS INDEX DOCD: CPT | Performed by: INTERNAL MEDICINE

## 2023-06-27 PROCEDURE — 1159F MED LIST DOCD IN RCRD: CPT | Performed by: INTERNAL MEDICINE

## 2023-06-27 PROCEDURE — 99204 OFFICE O/P NEW MOD 45 MIN: CPT | Performed by: INTERNAL MEDICINE

## 2023-06-27 RX ORDER — METFORMIN HYDROCHLORIDE EXTENDED-RELEASE TABLETS 500 MG/1
500 TABLET, FILM COATED, EXTENDED RELEASE ORAL
COMMUNITY

## 2023-06-28 RX ORDER — ALLOPURINOL 100 MG/1
200 TABLET ORAL DAILY
Qty: 180 TABLET | Refills: 1 | Status: SHIPPED | OUTPATIENT
Start: 2023-06-28

## 2023-06-28 RX ORDER — BLOOD SUGAR DIAGNOSTIC
STRIP MISCELLANEOUS
Qty: 100 STRIP | Refills: 1 | Status: SHIPPED | OUTPATIENT
Start: 2023-06-28

## 2023-06-28 RX ORDER — GLIMEPIRIDE 1 MG/1
TABLET ORAL
Qty: 180 TABLET | Refills: 0 | Status: SHIPPED | OUTPATIENT
Start: 2023-06-28

## 2023-07-05 ENCOUNTER — MED REC SCAN ONLY (OUTPATIENT)
Facility: CLINIC | Age: 88
End: 2023-07-05

## 2023-07-06 ENCOUNTER — TELEPHONE (OUTPATIENT)
Facility: CLINIC | Age: 88
End: 2023-07-06

## 2023-07-06 NOTE — TELEPHONE ENCOUNTER
Additional information needed for approval of CT chest 62235. Scheduled a peer to peer for [facility] APRN. Elizabeth spoke with Randy Moran MD and CT chest was approved from 7-6-23 through 1-2-24. Approval #: [id number].    Sent message to Central Authorization to notify them of approval.

## 2023-07-08 ENCOUNTER — HOSPITAL ENCOUNTER (OUTPATIENT)
Dept: CT IMAGING | Facility: HOSPITAL | Age: 88
Discharge: HOME | End: 2023-07-08
Attending: INTERNAL MEDICINE
Payer: MEDICARE

## 2023-07-08 DIAGNOSIS — J43.2 CENTRILOBULAR EMPHYSEMA (HCC): ICD-10-CM

## 2023-07-08 PROCEDURE — 71250 CT THORAX DX C-: CPT | Performed by: INTERNAL MEDICINE

## 2023-07-10 ENCOUNTER — OFFICE VISIT (OUTPATIENT)
Facility: CLINIC | Age: 88
End: 2023-07-10
Payer: MEDICARE

## 2023-07-10 VITALS
BODY MASS INDEX: 27.83 KG/M2 | DIASTOLIC BLOOD PRESSURE: 70 MMHG | OXYGEN SATURATION: 91 % | SYSTOLIC BLOOD PRESSURE: 136 MMHG | RESPIRATION RATE: 18 BRPM | WEIGHT: 163 LBS | HEART RATE: 70 BPM | HEIGHT: 64 IN

## 2023-07-10 DIAGNOSIS — I35.0 AORTIC STENOSIS, MILD: ICD-10-CM

## 2023-07-10 DIAGNOSIS — E11.8 DIABETES MELLITUS TYPE 2 WITH COMPLICATIONS (HCC): ICD-10-CM

## 2023-07-10 DIAGNOSIS — G47.33 OSA (OBSTRUCTIVE SLEEP APNEA): Primary | ICD-10-CM

## 2023-07-10 DIAGNOSIS — I77.9 BILATERAL CAROTID ARTERY DISEASE, UNSPECIFIED TYPE (HCC): ICD-10-CM

## 2023-07-10 DIAGNOSIS — I10 ESSENTIAL HYPERTENSION: ICD-10-CM

## 2023-07-13 DIAGNOSIS — E11.8 DIABETES MELLITUS TYPE 2 WITH COMPLICATIONS (HCC): ICD-10-CM

## 2023-07-13 DIAGNOSIS — E11.42 DIABETIC POLYNEUROPATHY ASSOCIATED WITH TYPE 2 DIABETES MELLITUS (HCC): ICD-10-CM

## 2023-07-13 RX ORDER — LANCETS 33 GAUGE
1 EACH MISCELLANEOUS AS NEEDED
Qty: 100 EACH | Refills: 1 | Status: SHIPPED | OUTPATIENT
Start: 2023-07-13

## 2023-07-13 NOTE — TELEPHONE ENCOUNTER
Diabetic Supplies Protocol Gumvez9307/13/2023 11:24 AM    Appointment in the past 12 or next 3 months      Refilled per protocol.

## 2023-07-27 DIAGNOSIS — E11.8 DIABETES MELLITUS TYPE 2 WITH COMPLICATIONS (HCC): ICD-10-CM

## 2023-07-27 DIAGNOSIS — E11.42 DIABETIC POLYNEUROPATHY ASSOCIATED WITH TYPE 2 DIABETES MELLITUS (HCC): ICD-10-CM

## 2023-07-28 RX ORDER — LANCETS 33 GAUGE
1 EACH MISCELLANEOUS AS NEEDED
Qty: 100 EACH | Refills: 1 | Status: SHIPPED | OUTPATIENT
Start: 2023-07-28

## 2023-07-28 NOTE — TELEPHONE ENCOUNTER
Diabetic Supplies Protocol Dwgjwo2307/27/2023 04:34 PM    Appointment in the past 12 or next 3 months   Refilled per protocol.- pharmacy did not receive this refill on 7/13/23.

## 2023-08-25 DIAGNOSIS — E11.8 DIABETES MELLITUS TYPE 2 WITH COMPLICATIONS (HCC): ICD-10-CM

## 2023-08-28 RX ORDER — METFORMIN HYDROCHLORIDE 500 MG/1
500 TABLET, EXTENDED RELEASE ORAL
Qty: 90 TABLET | Refills: 0 | OUTPATIENT
Start: 2023-08-28

## 2023-08-28 NOTE — TELEPHONE ENCOUNTER
8. Diabetes mellitus type 2 with complications ([facility].)  -     Hemoglobin A1C; Future; Expected date: 11/07/2023  Stop metformin. Continue glimepiride. Denied.

## 2023-08-29 RX ORDER — METFORMIN HYDROCHLORIDE EXTENDED-RELEASE TABLETS 500 MG/1
500 TABLET, FILM COATED, EXTENDED RELEASE ORAL
Qty: 90 TABLET | Refills: 0 | Status: SHIPPED | OUTPATIENT
Start: 2023-08-29

## 2023-09-05 ENCOUNTER — TELEPHONE (OUTPATIENT)
Dept: INTERNAL MEDICINE CLINIC | Facility: CLINIC | Age: 88
End: 2023-09-05

## 2023-09-05 DIAGNOSIS — E11.8 DIABETES MELLITUS TYPE 2 WITH COMPLICATIONS (HCC): ICD-10-CM

## 2023-09-05 RX ORDER — METFORMIN HYDROCHLORIDE 500 MG/1
500 TABLET, EXTENDED RELEASE ORAL
Qty: 90 TABLET | Refills: 0 | Status: SHIPPED | OUTPATIENT
Start: 2023-09-05

## 2023-09-05 NOTE — TELEPHONE ENCOUNTER
Received PA request for Metformin ER OSM 500mg, pt previously prescribed metformin 500 ER (not OSM). Is pt to take OSM form? Metformin ER pended, please advise thanks!

## 2023-09-05 NOTE — TELEPHONE ENCOUNTER
Incoming (mail or fax):  fax   Received from:  cover my meds   Documentation given to:  triage in basket     PA metformin

## 2023-09-19 ENCOUNTER — OFFICE VISIT (OUTPATIENT)
Facility: CLINIC | Age: 88
End: 2023-09-19
Payer: MEDICARE

## 2023-09-19 VITALS
BODY MASS INDEX: 27.83 KG/M2 | HEART RATE: 74 BPM | SYSTOLIC BLOOD PRESSURE: 132 MMHG | WEIGHT: 163 LBS | RESPIRATION RATE: 16 BRPM | OXYGEN SATURATION: 92 % | HEIGHT: 64 IN | DIASTOLIC BLOOD PRESSURE: 70 MMHG

## 2023-09-19 DIAGNOSIS — J43.9 PULMONARY EMPHYSEMA, UNSPECIFIED EMPHYSEMA TYPE (HCC): Primary | ICD-10-CM

## 2023-09-19 DIAGNOSIS — G47.33 OSA (OBSTRUCTIVE SLEEP APNEA): ICD-10-CM

## 2023-09-19 DIAGNOSIS — N18.4 CKD (CHRONIC KIDNEY DISEASE) STAGE 4, GFR 15-29 ML/MIN (HCC): Chronic | ICD-10-CM

## 2023-09-19 DIAGNOSIS — I10 ESSENTIAL HYPERTENSION: ICD-10-CM

## 2023-09-19 PROCEDURE — 1160F RVW MEDS BY RX/DR IN RCRD: CPT | Performed by: OTHER

## 2023-09-19 PROCEDURE — 1159F MED LIST DOCD IN RCRD: CPT | Performed by: OTHER

## 2023-09-19 PROCEDURE — 3078F DIAST BP <80 MM HG: CPT | Performed by: OTHER

## 2023-09-19 PROCEDURE — 3008F BODY MASS INDEX DOCD: CPT | Performed by: OTHER

## 2023-09-19 PROCEDURE — 99214 OFFICE O/P EST MOD 30 MIN: CPT | Performed by: OTHER

## 2023-09-19 PROCEDURE — 3075F SYST BP GE 130 - 139MM HG: CPT | Performed by: OTHER

## 2023-09-20 ENCOUNTER — TELEPHONE (OUTPATIENT)
Facility: CLINIC | Age: 88
End: 2023-09-20

## 2023-09-20 DIAGNOSIS — G47.33 OSA (OBSTRUCTIVE SLEEP APNEA): Primary | ICD-10-CM

## 2023-09-25 DIAGNOSIS — E11.8 DIABETES MELLITUS TYPE 2 WITH COMPLICATIONS (HCC): ICD-10-CM

## 2023-09-26 RX ORDER — GLIMEPIRIDE 1 MG/1
TABLET ORAL
Qty: 180 TABLET | Refills: 0 | Status: SHIPPED | OUTPATIENT
Start: 2023-09-26

## 2023-09-26 NOTE — TELEPHONE ENCOUNTER
Diabetic Medication Protocol Gajkgy2009/25/2023 12:26 AM   Protocol Details HgBA1C procedure resulted in past 6 months    Last HgBA1C < 7.5    Microalbumin procedure in past 12 months or taking ACE/ARB    Appointment in past 6 or next 3 months   9. Diabetic polyneuropathy associated with type 2 diabetes mellitus (HCC)  Continue meds.

## 2023-09-27 ENCOUNTER — OFFICE VISIT (OUTPATIENT)
Facility: CLINIC | Age: 88
End: 2023-09-27
Payer: MEDICARE

## 2023-09-27 VITALS
HEART RATE: 83 BPM | SYSTOLIC BLOOD PRESSURE: 110 MMHG | RESPIRATION RATE: 18 BRPM | BODY MASS INDEX: 27.83 KG/M2 | DIASTOLIC BLOOD PRESSURE: 64 MMHG | HEIGHT: 64 IN | OXYGEN SATURATION: 93 % | WEIGHT: 163 LBS

## 2023-09-27 DIAGNOSIS — G47.33 OSA (OBSTRUCTIVE SLEEP APNEA): Primary | ICD-10-CM

## 2023-09-27 DIAGNOSIS — J43.9 PULMONARY EMPHYSEMA, UNSPECIFIED EMPHYSEMA TYPE (HCC): ICD-10-CM

## 2023-09-27 PROCEDURE — 3074F SYST BP LT 130 MM HG: CPT | Performed by: INTERNAL MEDICINE

## 2023-09-27 PROCEDURE — 1160F RVW MEDS BY RX/DR IN RCRD: CPT | Performed by: INTERNAL MEDICINE

## 2023-09-27 PROCEDURE — 99214 OFFICE O/P EST MOD 30 MIN: CPT | Performed by: INTERNAL MEDICINE

## 2023-09-27 PROCEDURE — 3008F BODY MASS INDEX DOCD: CPT | Performed by: INTERNAL MEDICINE

## 2023-09-27 PROCEDURE — 3078F DIAST BP <80 MM HG: CPT | Performed by: INTERNAL MEDICINE

## 2023-09-27 PROCEDURE — 1159F MED LIST DOCD IN RCRD: CPT | Performed by: INTERNAL MEDICINE

## 2023-09-27 NOTE — PATIENT INSTRUCTIONS
plan--             -- to get overnight oximetery -- on the BIPAP and the oxygen              - consider increasing O2 to 4-5 l with your exercise               - vaccines - consider COVID -- ill ask about flu allergy and covid              - see me in 4 months     Fidel Buchanan MD  Pulmonary Medicine  9/27/2023

## 2023-10-18 ENCOUNTER — LAB ENCOUNTER (OUTPATIENT)
Dept: LAB | Facility: HOSPITAL | Age: 88
End: 2023-10-18
Attending: INTERNAL MEDICINE
Payer: MEDICARE

## 2023-10-18 DIAGNOSIS — I10 ESSENTIAL HYPERTENSION: ICD-10-CM

## 2023-10-18 DIAGNOSIS — E11.8 DIABETES MELLITUS TYPE 2 WITH COMPLICATIONS (HCC): ICD-10-CM

## 2023-10-18 DIAGNOSIS — N18.4 CKD (CHRONIC KIDNEY DISEASE) STAGE 4, GFR 15-29 ML/MIN (HCC): Primary | ICD-10-CM

## 2023-10-18 LAB
ALBUMIN SERPL-MCNC: 3.8 G/DL (ref 3.4–5)
ALBUMIN/GLOB SERPL: 1 {RATIO} (ref 1–2)
ALP LIVER SERPL-CCNC: 74 U/L
ALT SERPL-CCNC: 16 U/L
ANION GAP SERPL CALC-SCNC: 7 MMOL/L (ref 0–18)
AST SERPL-CCNC: 5 U/L (ref 15–37)
BILIRUB SERPL-MCNC: 0.7 MG/DL (ref 0.1–2)
BUN BLD-MCNC: 44 MG/DL (ref 7–18)
CALCIUM BLD-MCNC: 10.1 MG/DL (ref 8.5–10.1)
CHLORIDE SERPL-SCNC: 110 MMOL/L (ref 98–112)
CHOLEST SERPL-MCNC: 121 MG/DL (ref ?–200)
CO2 SERPL-SCNC: 21 MMOL/L (ref 21–32)
CREAT BLD-MCNC: 2.02 MG/DL
EGFRCR SERPLBLD CKD-EPI 2021: 23 ML/MIN/1.73M2 (ref 60–?)
EST. AVERAGE GLUCOSE BLD GHB EST-MCNC: 171 MG/DL (ref 68–126)
FASTING PATIENT LIPID ANSWER: YES
FASTING STATUS PATIENT QL REPORTED: YES
GLOBULIN PLAS-MCNC: 3.9 G/DL (ref 2.8–4.4)
GLUCOSE BLD-MCNC: 170 MG/DL (ref 70–99)
HBA1C MFR BLD: 7.6 % (ref ?–5.7)
HDLC SERPL-MCNC: 63 MG/DL (ref 40–59)
LDLC SERPL CALC-MCNC: 38 MG/DL (ref ?–100)
NONHDLC SERPL-MCNC: 58 MG/DL (ref ?–130)
OSMOLALITY SERPL CALC.SUM OF ELEC: 301 MOSM/KG (ref 275–295)
POTASSIUM SERPL-SCNC: 5.1 MMOL/L (ref 3.5–5.1)
PROT SERPL-MCNC: 7.7 G/DL (ref 6.4–8.2)
SODIUM SERPL-SCNC: 138 MMOL/L (ref 136–145)
TRIGL SERPL-MCNC: 110 MG/DL (ref 30–149)
VLDLC SERPL CALC-MCNC: 15 MG/DL (ref 0–30)

## 2023-10-18 PROCEDURE — 80053 COMPREHEN METABOLIC PANEL: CPT

## 2023-10-18 PROCEDURE — 36415 COLL VENOUS BLD VENIPUNCTURE: CPT

## 2023-10-18 PROCEDURE — 83036 HEMOGLOBIN GLYCOSYLATED A1C: CPT

## 2023-10-18 PROCEDURE — 80061 LIPID PANEL: CPT

## 2023-10-21 ENCOUNTER — LAB ENCOUNTER (OUTPATIENT)
Dept: LAB | Facility: HOSPITAL | Age: 88
End: 2023-10-21
Attending: INTERNAL MEDICINE

## 2023-10-21 DIAGNOSIS — N18.4 CKD (CHRONIC KIDNEY DISEASE) STAGE 4, GFR 15-29 ML/MIN (HCC): ICD-10-CM

## 2023-10-21 LAB
ANION GAP SERPL CALC-SCNC: 7 MMOL/L (ref 0–18)
BUN BLD-MCNC: 36 MG/DL (ref 7–18)
CALCIUM BLD-MCNC: 10 MG/DL (ref 8.5–10.1)
CHLORIDE SERPL-SCNC: 112 MMOL/L (ref 98–112)
CO2 SERPL-SCNC: 20 MMOL/L (ref 21–32)
CREAT BLD-MCNC: 1.68 MG/DL
EGFRCR SERPLBLD CKD-EPI 2021: 29 ML/MIN/1.73M2 (ref 60–?)
FASTING STATUS PATIENT QL REPORTED: YES
GLUCOSE BLD-MCNC: 179 MG/DL (ref 70–99)
OSMOLALITY SERPL CALC.SUM OF ELEC: 301 MOSM/KG (ref 275–295)
POTASSIUM SERPL-SCNC: 4.7 MMOL/L (ref 3.5–5.1)
SODIUM SERPL-SCNC: 139 MMOL/L (ref 136–145)

## 2023-10-21 PROCEDURE — 36415 COLL VENOUS BLD VENIPUNCTURE: CPT

## 2023-10-21 PROCEDURE — 80048 BASIC METABOLIC PNL TOTAL CA: CPT

## 2023-10-23 ENCOUNTER — OFFICE VISIT (OUTPATIENT)
Dept: INTERNAL MEDICINE CLINIC | Facility: CLINIC | Age: 88
End: 2023-10-23
Payer: MEDICARE

## 2023-10-23 VITALS
SYSTOLIC BLOOD PRESSURE: 136 MMHG | DIASTOLIC BLOOD PRESSURE: 50 MMHG | TEMPERATURE: 98 F | RESPIRATION RATE: 25 BRPM | HEART RATE: 92 BPM | HEIGHT: 64 IN | BODY MASS INDEX: 27.63 KG/M2 | WEIGHT: 161.81 LBS

## 2023-10-23 DIAGNOSIS — I77.9 BILATERAL CAROTID ARTERY DISEASE, UNSPECIFIED TYPE (HCC): ICD-10-CM

## 2023-10-23 DIAGNOSIS — E11.8 DIABETES MELLITUS TYPE 2 WITH COMPLICATIONS (HCC): ICD-10-CM

## 2023-10-23 DIAGNOSIS — G47.33 OSA (OBSTRUCTIVE SLEEP APNEA): ICD-10-CM

## 2023-10-23 DIAGNOSIS — I35.0 AORTIC STENOSIS, MILD: ICD-10-CM

## 2023-10-23 DIAGNOSIS — I10 ESSENTIAL HYPERTENSION: ICD-10-CM

## 2023-10-23 DIAGNOSIS — E21.0 HYPERPARATHYROIDISM, PRIMARY (HCC): ICD-10-CM

## 2023-10-23 DIAGNOSIS — N18.4 CKD (CHRONIC KIDNEY DISEASE) STAGE 4, GFR 15-29 ML/MIN (HCC): ICD-10-CM

## 2023-10-23 DIAGNOSIS — J43.9 PULMONARY EMPHYSEMA, UNSPECIFIED EMPHYSEMA TYPE (HCC): ICD-10-CM

## 2023-10-23 PROCEDURE — 3075F SYST BP GE 130 - 139MM HG: CPT | Performed by: INTERNAL MEDICINE

## 2023-10-23 PROCEDURE — 99214 OFFICE O/P EST MOD 30 MIN: CPT | Performed by: INTERNAL MEDICINE

## 2023-10-23 PROCEDURE — 1159F MED LIST DOCD IN RCRD: CPT | Performed by: INTERNAL MEDICINE

## 2023-10-23 PROCEDURE — 3078F DIAST BP <80 MM HG: CPT | Performed by: INTERNAL MEDICINE

## 2023-10-23 PROCEDURE — 3008F BODY MASS INDEX DOCD: CPT | Performed by: INTERNAL MEDICINE

## 2023-10-23 RX ORDER — GLIMEPIRIDE 1 MG/1
TABLET ORAL
Qty: 270 TABLET | Refills: 0 | Status: SHIPPED | OUTPATIENT
Start: 2023-10-23

## 2023-10-29 DIAGNOSIS — M10.9 GOUT, UNSPECIFIED: ICD-10-CM

## 2023-10-31 RX ORDER — ALLOPURINOL 100 MG/1
200 TABLET ORAL DAILY
Qty: 180 TABLET | Refills: 1 | OUTPATIENT
Start: 2023-10-31

## 2023-11-03 DIAGNOSIS — F33.0 MILD RECURRENT MAJOR DEPRESSION (HCC): ICD-10-CM

## 2023-11-06 NOTE — TELEPHONE ENCOUNTER
[place] - Vascular Surgery  Follow-up Visit       Subjective:     Chief Complaint/Reason for Clinic Visit: Follow-up to evaluate left upper extremity arteriovenous fistula     History of Present Illness:  65-year-old gentleman underwent left brachiobasilic arteriovenous fistula creation with superficialization on July 5th 2023 presents now for follow-up. He was last seen in office on 8/24/23 was was felt that AVF was ready to be assessed on 8/29/23.      9/18/23:  Patient is in our office today for follow-up after AV fistula use.  Stated nursing staff attempted to access AV fistula 4 times at dialysis unit and was only successfully access 2 times. Once accessed, there was no issues.    11/6/23: Presents because there is difficulty cannulating the AV fistula and that it needs to be superficialized. He denies any other complaints. He reports they have been able to successfully been able to access the AV fistula twice.     Current Outpatient Medications on File Prior to Visit   Medication Sig Dispense Refill    amLODIPine (NORVASC) 10 MG tablet Take 10 mg by mouth once daily.      cetirizine (ZYRTEC) 10 MG tablet Take 10 mg by mouth once daily.      cloNIDine (CATAPRES) 0.2 MG tablet Take 0.2 mg by mouth 3 (three) times daily.      ferrous sulfate (FEOSOL) 325 mg (65 mg iron) Tab tablet Take 325 mg by mouth daily with breakfast.      folic acid/multivit-min/lutein (CENTRUM SILVER ORAL) Take by mouth.      losartan (COZAAR) 100 MG tablet Take 100 mg by mouth once daily.      metoprolol succinate (TOPROL-XL) 50 MG 24 hr tablet Take 50 mg by mouth once daily.      omeprazole (PRILOSEC) 20 MG capsule Take 20 mg by mouth once daily.      pravastatin (PRAVACHOL) 20 MG tablet Take 20 mg by mouth once daily.      tamsulosin (FLOMAX) 0.4 mg Cap Take by mouth once daily.      traMADoL (ULTRAM) 50 mg tablet Take 50 mg by mouth 2 (two) times a day.       No current facility-administered medications on file prior to visit.  Signed records request rec'd from pt for [id number] [facility]. Per Kayla at [id number] [facility], detailed instructions will be faxed. "      Review of patient's allergies indicates:  No Known Allergies  Past Medical History:   Diagnosis Date    Hypertension     Type 2 diabetes mellitus with unspecified diabetic retinopathy without macular edema        Past Surgical History:   Procedure Laterality Date    DIALYSIS FISTULA CREATION  03/17/2023       History reviewed. No pertinent family history.    Social History     Socioeconomic History    Marital status:    Tobacco Use    Smoking status: Former     Types: Cigarettes    Smokeless tobacco: Never   Substance and Sexual Activity    Alcohol use: Not Currently    Drug use: Not Currently       Review of Systems   Constitutional:  Negative for chills, fever and malaise/fatigue.   Cardiovascular:  Negative for chest pain, claudication and leg swelling.   Gastrointestinal:  Negative for abdominal pain, constipation, diarrhea, nausea and vomiting.   Genitourinary:  Negative for dysuria and frequency.   Musculoskeletal:  Negative for back pain and neck pain.       Objective:   BP (!) 175/83 (BP Location: Right arm, Patient Position: Sitting, BP Method: Medium (Automatic))   Pulse 100   Resp 20   Ht 5' 6" (1.676 m)   Wt 72.3 kg (159 lb 8 oz)   SpO2 96%   BMI 25.74 kg/m²     Physical Exam  Constitutional:       General: He is not in acute distress.     Appearance: Normal appearance. He is normal weight.   HENT:      Head: Normocephalic and atraumatic.   Cardiovascular:      Rate and Rhythm: Normal rate and regular rhythm.      Comments: Palpable thrill over the LUE AV fistula   Pulmonary:      Effort: Pulmonary effort is normal.      Breath sounds: Normal breath sounds.   Abdominal:      General: Abdomen is flat. Bowel sounds are normal. There is no distension.      Palpations: Abdomen is soft.   Neurological:      General: No focal deficit present.      Mental Status: He is alert and oriented to person, place, and time. Mental status is at baseline.   Psychiatric:         Mood and Affect: Mood " normal.         Behavior: Behavior normal.         Thought Content: Thought content normal.     Fistula ultrasound (11/6/2023): Flow volumes marginal. There are a few areas of small/strictured areas in the basilic vein.     Assessment/Plan:   65 yo gentleman presents to clinic for follow-up regarding left upper extremity arteriovenous fistula. The flow volumes are marginal. There is however a palpable thrill overlying the arteriovenous fistula.     Discussed with patient to perform a revision with arteriovenous graft versus attempting peritoneal dialysis access. Patient has opted to attempt peritoneal dialysis access.     - Patient to discuss with Dr. No attempting peritoneal dialysis   - Patient to follow-up in clinic as needed  - Answered patient's questions to his satisfaction. Patient expressed understanding of the above plan.     Heriberto Salomon MD  Vascular Surgery

## 2023-11-23 DIAGNOSIS — I77.9 BILATERAL CAROTID ARTERY DISEASE, UNSPECIFIED TYPE (HCC): ICD-10-CM

## 2023-11-27 RX ORDER — ATORVASTATIN CALCIUM 20 MG/1
20 TABLET, FILM COATED ORAL NIGHTLY
Qty: 90 TABLET | Refills: 0 | Status: SHIPPED | OUTPATIENT
Start: 2023-11-27

## 2023-12-06 DIAGNOSIS — I77.9 BILATERAL CAROTID ARTERY DISEASE, UNSPECIFIED TYPE (HCC): ICD-10-CM

## 2023-12-06 DIAGNOSIS — E11.8 DIABETES MELLITUS TYPE 2 WITH COMPLICATIONS (HCC): ICD-10-CM

## 2023-12-11 RX ORDER — METFORMIN HYDROCHLORIDE 500 MG/1
500 TABLET, EXTENDED RELEASE ORAL
Qty: 90 TABLET | Refills: 0 | Status: SHIPPED | OUTPATIENT
Start: 2023-12-11

## 2023-12-11 RX ORDER — ATORVASTATIN CALCIUM 20 MG/1
20 TABLET, FILM COATED ORAL NIGHTLY
Qty: 90 TABLET | Refills: 0 | Status: SHIPPED | OUTPATIENT
Start: 2023-12-11

## 2023-12-11 NOTE — TELEPHONE ENCOUNTER
90 day refill done. Due for an appt in 1/2023, see last note. Please make her an appt for her 3 month med check, should do it now so as to be able to get in on time.

## 2023-12-18 DIAGNOSIS — E11.8 DIABETES MELLITUS TYPE 2 WITH COMPLICATIONS (HCC): ICD-10-CM

## 2023-12-19 RX ORDER — GLIMEPIRIDE 1 MG/1
TABLET ORAL
Qty: 270 TABLET | Refills: 0 | Status: SHIPPED | OUTPATIENT
Start: 2023-12-19

## 2023-12-19 NOTE — TELEPHONE ENCOUNTER
Last OV relevant to medication: 10/23/23  Last refill date:  10/23/23   #/refills: 270/0  When pt was asked to return for OV: Jan 2024  Upcoming appt/reason: diab check  1/5/24  Was pt informed of any over due labs: none  Lab Results   Component Value Date     (H) 10/21/2023    BUN 36 (H) 10/21/2023    BUNCREA 23.5 (H) 06/28/2021    CREATSERUM 1.68 (H) 10/21/2023    ANIONGAP 7 10/21/2023    GFR 28 (L) 04/05/2017    GFRNAA 28 (L) 02/25/2022    GFRAA 33 (L) 02/25/2022    CA 10.0 10/21/2023    OSMOCALC 301 (H) 10/21/2023    ALKPHO 74 10/18/2023    AST 5 (L) 10/18/2023    ALT 16 10/18/2023    BILT 0.7 10/18/2023    TP 7.7 10/18/2023    ALB 3.8 10/18/2023    GLOBULIN 3.9 10/18/2023     10/21/2023    K 4.7 10/21/2023     10/21/2023    CO2 20.0 (L) 10/21/2023       Lab Results   Component Value Date     (H) 10/18/2023    A1C 7.6 (H) 10/18/2023       Lab Results   Component Value Date    MALBP 1.41 04/26/2023    CREUR 70.90 04/26/2023

## 2023-12-31 DIAGNOSIS — I10 ESSENTIAL (PRIMARY) HYPERTENSION: ICD-10-CM

## 2024-01-02 RX ORDER — METOPROLOL SUCCINATE 25 MG/1
TABLET, EXTENDED RELEASE ORAL
Qty: 45 TABLET | Refills: 1 | Status: SHIPPED | OUTPATIENT
Start: 2024-01-02

## 2024-01-24 DIAGNOSIS — I70.0 TYPE 2 DIABETES MELLITUS WITH ATHEROSCLEROSIS OF AORTA (HCC): ICD-10-CM

## 2024-01-24 DIAGNOSIS — E11.51 TYPE 2 DIABETES MELLITUS WITH ATHEROSCLEROSIS OF AORTA (HCC): ICD-10-CM

## 2024-01-25 RX ORDER — BLOOD SUGAR DIAGNOSTIC
STRIP MISCELLANEOUS
Qty: 100 STRIP | Refills: 1 | Status: SHIPPED | OUTPATIENT
Start: 2024-01-25

## 2024-01-30 ENCOUNTER — OFFICE VISIT (OUTPATIENT)
Facility: CLINIC | Age: 89
End: 2024-01-30
Payer: MEDICARE

## 2024-01-30 VITALS
DIASTOLIC BLOOD PRESSURE: 48 MMHG | SYSTOLIC BLOOD PRESSURE: 114 MMHG | WEIGHT: 159 LBS | OXYGEN SATURATION: 96 % | HEIGHT: 64 IN | HEART RATE: 69 BPM | RESPIRATION RATE: 16 BRPM | BODY MASS INDEX: 27.14 KG/M2

## 2024-01-30 DIAGNOSIS — G47.33 OSA (OBSTRUCTIVE SLEEP APNEA): Primary | ICD-10-CM

## 2024-01-30 DIAGNOSIS — R09.02 HYPOXIA: ICD-10-CM

## 2024-01-30 PROCEDURE — 1159F MED LIST DOCD IN RCRD: CPT | Performed by: INTERNAL MEDICINE

## 2024-01-30 PROCEDURE — 1160F RVW MEDS BY RX/DR IN RCRD: CPT | Performed by: INTERNAL MEDICINE

## 2024-01-30 PROCEDURE — 99214 OFFICE O/P EST MOD 30 MIN: CPT | Performed by: INTERNAL MEDICINE

## 2024-01-30 NOTE — PATIENT INSTRUCTIONS
plan--             -- to get overnight oximetery -- on the BIPAP and the oxygen - orbit              -- call me with any changes              - see me in 6 months     Chikisse Mata MD  Pulmonary Medicine  1/30/2024
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
## 2024-02-08 ENCOUNTER — LAB ENCOUNTER (OUTPATIENT)
Dept: LAB | Facility: HOSPITAL | Age: 89
End: 2024-02-08
Attending: INTERNAL MEDICINE
Payer: MEDICARE

## 2024-02-08 DIAGNOSIS — E11.8 DIABETES MELLITUS TYPE 2 WITH COMPLICATIONS (HCC): ICD-10-CM

## 2024-02-08 DIAGNOSIS — N18.4 CKD (CHRONIC KIDNEY DISEASE) STAGE 4, GFR 15-29 ML/MIN (HCC): ICD-10-CM

## 2024-02-08 LAB
ANION GAP SERPL CALC-SCNC: 5 MMOL/L (ref 0–18)
BUN BLD-MCNC: 31 MG/DL (ref 9–23)
CALCIUM BLD-MCNC: 10 MG/DL (ref 8.5–10.1)
CHLORIDE SERPL-SCNC: 114 MMOL/L (ref 98–112)
CO2 SERPL-SCNC: 22 MMOL/L (ref 21–32)
CREAT BLD-MCNC: 1.66 MG/DL
EGFRCR SERPLBLD CKD-EPI 2021: 29 ML/MIN/1.73M2 (ref 60–?)
EST. AVERAGE GLUCOSE BLD GHB EST-MCNC: 166 MG/DL (ref 68–126)
FASTING STATUS PATIENT QL REPORTED: YES
GLUCOSE BLD-MCNC: 126 MG/DL (ref 70–99)
HBA1C MFR BLD: 7.4 % (ref ?–5.7)
OSMOLALITY SERPL CALC.SUM OF ELEC: 300 MOSM/KG (ref 275–295)
POTASSIUM SERPL-SCNC: 5.1 MMOL/L (ref 3.5–5.1)
SODIUM SERPL-SCNC: 141 MMOL/L (ref 136–145)

## 2024-02-08 PROCEDURE — 83036 HEMOGLOBIN GLYCOSYLATED A1C: CPT

## 2024-02-08 PROCEDURE — 80048 BASIC METABOLIC PNL TOTAL CA: CPT

## 2024-02-08 PROCEDURE — 36415 COLL VENOUS BLD VENIPUNCTURE: CPT

## 2024-02-14 ENCOUNTER — OFFICE VISIT (OUTPATIENT)
Dept: INTERNAL MEDICINE CLINIC | Facility: CLINIC | Age: 89
End: 2024-02-14
Payer: MEDICARE

## 2024-02-14 VITALS
BODY MASS INDEX: 27.14 KG/M2 | HEART RATE: 87 BPM | OXYGEN SATURATION: 94 % | DIASTOLIC BLOOD PRESSURE: 68 MMHG | HEIGHT: 64 IN | SYSTOLIC BLOOD PRESSURE: 132 MMHG | WEIGHT: 159 LBS | TEMPERATURE: 97 F

## 2024-02-14 DIAGNOSIS — E11.8 DIABETES MELLITUS TYPE 2 WITH COMPLICATIONS (HCC): Primary | ICD-10-CM

## 2024-02-14 DIAGNOSIS — I10 ESSENTIAL (PRIMARY) HYPERTENSION: ICD-10-CM

## 2024-02-14 PROCEDURE — 99214 OFFICE O/P EST MOD 30 MIN: CPT | Performed by: INTERNAL MEDICINE

## 2024-02-14 RX ORDER — METOPROLOL SUCCINATE 25 MG/1
TABLET, EXTENDED RELEASE ORAL
Qty: 45 TABLET | Refills: 1 | Status: SHIPPED | OUTPATIENT
Start: 2024-02-14

## 2024-02-14 NOTE — PROGRESS NOTES
[facility]    CHIEF COMPLAINT:    Chief Complaint   Patient presents with    Follow - Up     3m f/u diab doing alright          HISTORY OF PRESENT ILLNESS:  here for med check and dm check.   Dm: her A1c is 7.4 now improved from 7.6.   Due for eye exam and foot exam .   Now on glimepiride 1mg in am, 2mg in pm. Also on metformin. No hypoglycemic symptoms that she cannot handle. She usually knows she needs to eat something around 4pm and she does.     Htn: Taking meds regularly. No chest pain, no shortness of breath. Needs refill for metoprolol.     REVIEW OF SYSTEMS:  See HPI    Current Medications:    Current Outpatient Medications   Medication Sig Dispense Refill    metoprolol succinate ER 25 MG Oral Tablet 24 Hr TAKE 0.5 TABLET (12.5 MG TOTAL) BY MOUTH IN THE MORNING 45 tablet 1    ONETOUCH ULTRA In Vitro Strip USE TO TEST BLOOD SUGAR DAILY*E11.51 NON INSULIN 100 strip 1    glimepiride 1 MG Oral Tab TAKE 1 TABLET (1 MG TOTAL) BY MOUTH IN THE MORNING and 2 tablets (2mg total) in the evening. 270 tablet 0    METFORMIN  MG Oral Tablet 24 Hr TAKE 1 TABLET BY MOUTH EVERY DAY WITH BREAKFAST 90 tablet 0    ATORVASTATIN 20 MG Oral Tab TAKE 1 TABLET (20 MG TOTAL) BY MOUTH NIGHTLY. PT NEEDS TO CALL OUR OFFICE FOR FURTHER REFILLS 90 tablet 0    Lancets (ONETOUCH DELICA PLUS [id number]) Does not apply Misc 1 LANCET BY FINGER STICK ROUTE AS NEEDED. AT LEAST DAILY 100 each 1    allopurinol 100 MG Oral Tab Take 2 tablets (200 mg total) by mouth daily. 180 tablet 1    sertraline 50 MG Oral Tab Take 1 tablet (50 mg total) by mouth daily. 90 tablet 1    aspirin 325 MG Oral Tab Take 1 tablet (325 mg total) by mouth daily.         PAST MEDICAL, SOCIAL, AND FAMILY HISTORY:  Tobacco:    History   Smoking Status    Former    Packs/day: 2.00    Years: 40.00    Types: Cigarettes    Quit date: 5/5/1998   Smokeless Tobacco    Never       PHYSICAL EXAM:  /68 (BP Location: Right arm, Patient Position: Sitting, Cuff Size:  adult)   Pulse 87   Temp 96.7 °F (35.9 °C) (Temporal)   Ht 5' 4\" (1.626 m)   Wt 159 lb (72.1 kg)   LMP  (LMP Unknown)   SpO2 94%   BMI 27.29 kg/m²    GENERAL: well developed, well nourished,in no apparent distress  LUNGS: clear to auscultation  CARDIO: RRR without murmur  Bilateral barefoot skin diabetic exam is normal, visualized feet and the appearance is normal.  Bilateral monofilament/sensation of both feet is normal.  Pulsation pedal pulse exam of both lower legs/feet is normal as well.       DATA:  Results for orders placed or performed in visit on 02/08/24   Hemoglobin A1C [E]   Result Value Ref Range    HgbA1C 7.4 (H) <5.7 %    Estimated Average Glucose 166 (H) 68 - 126 mg/dL   Basic Metabolic Panel (8) [E]   Result Value Ref Range    Glucose 126 (H) 70 - 99 mg/dL    Sodium 141 136 - 145 mmol/L    Potassium 5.1 3.5 - 5.1 mmol/L    Chloride 114 (H) 98 - 112 mmol/L    CO2 22.0 21.0 - 32.0 mmol/L    Anion Gap 5 0 - 18 mmol/L    BUN 31 (H) 9 - 23 mg/dL    Creatinine 1.66 (H) 0.55 - 1.02 mg/dL    Calcium, Total 10.0 8.5 - 10.1 mg/dL    Calculated Osmolality 300 (H) 275 - 295 mOsm/kg    eGFR-Cr 29 (L) >=60 mL/min/1.73m2    Patient Fasting for BMP? Yes             ASSESSMENT AND PLAN:  1. Essential (primary) hypertension  - CMP in 3 months; Future  - Lipid in 3 months; Future  - metoprolol succinate ER 25 MG Oral Tablet 24 Hr; TAKE 0.5 TABLET (12.5 MG TOTAL) BY MOUTH IN THE MORNING  Dispense: 45 tablet; Refill: 1    2. Diabetes mellitus type 2 with complications (HCC)  Continue meds.   Labs in 3 months.   - Hemoglobin A1C in 3 months; Future  - Microalb/Creat Ratio, Random Urine in 3 months; Future  - OPHTHALMOLOGY - INTERNAL        Return in about 3 months (around 5/14/2024) for supervisit.      Constanza Mendez MD

## 2024-02-21 DIAGNOSIS — F33.0 MILD RECURRENT MAJOR DEPRESSION (HCC): ICD-10-CM

## 2024-02-21 NOTE — TELEPHONE ENCOUNTER
Psychiatric Non-Scheduled (Anti-Anxiety) [id number]/21/2024 04:37 PM   Protocol Details In person appointment or virtual visit in the past 6 mos or appointment in next 3 mos    Depression Screening completed within the past 12 months        Future Appointments   Date Time Provider Department Center   5/23/2024 11:20 AM Constanza Mendez MD [facility] [place]   7/30/2024 11:00 AM Chikis August MD [facility] [place]

## 2024-03-06 DIAGNOSIS — M10.9 GOUT, UNSPECIFIED: ICD-10-CM

## 2024-03-06 DIAGNOSIS — E11.42 DIABETIC POLYNEUROPATHY ASSOCIATED WITH TYPE 2 DIABETES MELLITUS (HCC): ICD-10-CM

## 2024-03-06 DIAGNOSIS — E11.8 DIABETES MELLITUS TYPE 2 WITH COMPLICATIONS (HCC): ICD-10-CM

## 2024-03-06 RX ORDER — LANCETS 33 GAUGE
1 EACH MISCELLANEOUS AS NEEDED
Qty: 100 EACH | Refills: 1 | Status: SHIPPED | OUTPATIENT
Start: 2024-03-06

## 2024-03-06 NOTE — TELEPHONE ENCOUNTER
Last OV relevant to medication: 2/14/24  Last refill date: 6/28/23 #180/refills: 1  When pt was asked to return for OV: 5/14/24  Upcoming appt/reason:   Future Appointments   Date Time Provider Department Center   5/23/2024 11:20 AM Contsanza Mendez MD [facility] [place]   7/30/2024 11:00 AM Chikis August MD [facility] [place]   Was pt informed of any over due labs: due in May  Lab Results   Component Value Date    24VOL 1,450 04/25/2018    URIC 5.2 04/26/2023       Diabetic Supplies Protocol [id number]/06/2024 12:02 PM   Protocol Details In person appointment or virtual visit in the past 12 mos or appointment in next 3 mos

## 2024-03-07 DIAGNOSIS — E11.8 DIABETES MELLITUS TYPE 2 WITH COMPLICATIONS (HCC): ICD-10-CM

## 2024-03-07 RX ORDER — ALLOPURINOL 100 MG/1
200 TABLET ORAL DAILY
Qty: 180 TABLET | Refills: 0 | Status: SHIPPED | OUTPATIENT
Start: 2024-03-07

## 2024-03-07 RX ORDER — METFORMIN HYDROCHLORIDE 500 MG/1
500 TABLET, EXTENDED RELEASE ORAL
Qty: 90 TABLET | Refills: 0 | Status: SHIPPED | OUTPATIENT
Start: 2024-03-07

## 2024-03-07 NOTE — TELEPHONE ENCOUNTER
Last OV relevant to medication: 2/14/24  Last refill date: 12/11/23 #90/refills: 0  When pt was asked to return for OV: 5/14/24  Upcoming appt/reason:   Future Appointments   Date Time Provider Department Center   5/23/2024 11:20 AM Constanza Mendez MD [facility] [place]   7/30/2024 11:00 AM Chikis August MD [facility] [place]   Was pt informed of any over due labs: due in May  Lab Results   Component Value Date     (H) 02/08/2024    A1C 7.4 (H) 02/08/2024

## 2024-03-16 DIAGNOSIS — E11.8 DIABETES MELLITUS TYPE 2 WITH COMPLICATIONS (HCC): ICD-10-CM

## 2024-03-18 RX ORDER — GLIMEPIRIDE 1 MG/1
TABLET ORAL
Qty: 270 TABLET | Refills: 0 | Status: SHIPPED | OUTPATIENT
Start: 2024-03-18

## 2024-06-12 ENCOUNTER — TELEPHONE (OUTPATIENT)
Dept: INTERNAL MEDICINE CLINIC | Facility: CLINIC | Age: 89
End: 2024-06-12

## 2024-06-12 NOTE — TELEPHONE ENCOUNTER
Left message for Pt to call back. Received fax for prescriptions through [facility]. [facility] is not listed as a preferred pharm. Get verbal ok that patient wants Rx sent through [facility].  Form in pending folder in phone room     Also update insurance looks like patient has human[employer] ma

## 2024-06-28 ENCOUNTER — TELEPHONE (OUTPATIENT)
Dept: INTERNAL MEDICINE CLINIC | Facility: CLINIC | Age: 89
End: 2024-06-28

## 2024-06-28 NOTE — TELEPHONE ENCOUNTER
Patient brought a form from her new insurance, [employer], that needs to be completed and faxed.  Would Dr Mendez complete the Verification of Chronic Condition and fax to [phone]?  She is aware there may be a charge.  She does not need a phone call or a copy of the form once it's completed.  Form placed in triage incoming bin.  Please advise.  Thank you!

## 2024-07-01 ENCOUNTER — TELEPHONE (OUTPATIENT)
Dept: INTERNAL MEDICINE CLINIC | Facility: CLINIC | Age: 89
End: 2024-07-01

## 2024-07-01 DIAGNOSIS — M10.9 GOUT, UNSPECIFIED: ICD-10-CM

## 2024-07-01 DIAGNOSIS — F33.0 MILD RECURRENT MAJOR DEPRESSION (HCC): ICD-10-CM

## 2024-07-01 RX ORDER — GLUCOSAM/CHON-MSM1/C/MANG/BOSW 500-416.6
TABLET ORAL
Qty: 100 EACH | Refills: 0 | Status: SHIPPED | OUTPATIENT
Start: 2024-07-01

## 2024-07-01 RX ORDER — CALCIUM CITRATE/VITAMIN D3 200MG-6.25
TABLET ORAL
Qty: 100 STRIP | Refills: 0 | Status: SHIPPED | OUTPATIENT
Start: 2024-07-01

## 2024-07-01 RX ORDER — BLOOD-GLUCOSE METER
1 EACH MISCELLANEOUS DAILY
Qty: 1 EACH | Refills: 0 | COMMUNITY
Start: 2024-07-01 | End: 2025-07-01

## 2024-07-01 NOTE — TELEPHONE ENCOUNTER
Onetouch strips no longer covered by insurance, alternative covered per pharmacy is True Metrix. Kit and supplies sent to pharmacy.

## 2024-07-03 ENCOUNTER — MED REC SCAN ONLY (OUTPATIENT)
Dept: INTERNAL MEDICINE CLINIC | Facility: CLINIC | Age: 89
End: 2024-07-03

## 2024-07-03 RX ORDER — ALLOPURINOL 100 MG/1
200 TABLET ORAL DAILY
Qty: 180 TABLET | Refills: 0 | Status: SHIPPED | OUTPATIENT
Start: 2024-07-03

## 2024-07-03 NOTE — TELEPHONE ENCOUNTER
Last OV relevant to medication: 2/14/24  Last refill date: 2/21/24 #90/refills: 0  When pt was asked to return for OV: 5/14/24  Upcoming appt/reason:   Future Appointments   Date Time Provider Department Center   7/23/2024 11:30 AM Chikis August MD [facility] [place]   7/31/2024 10:40 AM Constanza Mendez MD [facility] [place]   Was pt informed of any over due labs: prior to appointment

## 2024-07-03 NOTE — TELEPHONE ENCOUNTER
Last OV relevant to medication: 2/14/24  Last refill date:3/7/24  180#/refills: 0  When pt was asked to return for OV: 5/14/24  Upcoming appt/reason:   Future Appointments   Date Time Provider Department Center   7/23/2024 11:30 AM Chikis August MD [facility] [place]   7/31/2024 10:40 AM Constanza Mendez MD [facility] [place]   Was pt informed of any over due labs: prior to appointment   Lab Results   Component Value Date    24VOL 1,450 04/25/2018    URIC 5.2 04/26/2023

## 2024-07-19 ENCOUNTER — TELEPHONE (OUTPATIENT)
Dept: INTERNAL MEDICINE CLINIC | Facility: CLINIC | Age: 89
End: 2024-07-19

## 2024-07-19 DIAGNOSIS — J43.9 PULMONARY EMPHYSEMA, UNSPECIFIED EMPHYSEMA TYPE (HCC): Primary | ICD-10-CM

## 2024-07-19 NOTE — TELEPHONE ENCOUNTER
Provider's Name?:  Dr August  Provider's Specialty?:  Pulm  Reason for Visit?:  FU   Diagnosis?:  COPD  Number of Visits Requested?:  3  Last Visit with Specialist?:  1/24  Is Appt. Already Scheduled?:  yes       If so, Date?:  7/23  Does pt need call back?: yes     Pt has [facility] please get referral through them.   Thanks

## 2024-07-22 NOTE — PROGRESS NOTES
Pooja Khanna  2024 - 2024  : 1935  Age: 89 years  [place]  [address]  [address]  [place], [address]  Phone: [phone]  Compliance Report  Usage 2024 - 2024  Usage days 82/90 days (91%)  >= 4 hours 78 days (87%)  < 4 hours 4 days (4%)  Usage hours 523 hours 32 minutes  Average usage (total days) 5 hours 49 minutes  Average usage (days used) 6 hours 23 minutes  Median usage (days used) 6 hours 4 minutes  Total used hours (value since last reset - 2024) 7,298 hours  [facility]  Serial number [id number]  Mode Spont  IPAP 12 cmH2O  EPAP 8 cmH2O  Easy-Breathe On  Therapy  Leaks - L/min Median: 20.7 95th percentile: 65.0 Maximum: 113.1  Events per hour AI: 5.5 HI: 1.3 AHI: 6.8  Apnea Index Central: 1.5 Obstructive: 1.1 Unknown: 2.7

## 2024-07-23 ENCOUNTER — OFFICE VISIT (OUTPATIENT)
Facility: CLINIC | Age: 89
End: 2024-07-23
Payer: MEDICARE

## 2024-07-23 VITALS
HEART RATE: 81 BPM | RESPIRATION RATE: 16 BRPM | DIASTOLIC BLOOD PRESSURE: 62 MMHG | SYSTOLIC BLOOD PRESSURE: 138 MMHG | OXYGEN SATURATION: 94 % | WEIGHT: 157 LBS | HEIGHT: 64 IN | BODY MASS INDEX: 26.8 KG/M2

## 2024-07-23 DIAGNOSIS — R09.02 HYPOXIA: ICD-10-CM

## 2024-07-23 DIAGNOSIS — G47.33 OSA (OBSTRUCTIVE SLEEP APNEA): Primary | ICD-10-CM

## 2024-07-23 PROCEDURE — 99214 OFFICE O/P EST MOD 30 MIN: CPT | Performed by: INTERNAL MEDICINE

## 2024-07-23 PROCEDURE — 1159F MED LIST DOCD IN RCRD: CPT | Performed by: INTERNAL MEDICINE

## 2024-07-23 PROCEDURE — 3078F DIAST BP <80 MM HG: CPT | Performed by: INTERNAL MEDICINE

## 2024-07-23 PROCEDURE — 3075F SYST BP GE 130 - 139MM HG: CPT | Performed by: INTERNAL MEDICINE

## 2024-07-23 PROCEDURE — 3008F BODY MASS INDEX DOCD: CPT | Performed by: INTERNAL MEDICINE

## 2024-07-23 PROCEDURE — 1160F RVW MEDS BY RX/DR IN RCRD: CPT | Performed by: INTERNAL MEDICINE

## 2024-07-23 NOTE — PATIENT INSTRUCTIONS
-plan--             -- to get overnight oximetery -- on the BIPAP and the oxygen - [facility]              - to get mask refit please              - see me in 6 months     Chikis August MD  Pulmonary Medicine  7/23/2024
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
## 2024-07-24 ENCOUNTER — TELEPHONE (OUTPATIENT)
Facility: CLINIC | Age: 89
End: 2024-07-24

## 2024-07-24 DIAGNOSIS — R09.02 HYPOXIA: Primary | ICD-10-CM

## 2024-07-24 NOTE — TELEPHONE ENCOUNTER
Per Dr. August:  Please order overnight oximetry for this patient while on her BiPAP and oxygen please   DME is [facility]  Order electronically sent to [facility].  Called [facility] and they do not provide ovox testing.  Left message for pt that order will be sent to [facility].  [facility] rep notified.

## 2024-07-26 ENCOUNTER — LAB ENCOUNTER (OUTPATIENT)
Dept: LAB | Facility: HOSPITAL | Age: 89
End: 2024-07-26
Attending: INTERNAL MEDICINE
Payer: MEDICARE

## 2024-07-26 DIAGNOSIS — E11.8 DIABETES MELLITUS TYPE 2 WITH COMPLICATIONS (HCC): ICD-10-CM

## 2024-07-26 DIAGNOSIS — I10 ESSENTIAL (PRIMARY) HYPERTENSION: ICD-10-CM

## 2024-07-26 LAB
ALBUMIN SERPL-MCNC: 4.3 G/DL (ref 3.2–4.8)
ALBUMIN/GLOB SERPL: 1.6 {RATIO} (ref 1–2)
ALP LIVER SERPL-CCNC: 74 U/L
ALT SERPL-CCNC: 11 U/L
ANION GAP SERPL CALC-SCNC: 10 MMOL/L (ref 0–18)
AST SERPL-CCNC: 14 U/L (ref ?–34)
BILIRUB SERPL-MCNC: 0.8 MG/DL (ref 0.2–1.1)
BUN BLD-MCNC: 46 MG/DL (ref 9–23)
CALCIUM BLD-MCNC: 10.4 MG/DL (ref 8.7–10.4)
CHLORIDE SERPL-SCNC: 111 MMOL/L (ref 98–112)
CHOLEST SERPL-MCNC: 94 MG/DL (ref ?–200)
CO2 SERPL-SCNC: 20 MMOL/L (ref 21–32)
CREAT BLD-MCNC: 1.83 MG/DL
CREAT UR-SCNC: 96.4 MG/DL
EGFRCR SERPLBLD CKD-EPI 2021: 26 ML/MIN/1.73M2 (ref 60–?)
EST. AVERAGE GLUCOSE BLD GHB EST-MCNC: 157 MG/DL (ref 68–126)
FASTING PATIENT LIPID ANSWER: YES
FASTING STATUS PATIENT QL REPORTED: YES
GLOBULIN PLAS-MCNC: 2.7 G/DL (ref 2–3.5)
GLUCOSE BLD-MCNC: 98 MG/DL (ref 70–99)
HBA1C MFR BLD: 7.1 % (ref ?–5.7)
HDLC SERPL-MCNC: 45 MG/DL (ref 40–59)
LDLC SERPL CALC-MCNC: 36 MG/DL (ref ?–100)
MICROALBUMIN UR-MCNC: 1.3 MG/DL
MICROALBUMIN/CREAT 24H UR-RTO: 13.5 UG/MG (ref ?–30)
NONHDLC SERPL-MCNC: 49 MG/DL (ref ?–130)
OSMOLALITY SERPL CALC.SUM OF ELEC: 304 MOSM/KG (ref 275–295)
POTASSIUM SERPL-SCNC: 5.1 MMOL/L (ref 3.5–5.1)
PROT SERPL-MCNC: 7 G/DL (ref 5.7–8.2)
SODIUM SERPL-SCNC: 141 MMOL/L (ref 136–145)
TRIGL SERPL-MCNC: 57 MG/DL (ref 30–149)
VLDLC SERPL CALC-MCNC: 8 MG/DL (ref 0–30)

## 2024-07-26 PROCEDURE — 83036 HEMOGLOBIN GLYCOSYLATED A1C: CPT

## 2024-07-26 PROCEDURE — 82043 UR ALBUMIN QUANTITATIVE: CPT

## 2024-07-26 PROCEDURE — 36415 COLL VENOUS BLD VENIPUNCTURE: CPT

## 2024-07-26 PROCEDURE — 80053 COMPREHEN METABOLIC PANEL: CPT

## 2024-07-26 PROCEDURE — 80061 LIPID PANEL: CPT

## 2024-07-26 PROCEDURE — 82570 ASSAY OF URINE CREATININE: CPT

## 2024-07-31 ENCOUNTER — OFFICE VISIT (OUTPATIENT)
Dept: INTERNAL MEDICINE CLINIC | Facility: CLINIC | Age: 89
End: 2024-07-31
Payer: MEDICARE

## 2024-07-31 VITALS
HEIGHT: 64 IN | DIASTOLIC BLOOD PRESSURE: 56 MMHG | OXYGEN SATURATION: 91 % | HEART RATE: 65 BPM | BODY MASS INDEX: 26.98 KG/M2 | SYSTOLIC BLOOD PRESSURE: 140 MMHG | WEIGHT: 158 LBS | RESPIRATION RATE: 16 BRPM | TEMPERATURE: 98 F

## 2024-07-31 DIAGNOSIS — I77.9 BILATERAL CAROTID ARTERY DISEASE, UNSPECIFIED TYPE (HCC): ICD-10-CM

## 2024-07-31 DIAGNOSIS — N18.4 CKD (CHRONIC KIDNEY DISEASE) STAGE 4, GFR 15-29 ML/MIN (HCC): ICD-10-CM

## 2024-07-31 DIAGNOSIS — E11.8 DIABETES MELLITUS TYPE 2 WITH COMPLICATIONS (HCC): ICD-10-CM

## 2024-07-31 PROCEDURE — 3008F BODY MASS INDEX DOCD: CPT | Performed by: INTERNAL MEDICINE

## 2024-07-31 PROCEDURE — 3078F DIAST BP <80 MM HG: CPT | Performed by: INTERNAL MEDICINE

## 2024-07-31 PROCEDURE — 99214 OFFICE O/P EST MOD 30 MIN: CPT | Performed by: INTERNAL MEDICINE

## 2024-07-31 PROCEDURE — 1170F FXNL STATUS ASSESSED: CPT | Performed by: INTERNAL MEDICINE

## 2024-07-31 PROCEDURE — 1159F MED LIST DOCD IN RCRD: CPT | Performed by: INTERNAL MEDICINE

## 2024-07-31 PROCEDURE — G2211 COMPLEX E/M VISIT ADD ON: HCPCS | Performed by: INTERNAL MEDICINE

## 2024-07-31 PROCEDURE — 3077F SYST BP >= 140 MM HG: CPT | Performed by: INTERNAL MEDICINE

## 2024-07-31 NOTE — PROGRESS NOTES
[facility]    CHIEF COMPLAINT:    Chief Complaint   Patient presents with    Diabetes     Diabetes: 6/19/17-mammo. 5/23/24-eye exam.  2/14/24-foot exam, 7/26/24 A1c, 7/26/24 Micro/cre            HISTORY OF PRESENT ILLNESS:  here for med check.   Dm: her A1c has improved to 7.1. taking and tolerating glimepiride and metformin. Up to date eye and foot exam.     She had labs done.   Cholesterol controlled.     Creatinine is up more than in the past. She has not seen nephrology in over 10 years.     REVIEW OF SYSTEMS:  See HPI    Current Medications:    Current Outpatient Medications   Medication Sig Dispense Refill    Blood Glucose Monitoring Suppl (TRUE METRIX METER) Does not apply Device 1 Device by Other route daily. 1 each 0    SERTRALINE 50 MG Oral Tab TAKE 1 TABLET(50 MG) BY MOUTH DAILY 90 tablet 0    ALLOPURINOL 100 MG Oral Tab TAKE 2 TABLETS(200 MG) BY MOUTH DAILY 180 tablet 0    Glucose Blood (TRUE METRIX BLOOD GLUCOSE TEST) In Vitro Strip USE TO TEST BLOOD SUGAR DAILY. 100 strip 0    TRUEplus Lancets 33G Does not apply Misc 1 lancet by Finger stick route as needed. AT LEAST DAILY 100 each 0    GLIMEPIRIDE 1 MG Oral Tab TAKE 1 TABLET (1 MG TOTAL) BY MOUTH IN THE MORNING AND 2 TABLETS (2MG TOTAL) IN THE EVENING. 270 tablet 0    metFORMIN  MG Oral Tablet 24 Hr Take 1 tablet (500 mg total) by mouth daily with breakfast. 90 tablet 0    Lancets (ONETOUCH DELICA PLUS [id number]) Does not apply Misc 1 lancet  by Finger stick route as needed. AT LEAST DAILY 100 each 1    metoprolol succinate ER 25 MG Oral Tablet 24 Hr TAKE 0.5 TABLET (12.5 MG TOTAL) BY MOUTH IN THE MORNING 45 tablet 1    ONETOUCH ULTRA In Vitro Strip USE TO TEST BLOOD SUGAR DAILY*E11.51 NON INSULIN 100 strip 1    ATORVASTATIN 20 MG Oral Tab TAKE 1 TABLET (20 MG TOTAL) BY MOUTH NIGHTLY. PT NEEDS TO CALL OUR OFFICE FOR FURTHER REFILLS 90 tablet 0    aspirin 325 MG Oral Tab Take 1 tablet (325 mg total) by mouth daily.         PAST MEDICAL,  SOCIAL, AND FAMILY HISTORY:  Tobacco:    History   Smoking Status    Former    Types: Cigarettes   Smokeless Tobacco    Never       PHYSICAL EXAM:  /56   Pulse 65   Temp 98.2 °F (36.8 °C)   Resp 16   Ht 5' 4\" (1.626 m)   Wt 158 lb (71.7 kg)   LMP  (LMP Unknown)   SpO2 91%   BMI 27.12 kg/m²    GENERAL: well developed, well nourished,in no apparent distress  LUNGS: clear to auscultation  CARDIO: RRR without murmur  MUSCULOSKELETAL:  no edema, muscle strength normal.     DATA:  Results for orders placed or performed in visit on 07/26/24   CMP in 3 months   Result Value Ref Range    Glucose 98 70 - 99 mg/dL    Sodium 141 136 - 145 mmol/L    Potassium 5.1 3.5 - 5.1 mmol/L    Chloride 111 98 - 112 mmol/L    CO2 20.0 (L) 21.0 - 32.0 mmol/L    Anion Gap 10 0 - 18 mmol/L    BUN 46 (H) 9 - 23 mg/dL    Creatinine 1.83 (H) 0.55 - 1.02 mg/dL    Calcium, Total 10.4 8.7 - 10.4 mg/dL    Calculated Osmolality 304 (H) 275 - 295 mOsm/kg    eGFR-Cr 26 (L) >=60 mL/min/1.73m2    AST 14 <34 U/L    ALT 11 10 - 49 U/L    Alkaline Phosphatase 74 55 - 142 U/L    Bilirubin, Total 0.8 0.2 - 1.1 mg/dL    Total Protein 7.0 5.7 - 8.2 g/dL    Albumin 4.3 3.2 - 4.8 g/dL    Globulin  2.7 2.0 - 3.5 g/dL    A/G Ratio 1.6 1.0 - 2.0    Patient Fasting for CMP? Yes    Lipid in 3 months   Result Value Ref Range    Cholesterol, Total 94 <200 mg/dL    HDL Cholesterol 45 40 - 59 mg/dL    Triglycerides 57 30 - 149 mg/dL    LDL Cholesterol 36 <100 mg/dL    VLDL 8 0 - 30 mg/dL    Non HDL Chol 49 <130 mg/dL    Patient Fasting for Lipid? Yes    Hemoglobin A1C in 3 months   Result Value Ref Range    HgbA1C 7.1 (H) <5.7 %    Estimated Average Glucose 157 (H) 68 - 126 mg/dL   Microalb/Creat Ratio, Random Urine in 3 months   Result Value Ref Range    Microalbumin, Urine 1.30 mg/dL    Creatinine Ur Random 96.40 mg/dL    Malb/Cre Calc 13.5 <=30.0 ug/mg            ASSESSMENT AND PLAN:  1. Diabetes mellitus type 2 with complications (HCC)  Continue meds.    Up to date eye and foot exam.     2. CKD (chronic kidney disease) stage 4, GFR 15-29 ml/min (HCC)  She is staying hydrated.   Referral to nephrology.   - Nephrology Referral - In Network    3. Bilateral carotid artery disease, unspecified type (HCC)  Continue statin.         Return in about 1 month (around 8/31/2024) for supervisit.      Constanza Mendez MD

## 2024-08-07 ENCOUNTER — NURSE ONLY (OUTPATIENT)
Facility: CLINIC | Age: 89
End: 2024-08-07
Payer: MEDICARE

## 2024-08-07 PROCEDURE — 94660 CPAP INITIATION&MGMT: CPT | Performed by: INTERNAL MEDICINE

## 2024-08-07 NOTE — PROGRESS NOTES
Reviewed functionality of bipap machine.  It appears that pt was attaching cpap tuibing incorrectly.  She had the ends transposed.  I showed her how to properly attach the cpap hose.   She demonstrated how she put her AIRFIT F20 full face mask (medium) and she was also doing this incorrectly.   I showed patient on how to properly put her cpap mask on.   I also  fitted patient with [employer] Vitera medium FFM.   Pt was able to demonstrate on how to properly put on  VITERA FFM and how to adjust the headgear.  Pt instructed  on proper cleaning and maintenance of VITERA FFM.   Instructed pt to update the DME company with the type of mask pt will be using so that DME will send  the right mask in pt's  next cpap re supply shipment.  Pt verbalized understanding of all instructions.

## 2024-09-13 ENCOUNTER — TELEPHONE (OUTPATIENT)
Facility: CLINIC | Age: 89
End: 2024-09-13

## 2024-09-13 NOTE — TELEPHONE ENCOUNTER
Received a call back from Kendall with [employer].  She spoke to [employer] and they had the tracking # of the OVOX. They traced the tracking # and found that the OVOX delivery is in route.  The tracking # confirmed that the package was picked up yesterday.    Called patient to inform her of the above information. Patient did not answer, VOICEMAIL message left.   Nothing further is needed. Closing encounter.

## 2024-09-13 NOTE — TELEPHONE ENCOUNTER
Received a fax from [facility] stating that patients OVOX was delivered to the patient over 20 days ago.  In the fax, [facility] states they have reached out to the patient to retrieve the device and have been unsuccessful reaching the patient.     When I spoke with Pooja she states she completed the testing and mailed the equipment back in the envelope that was provided.   She states she mailed it back approximately 1 week ago.     She states that she received notification that the post office could not deliver it. She was told that it was \"an incomplete address on the envelope they sent with it.\"   When asked where it was now, she stated \" at the post office somewhere\"  Reached out to Kendall, the [facility] representative and [facility] be [facility].   Kendall stated she will call me back.   Waiting call back from Kendall.

## 2024-09-13 NOTE — TELEPHONE ENCOUNTER
Pooja returned my call. I informed her of the status update on her OVOX. Pooja verbalized understanding and nothing further needed at this time.

## 2024-09-16 DIAGNOSIS — I77.9 BILATERAL CAROTID ARTERY DISEASE, UNSPECIFIED TYPE (HCC): ICD-10-CM

## 2024-09-16 RX ORDER — ATORVASTATIN CALCIUM 20 MG/1
20 TABLET, FILM COATED ORAL NIGHTLY
Qty: 90 TABLET | Refills: 1 | Status: SHIPPED | OUTPATIENT
Start: 2024-09-16

## 2024-09-17 ENCOUNTER — OFFICE VISIT (OUTPATIENT)
Dept: NEPHROLOGY | Facility: CLINIC | Age: 89
End: 2024-09-17
Payer: MEDICARE

## 2024-09-17 ENCOUNTER — TELEPHONE (OUTPATIENT)
Dept: NEPHROLOGY | Facility: CLINIC | Age: 89
End: 2024-09-17

## 2024-09-17 VITALS — BODY MASS INDEX: 27 KG/M2 | WEIGHT: 157.13 LBS | DIASTOLIC BLOOD PRESSURE: 74 MMHG | SYSTOLIC BLOOD PRESSURE: 146 MMHG

## 2024-09-17 DIAGNOSIS — E79.0 HYPERURICEMIA: ICD-10-CM

## 2024-09-17 DIAGNOSIS — N18.4 CKD (CHRONIC KIDNEY DISEASE) STAGE 4, GFR 15-29 ML/MIN (HCC): Primary | ICD-10-CM

## 2024-09-17 DIAGNOSIS — I10 PRIMARY HYPERTENSION: ICD-10-CM

## 2024-09-17 PROCEDURE — 99204 OFFICE O/P NEW MOD 45 MIN: CPT | Performed by: INTERNAL MEDICINE
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
## 2024-10-22 ENCOUNTER — TELEPHONE (OUTPATIENT)
Dept: INTERNAL MEDICINE CLINIC | Facility: CLINIC | Age: 89
End: 2024-10-22

## 2024-10-22 NOTE — TELEPHONE ENCOUNTER
Incoming (mail or fax):  fax  Received from:  [facility]  Documentation given to:  triage in    Chronic condition form

## 2024-10-22 NOTE — TELEPHONE ENCOUNTER
Signed. She didn't sign the release portion. Please confirm she is okay with us sending this info to her insurance.

## 2024-10-23 NOTE — TELEPHONE ENCOUNTER
Patient called and said she is ok with the release of information. Said its ok if you need to call her back to verify.

## 2024-11-20 ENCOUNTER — MED REC SCAN ONLY (OUTPATIENT)
Facility: CLINIC | Age: 89
End: 2024-11-20

## 2024-12-03 DIAGNOSIS — M10.9 GOUT, UNSPECIFIED: ICD-10-CM

## 2024-12-03 DIAGNOSIS — E11.8 DIABETES MELLITUS TYPE 2 WITH COMPLICATIONS (HCC): ICD-10-CM

## 2024-12-03 DIAGNOSIS — F33.0 MILD RECURRENT MAJOR DEPRESSION (HCC): ICD-10-CM

## 2024-12-04 RX ORDER — ALLOPURINOL 100 MG/1
200 TABLET ORAL DAILY
Qty: 180 TABLET | Refills: 0 | Status: SHIPPED | OUTPATIENT
Start: 2024-12-04

## 2024-12-04 RX ORDER — GLIMEPIRIDE 1 MG/1
TABLET ORAL
Qty: 270 TABLET | Refills: 0 | Status: SHIPPED | OUTPATIENT
Start: 2024-12-04

## 2024-12-04 NOTE — TELEPHONE ENCOUNTER
Last OV relevant to medication: 7/31/24  Last refill date: 7/3/24 #90/180/refills: 1  When pt was asked to return for OV: 8/31/24  Upcoming appt/reason:   Future Appointments   Date Time Provider Department Center   1/23/2025 11:00 AM Chikis August MD [facility]   Was pt informed of any over due labs: utd    Please call to schedule supervisit, thanks!

## 2024-12-04 NOTE — TELEPHONE ENCOUNTER
Last OV relevant to medication: 7/31/24  Last refill date: 3/18/24 #270/refills: 0  When pt was asked to return for OV: 8/31/24  Upcoming appt/reason:   Future Appointments   Date Time Provider Department Center   1/23/2025 11:00 AM Chikis August MD [facility]   Was pt informed of any over due labs: utd  Lab Results   Component Value Date     (H) 07/26/2024    A1C 7.1 (H) 07/26/2024

## 2025-01-12 DIAGNOSIS — E11.8 DIABETES MELLITUS TYPE 2 WITH COMPLICATIONS (HCC): ICD-10-CM

## 2025-01-14 RX ORDER — METFORMIN HYDROCHLORIDE 500 MG/1
500 TABLET, EXTENDED RELEASE ORAL
Qty: 90 TABLET | Refills: 0 | Status: SHIPPED | OUTPATIENT
Start: 2025-01-14

## 2025-01-14 NOTE — TELEPHONE ENCOUNTER
Last OV relevant to medication: 7/31/24  Last refill date: 3/7/24 #90/refills: 0  When pt was asked to return for OV: 8/31/24 for sv   Upcoming appt/reason:   Future Appointments   Date Time Provider Department Center   1/23/2025 11:00 AM Chikis August MD [facility]   Was pt informed of any over due labs: utd  Lab Results   Component Value Date     (H) 07/26/2024    A1C 7.1 (H) 07/26/2024     Pt needs supervisit, please call to schedule thanks!

## 2025-01-22 NOTE — PROGRESS NOTES
Pooja Khanna  10/24/2024 - 2025  : 1935  Age: 89 years  [place]  [address]  [address]  [place], [address]  Phone: [phone]  Compliance Report  Usage 10/24/2024 - 2025  Usage days 81/90 days (90%)  >= 4 hours 80 days (89%)  < 4 hours 1 days (1%)  Usage hours 579 hours 28 minutes  Average usage (total days) 6 hours 26 minutes  Average usage (days used) 7 hours 9 minutes  Median usage (days used) 7 hours 2 minutes  Total used hours (value since last reset - 2025) 8,542 hours  [employer] [facility]  Serial number [id number]  Mode Spont  IPAP 12 cmH2O  EPAP 8 cmH2O  Easy-Breathe On  Therapy  Leaks - L/min Median: 6.6 95th percentile: 32.4 Maximum: 110.1  Events per hour AI: 1.5 HI: 0.8 AHI: 2.3  Apnea Index Central: 0.2 Obstructive: 0.7 Unknown: 0.5

## 2025-01-23 ENCOUNTER — OFFICE VISIT (OUTPATIENT)
Facility: CLINIC | Age: OVER 89
End: 2025-01-23
Payer: MEDICARE

## 2025-01-23 ENCOUNTER — TELEPHONE (OUTPATIENT)
Dept: INTERNAL MEDICINE CLINIC | Facility: CLINIC | Age: OVER 89
End: 2025-01-23

## 2025-01-23 VITALS
RESPIRATION RATE: 16 BRPM | OXYGEN SATURATION: 86 % | DIASTOLIC BLOOD PRESSURE: 50 MMHG | BODY MASS INDEX: 25.44 KG/M2 | HEIGHT: 64 IN | HEART RATE: 86 BPM | SYSTOLIC BLOOD PRESSURE: 128 MMHG | WEIGHT: 149 LBS

## 2025-01-23 DIAGNOSIS — I35.0 NONRHEUMATIC AORTIC VALVE STENOSIS: Primary | ICD-10-CM

## 2025-01-23 DIAGNOSIS — G47.33 OSA (OBSTRUCTIVE SLEEP APNEA): ICD-10-CM

## 2025-01-23 DIAGNOSIS — R09.02 HYPOXIA: ICD-10-CM

## 2025-01-23 DIAGNOSIS — J43.9 PULMONARY EMPHYSEMA, UNSPECIFIED EMPHYSEMA TYPE (HCC): Primary | ICD-10-CM

## 2025-01-23 PROCEDURE — 3074F SYST BP LT 130 MM HG: CPT | Performed by: INTERNAL MEDICINE

## 2025-01-23 PROCEDURE — 3078F DIAST BP <80 MM HG: CPT | Performed by: INTERNAL MEDICINE

## 2025-01-23 PROCEDURE — 99214 OFFICE O/P EST MOD 30 MIN: CPT | Performed by: INTERNAL MEDICINE

## 2025-01-23 PROCEDURE — 1160F RVW MEDS BY RX/DR IN RCRD: CPT | Performed by: INTERNAL MEDICINE

## 2025-01-23 PROCEDURE — 3008F BODY MASS INDEX DOCD: CPT | Performed by: INTERNAL MEDICINE

## 2025-01-23 PROCEDURE — 1159F MED LIST DOCD IN RCRD: CPT | Performed by: INTERNAL MEDICINE

## 2025-01-23 NOTE — TELEPHONE ENCOUNTER
Isaura from Dr August's office requested referrals.    Insurance: [employer]  Provider's Name?: Dr August   Provider's Specialty?: Pulmonology    Reason for Visit?: Follow up   Diagnosis?: Emphysema   Number of Visits Requested?: 3    Last Visit with Specialist?: unknown   Is Appt. Already Scheduled?: yes        If so, Date?:  1/23/25  Once referral is approved pt can see referral via [facility]

## 2025-01-23 NOTE — TELEPHONE ENCOUNTER
Rec message from referrals. Authorized.  Lmtcb to notify of above and ask if patient needed faxed.

## 2025-01-23 NOTE — PATIENT INSTRUCTIONS
-plan--             -- consider using O2 while showering             -- we will order pulmonary rehab              -- to get echo - call for results                -- keep up good work with the BIPAP               -- see me in 6 months     Chikis August MD  Pulmonary Medicine  1/23/2025
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
## 2025-01-24 ENCOUNTER — TELEPHONE (OUTPATIENT)
Facility: CLINIC | Age: OVER 89
End: 2025-01-24

## 2025-01-24 NOTE — TELEPHONE ENCOUNTER
LVM informing pt pulmonary rehab order was sent to [facility].  Pt to contact facility if she does not hear form them.  Pt to contact Mata's office with any questions.

## 2025-02-03 ENCOUNTER — ORDER TRANSCRIPTION (OUTPATIENT)
Dept: CARDIAC REHAB | Facility: HOSPITAL | Age: OVER 89
End: 2025-02-03

## 2025-02-03 DIAGNOSIS — J96.10 CHRONIC RESPIRATORY FAILURE, UNSPECIFIED WHETHER WITH HYPOXIA OR HYPERCAPNIA (HCC): Primary | ICD-10-CM

## 2025-02-10 ENCOUNTER — CARDPULM VISIT (OUTPATIENT)
Age: OVER 89
End: 2025-02-10
Attending: INTERNAL MEDICINE
Payer: MEDICARE

## 2025-02-11 ENCOUNTER — APPOINTMENT (OUTPATIENT)
Age: OVER 89
End: 2025-02-11
Attending: INTERNAL MEDICINE
Payer: MEDICARE

## 2025-02-13 ENCOUNTER — APPOINTMENT (OUTPATIENT)
Age: OVER 89
End: 2025-02-13
Attending: INTERNAL MEDICINE
Payer: MEDICARE

## 2025-02-18 ENCOUNTER — APPOINTMENT (OUTPATIENT)
Age: OVER 89
End: 2025-02-18
Attending: INTERNAL MEDICINE
Payer: MEDICARE

## 2025-02-20 ENCOUNTER — APPOINTMENT (OUTPATIENT)
Age: OVER 89
End: 2025-02-20
Attending: INTERNAL MEDICINE
Payer: MEDICARE

## 2025-02-25 ENCOUNTER — CARDPULM VISIT (OUTPATIENT)
Age: OVER 89
End: 2025-02-25
Attending: INTERNAL MEDICINE
Payer: MEDICARE

## 2025-02-25 LAB — GLUCOSE BLD-MCNC: 186 MG/DL (ref 70–99)

## 2025-02-27 ENCOUNTER — MED REC SCAN ONLY (OUTPATIENT)
Facility: CLINIC | Age: OVER 89
End: 2025-02-27

## 2025-02-27 ENCOUNTER — APPOINTMENT (OUTPATIENT)
Age: OVER 89
End: 2025-02-27
Attending: INTERNAL MEDICINE
Payer: MEDICARE

## 2025-03-04 ENCOUNTER — APPOINTMENT (OUTPATIENT)
Age: OVER 89
End: 2025-03-04
Attending: INTERNAL MEDICINE
Payer: MEDICARE

## 2025-03-06 ENCOUNTER — APPOINTMENT (OUTPATIENT)
Age: OVER 89
End: 2025-03-06
Attending: INTERNAL MEDICINE
Payer: MEDICARE

## 2025-03-11 ENCOUNTER — APPOINTMENT (OUTPATIENT)
Dept: GENERAL RADIOLOGY | Facility: HOSPITAL | Age: OVER 89
End: 2025-03-11
Attending: EMERGENCY MEDICINE
Payer: MEDICARE

## 2025-03-11 ENCOUNTER — APPOINTMENT (OUTPATIENT)
Dept: NUCLEAR MEDICINE | Facility: HOSPITAL | Age: OVER 89
End: 2025-03-11
Attending: EMERGENCY MEDICINE
Payer: MEDICARE

## 2025-03-11 ENCOUNTER — APPOINTMENT (OUTPATIENT)
Dept: ULTRASOUND IMAGING | Facility: HOSPITAL | Age: OVER 89
End: 2025-03-11
Attending: EMERGENCY MEDICINE
Payer: MEDICARE

## 2025-03-11 ENCOUNTER — HOSPITAL ENCOUNTER (EMERGENCY)
Facility: HOSPITAL | Age: OVER 89
Discharge: HOME | End: 2025-03-11
Attending: EMERGENCY MEDICINE
Payer: MEDICARE

## 2025-03-11 ENCOUNTER — CARDPULM VISIT (OUTPATIENT)
Age: OVER 89
End: 2025-03-11
Attending: INTERNAL MEDICINE
Payer: MEDICARE

## 2025-03-11 VITALS
TEMPERATURE: 98 F | DIASTOLIC BLOOD PRESSURE: 62 MMHG | OXYGEN SATURATION: 98 % | SYSTOLIC BLOOD PRESSURE: 134 MMHG | HEART RATE: 79 BPM | BODY MASS INDEX: 23.32 KG/M2 | HEIGHT: 65 IN | WEIGHT: 140 LBS | RESPIRATION RATE: 16 BRPM

## 2025-03-11 DIAGNOSIS — R06.09 DOE (DYSPNEA ON EXERTION): Primary | ICD-10-CM

## 2025-03-11 DIAGNOSIS — J96.11 CHRONIC RESPIRATORY FAILURE WITH HYPOXIA (HCC): ICD-10-CM

## 2025-03-11 LAB
ALBUMIN SERPL-MCNC: 4.4 G/DL (ref 3.2–4.8)
ALBUMIN/GLOB SERPL: 1.5 {RATIO} (ref 1–2)
ALP LIVER SERPL-CCNC: 77 U/L
ALT SERPL-CCNC: 14 U/L
ANION GAP SERPL CALC-SCNC: 7 MMOL/L (ref 0–18)
APTT PPP: 26 SECONDS (ref 23–36)
AST SERPL-CCNC: 15 U/L (ref ?–34)
BASOPHILS # BLD AUTO: 0.09 X10(3) UL (ref 0–0.2)
BASOPHILS NFR BLD AUTO: 0.8 %
BILIRUB SERPL-MCNC: 0.7 MG/DL (ref 0.2–1.1)
BUN BLD-MCNC: 42 MG/DL (ref 9–23)
CALCIUM BLD-MCNC: 10.6 MG/DL (ref 8.7–10.6)
CHLORIDE SERPL-SCNC: 110 MMOL/L (ref 98–112)
CO2 SERPL-SCNC: 23 MMOL/L (ref 21–32)
CREAT BLD-MCNC: 1.68 MG/DL
D DIMER PPP FEU-MCNC: 0.91 UG/ML FEU (ref ?–0.89)
EGFRCR SERPLBLD CKD-EPI 2021: 29 ML/MIN/1.73M2 (ref 60–?)
EOSINOPHIL # BLD AUTO: 0.2 X10(3) UL (ref 0–0.7)
EOSINOPHIL NFR BLD AUTO: 1.9 %
ERYTHROCYTE [DISTWIDTH] IN BLOOD BY AUTOMATED COUNT: 13.2 %
GLOBULIN PLAS-MCNC: 3 G/DL (ref 2–3.5)
GLUCOSE BLD-MCNC: 177 MG/DL (ref 70–99)
GLUCOSE BLD-MCNC: 186 MG/DL (ref 70–99)
HCT VFR BLD AUTO: 34.9 %
HGB BLD-MCNC: 11.7 G/DL
IMM GRANULOCYTES # BLD AUTO: 0.04 X10(3) UL (ref 0–1)
IMM GRANULOCYTES NFR BLD: 0.4 %
INR BLD: 1.15 (ref 0.8–1.2)
LYMPHOCYTES # BLD AUTO: 1.76 X10(3) UL (ref 1–4)
LYMPHOCYTES NFR BLD AUTO: 16.5 %
MCH RBC QN AUTO: 35.3 PG (ref 26–34)
MCHC RBC AUTO-ENTMCNC: 33.5 G/DL (ref 31–37)
MCV RBC AUTO: 105.4 FL
MONOCYTES # BLD AUTO: 0.96 X10(3) UL (ref 0.1–1)
MONOCYTES NFR BLD AUTO: 9 %
NEUTROPHILS # BLD AUTO: 7.62 X10 (3) UL (ref 1.5–7.7)
NEUTROPHILS # BLD AUTO: 7.62 X10(3) UL (ref 1.5–7.7)
NEUTROPHILS NFR BLD AUTO: 71.4 %
NT-PROBNP SERPL-MCNC: 76 PG/ML (ref ?–450)
OSMOLALITY SERPL CALC.SUM OF ELEC: 305 MOSM/KG (ref 275–295)
PLATELET # BLD AUTO: 152 10(3)UL (ref 150–450)
POTASSIUM SERPL-SCNC: 5.3 MMOL/L (ref 3.5–5.1)
PROT SERPL-MCNC: 7.4 G/DL (ref 5.7–8.2)
PROTHROMBIN TIME: 14.9 SECONDS (ref 11.6–14.8)
RBC # BLD AUTO: 3.31 X10(6)UL
SODIUM SERPL-SCNC: 140 MMOL/L (ref 136–145)
TROPONIN I SERPL HS-MCNC: 11 NG/L
WBC # BLD AUTO: 10.7 X10(3) UL (ref 4–11)

## 2025-03-11 PROCEDURE — 85025 COMPLETE CBC W/AUTO DIFF WBC: CPT | Performed by: EMERGENCY MEDICINE

## 2025-03-11 PROCEDURE — 85730 THROMBOPLASTIN TIME PARTIAL: CPT | Performed by: EMERGENCY MEDICINE

## 2025-03-11 PROCEDURE — 36415 COLL VENOUS BLD VENIPUNCTURE: CPT

## 2025-03-11 PROCEDURE — 93010 ELECTROCARDIOGRAM REPORT: CPT

## 2025-03-11 PROCEDURE — 93005 ELECTROCARDIOGRAM TRACING: CPT

## 2025-03-11 PROCEDURE — 84484 ASSAY OF TROPONIN QUANT: CPT | Performed by: EMERGENCY MEDICINE

## 2025-03-11 PROCEDURE — 83880 ASSAY OF NATRIURETIC PEPTIDE: CPT | Performed by: EMERGENCY MEDICINE

## 2025-03-11 PROCEDURE — 99285 EMERGENCY DEPT VISIT HI MDM: CPT

## 2025-03-11 PROCEDURE — 71045 X-RAY EXAM CHEST 1 VIEW: CPT | Performed by: EMERGENCY MEDICINE

## 2025-03-11 PROCEDURE — 85610 PROTHROMBIN TIME: CPT | Performed by: EMERGENCY MEDICINE

## 2025-03-11 PROCEDURE — 85379 FIBRIN DEGRADATION QUANT: CPT | Performed by: EMERGENCY MEDICINE

## 2025-03-11 PROCEDURE — 80053 COMPREHEN METABOLIC PANEL: CPT | Performed by: EMERGENCY MEDICINE

## 2025-03-11 PROCEDURE — 93971 EXTREMITY STUDY: CPT | Performed by: EMERGENCY MEDICINE

## 2025-03-11 PROCEDURE — 78582 LUNG VENTILAT&PERFUS IMAGING: CPT | Performed by: EMERGENCY MEDICINE

## 2025-03-11 NOTE — ED QUICK NOTES
Rounding Completed    Plan of Care reviewed. Waiting for Ed Physician to discuss imaging and results with Pulmonology   .  Elimination needs assessed.    Bed is locked and in lowest position. Call light within reach.
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
## 2025-03-11 NOTE — CARDIAC REHAB
Pt arrived to exercise in Pulmonary Rehab today. Has been out sick since 2/25/25. Pt has not seen her pulmonologist since she has been sick. At rest vitals stable, 120/58, Hr 79, Spo2 96, on 3L O2. Pt started to exercise on seated Nustep equipment,  SpO2 decreased to 79%, rechecked with ear probe, confirmed 79%, titrated O2 up to above 90% requiring 8L. Pt could only exercise while seated, 8 min, requiring her to stop, rest. After walking aprox 80 feet, SpO2 returned to 79%. Upon resting once again, SPO2 returned to 93%. Lung enrique clear per Chris RT. Pt instructed to go to the ER to be evaluated. Pt states she is able to drive herself, and that she agreed she needed to be evaluated. Chris accompanied patient to the car.

## 2025-03-11 NOTE — RESPIRATORY THERAPY NOTE
Called the [facility] and confirmed that the patient checked in to be evaluated as directed earlier today. I called and spoke with the patient's niece Corinne. She was on her way to the ER to stay with the patient.

## 2025-03-11 NOTE — ED INITIAL ASSESSMENT (HPI)
Pt here with c/o ESAU and SOB. Saw pulmonology this AM and they called and stated she should come here. Pt is on home o2 3L at baseline. 91% on 3L in triage.

## 2025-03-12 ENCOUNTER — TELEPHONE (OUTPATIENT)
Dept: INTERNAL MEDICINE CLINIC | Facility: CLINIC | Age: OVER 89
End: 2025-03-12

## 2025-03-12 ENCOUNTER — PATIENT OUTREACH (OUTPATIENT)
Dept: CASE MANAGEMENT | Age: OVER 89
End: 2025-03-12

## 2025-03-12 LAB
ATRIAL RATE: 79 BPM
P AXIS: 71 DEGREES
P-R INTERVAL: 152 MS
Q-T INTERVAL: 360 MS
QRS DURATION: 72 MS
QTC CALCULATION (BEZET): 412 MS
R AXIS: 67 DEGREES
T AXIS: 46 DEGREES
VENTRICULAR RATE: 79 BPM

## 2025-03-12 NOTE — PROGRESS NOTES
Transitions of Care Navigation  Discharge Date: 3/11/25  Contact Date: 3/12/2025    Transitions of Care Assessment:  VIN Initial Assessment    General:  Assessment completed with: Patient  Patient Subjective: I'm feeling better, breathing is much better today.  Chief Complaint: HANNA  Verify patient name and  with patient/ caregiver: Yes    Hospital Stay/Discharge:  Tell me what you understand of why you were in the hospital or emergency department: shortness of breath  Prior to leaving the hospital were your Discharge Instructions reviewed with you?: Yes  Did you receive a copy of your written Discharge Instructions?: Yes  What questions do you have about your Discharge Instructions?: none  Do you feel better or worse since you left the hospital or emergency department?: Better    Follow - Up Appointment:  Do you have a follow-up appointment?: No  Are there any barriers to getting to your follow-up appointment?: No    Home Health/DME:  Prior to leaving the hospital was Home Health (HH) arranged for you?: No     Prior to leaving the hospital or emergency department was Durable Medical Equipment (DME), medical supplies, or infusions arranged for you?: No  Are DME/medical supply/infusions needs identified by staff during this assessment?: No     Medications/Diet:  Did any of your medications change, during or after your hospital stay or ED visit?: No  Do you understand what your medications are for and possible side effects?: Yes  Are there any reasons that keep you from taking your medication as prescribed?: No  Any concerns about medication refills?: No    Were you given a different diet per your Discharge Instructions?: No     Questions/Concerns:  Do you have any questions or concerns that have not been discussed?: No       Nursing Interventions: NCM reviewed discharge instructions and when to seek medical attention with the patient. She states that she is feeling better and her breathing is much better. She has  not checked her pulse ox yet today stating that she has to get her pulse ox from another room but states that it was 92% last night on 3L of oxygen. She denied having any fever, n/v/c/d, pain, lightheadedness, HA or any new or worsening symptoms. She understands when to return to the ED. Med review completed. Her blood sugar was 89 this morning. She does not check her bp. She denied having any questions or concerns at this time.     Medications:  Medication Reconciliation:  I am aware of an inpatient discharge within the last 30 days.  The discharge medication list has been reconciled with the patient's current medication list and reviewed by me. See medication list for additions of new medication, and changes to current doses of medications and discontinued medications.  Current Outpatient Medications   Medication Sig Dispense Refill    METFORMIN  MG Oral Tablet 24 Hr TAKE 1 TABLET BY MOUTH EVERY DAY WITH BREAKFAST 90 tablet 0    ALLOPURINOL 100 MG Oral Tab TAKE 2 TABLETS(200 MG) BY MOUTH DAILY 180 tablet 0    SERTRALINE 50 MG Oral Tab TAKE 1 TABLET(50 MG) BY MOUTH DAILY 90 tablet 0    glimepiride 1 MG Oral Tab TAKE 1 TABLET BY MOUTH EVERY MORNING AND 2 TABLETS BY MOUTH EVERY EVENING 270 tablet 0    atorvastatin 20 MG Oral Tab TAKE 1 TABLET BY MOUTH EVERY NIGHT 90 tablet 1    Blood Glucose Monitoring Suppl (TRUE METRIX METER) Does not apply Device 1 Device by Other route daily. 1 each 0    Glucose Blood (TRUE METRIX BLOOD GLUCOSE TEST) In Vitro Strip USE TO TEST BLOOD SUGAR DAILY. 100 strip 0    TRUEplus Lancets 33G Does not apply Misc 1 lancet by Finger stick route as needed. AT LEAST DAILY 100 each 0    Lancets (ONETOUCH DELICA PLUS [id number]) Does not apply Misc 1 lancet  by Finger stick route as needed. AT LEAST DAILY 100 each 1    metoprolol succinate ER 25 MG Oral Tablet 24 Hr TAKE 0.5 TABLET (12.5 MG TOTAL) BY MOUTH IN THE MORNING 45 tablet 1    ONETOUCH ULTRA In Vitro Strip USE TO TEST BLOOD SUGAR  DAILY*E11.51 NON INSULIN 100 strip 1    aspirin 325 MG Oral Tab Take 1 tablet (325 mg total) by mouth daily.           Follow-up Appointments:  Your appointments       Date & Time Appointment Department ([place])    Mar 13, 2025 11:15 AM CDT PULMONARY REHAB PHASE II with [facility] PULM REHAB ROOM 1 Cardiopulmonary Rehabilitation, [facility])        Mar 18, 2025 11:15 AM CDT PULMONARY REHAB PHASE II with [facility] PULM REHAB ROOM 1 Cardiopulmonary Rehabilitation, [facility])        Mar 20, 2025 11:15 AM CDT PULMONARY REHAB PHASE II with [facility] PULM REHAB ROOM 1 Cardiopulmonary Rehabilitation, [facility])        Mar 25, 2025 11:15 AM CDT PULMONARY REHAB PHASE II with [facility] PULM REHAB ROOM [facility]        Mar 27, 2025 11:15 AM CDT PULMONARY REHAB PHASE II with  PULM REHAB ROOM 1 [facility]        Apr 01, 2025 11:15 AM CDT PULMONARY REHAB PHASE II with  PULM REHAB ROOM 1 Cardiopulmonary Rehabilitation, [facility])        Apr 03, 2025 11:15 AM CDT PULMONARY REHAB PHASE II with  PULM REHAB ROOM 1 Cardiopulmonary Rehabilitation, [facility])        Apr 08, 2025 11:15 AM CDT PULMONARY REHAB PHASE II with  PULM REHAB ROOM 1 Cardiopulmonary Rehabilitation, [facility])        Apr 10, 2025 11:15 AM CDT PULMONARY REHAB PHASE II with  PULM REHAB ROOM 1 Cardiopulmonary Rehabilitation, [facility])        Apr 15, 2025 11:15 AM CDT PULMONARY REHAB PHASE II with [facility] PULM REHAB ROOM 1 Cardiopulmonary Rehabilitation, [facility]  [facility])        Apr 17, 2025 11:15 AM CDT PULMONARY REHAB PHASE II with [facility] PULM REHAB ROOM 1 Cardiopulmonary Rehabilitation, [facility])        Apr 22, 2025 11:15 AM CDT PULMONARY REHAB PHASE II with E[facility] PULM REHAB ROOM 1 Cardiopulmonary Rehabilitation, [facility])        Apr 24, 2025 11:15 AM CDT PULMONARY REHAB PHASE II with E[facility] PULM REHAB ROOM 1 Cardiopulmonary Rehabilitation, [facility])        Apr 29, 2025 11:15 AM CDT PULMONARY REHAB PHASE II with EH PULM REHAB ROOM 1 Cardiopulmonary Rehabilitation, [facility])        May 01, 2025 11:15 AM CDT PULMONARY REHAB PHASE II with EH PULM REHAB ROOM 1 Cardiopulmonary Rehabilitation, [facility])        May 06, 2025 11:15 AM CDT PULMONARY REHAB PHASE II with EH PULM REHAB ROOM 1 Cardiopulmonary Rehabilitation, [facility])        May 08, 2025 11:15 AM CDT PULMONARY REHAB PHASE II with EH PULM REHAB ROOM 1 Cardiopulmonary Rehabilitation, [facility])        May 13, 2025 11:15 AM CDT PULMONARY REHAB PHASE II with EH PULM REHAB ROOM 1 Cardiopulmonary Rehabilitation, [facility])        May 15, 2025 11:15 AM CDT PULMONARY REHAB PHASE II with EH PULM REHAB ROOM 1 Cardiopulmonary Rehabilitation, [facility])        May 20, 2025 11:15 AM CDT PULMONARY REHAB PHASE II with EH PULM REHAB ROOM 1 Cardiopulmonary Rehabilitation, [facility])        May 22, 2025 11:15 AM CDT PULMONARY REHAB PHASE II with EH PULM REHAB ROOM 1 [facility]  [place])        Jul 23, 2025 11:00 AM CDT Exam - Established with Chikis August MD [facility] ([facility])              Cardiopulmonary Rehabilitation, [facility]  [address]  [address]  [facility] [address]  [phone] [facility]  [address]  [place] [address]  [phone]            Transitional Care Clinic  Was TCC Ordered: No      Primary Care Provider (If no TCC appointment)  Does patient already have a PCP appointment scheduled? No  Nurse Care Manager Attempted to schedule PCP office ER Follow-up appointment with patient   -If no appointment scheduled: Explain -NCM sent TE to PCP Office for assistance and pt aware that she will receive a return call to schedule.     Specialist  Does the patient have any other follow-up appointment(s) need to be scheduled? No, pt has appts with pulm rehab.        Book By Date: 3/18/2025

## 2025-03-12 NOTE — TELEPHONE ENCOUNTER
Spoke to patient for Transitions of Care call today.  Patient requesting an appt with Dr. Mendez. Per notes, pt is to have extended appt length.  ER Follow-up appointment needed by 3/18/25.  Please assist in scheduling. Thank you    BOOK BY DATE: 3/18/25

## 2025-03-12 NOTE — TELEPHONE ENCOUNTER
Incoming (mail or fax):  fax  Received from:  [facility] [email or web address] ([employer])  Documentation given to:  Triage incoming    [facility] Chronic Condition Verification

## 2025-03-13 ENCOUNTER — APPOINTMENT (OUTPATIENT)
Age: OVER 89
End: 2025-03-13
Attending: INTERNAL MEDICINE
Payer: MEDICARE

## 2025-03-13 NOTE — TELEPHONE ENCOUNTER
Pt stated that she will call in 4/1/25. Pt stated that her ins starts then.Will call back in April for appointment    SEVERE

## 2025-03-18 ENCOUNTER — APPOINTMENT (OUTPATIENT)
Age: OVER 89
End: 2025-03-18
Attending: INTERNAL MEDICINE
Payer: MEDICARE

## 2025-03-20 ENCOUNTER — APPOINTMENT (OUTPATIENT)
Age: OVER 89
End: 2025-03-20
Attending: INTERNAL MEDICINE
Payer: MEDICARE

## 2025-03-25 ENCOUNTER — APPOINTMENT (OUTPATIENT)
Age: OVER 89
End: 2025-03-25
Attending: INTERNAL MEDICINE
Payer: MEDICARE

## 2025-03-27 ENCOUNTER — APPOINTMENT (OUTPATIENT)
Age: OVER 89
End: 2025-03-27
Attending: INTERNAL MEDICINE
Payer: MEDICARE

## 2025-03-27 ENCOUNTER — MED REC SCAN ONLY (OUTPATIENT)
Dept: INTERNAL MEDICINE CLINIC | Facility: CLINIC | Age: OVER 89
End: 2025-03-27

## 2025-03-31 ENCOUNTER — TELEPHONE (OUTPATIENT)
Dept: INTERNAL MEDICINE CLINIC | Facility: CLINIC | Age: OVER 89
End: 2025-03-31

## 2025-03-31 NOTE — TELEPHONE ENCOUNTER
Per note below they are going to try to schedule pt for appointment, can you sign for ? Please advise, thanks!

## 2025-03-31 NOTE — TELEPHONE ENCOUNTER
Incoming (mail or fax):  pt's family dropped off  Received from:  Pt's family   Documentation given to:  Triage in     Pt's family came by to drop off Verification of Chronic Condition form and the bottom section of form needs to be filled out by physician.   Pt had canceled her insurance so will go back into effect 4/1/25. Pt's family member is going to visit pt now and will talk to her about scheduling appointment and will contact us.     Please contact Corinne at [phone] when form completed to .

## 2025-04-01 ENCOUNTER — APPOINTMENT (OUTPATIENT)
Age: OVER 89
End: 2025-04-01
Attending: INTERNAL MEDICINE

## 2025-04-01 NOTE — TELEPHONE ENCOUNTER
Called Corinne , pt's daughter and they will wait for Dr Mendez till Friday. Please notify Corinne when it is ready to be picked up.

## 2025-04-01 NOTE — TELEPHONE ENCOUNTER
I have never seen the patient so can fill out form/sign it once I have seen her. Otherwise, she can wait until Dr. Mendez is back on Friday. Thanks.

## 2025-04-03 ENCOUNTER — APPOINTMENT (OUTPATIENT)
Age: OVER 89
End: 2025-04-03
Attending: INTERNAL MEDICINE

## 2025-04-07 NOTE — TELEPHONE ENCOUNTER
All documentation faxed . Conformation received     Send to scanning   At front for pt's daughter to . Patient's daughter notified.  verbalized understanding

## 2025-04-08 ENCOUNTER — APPOINTMENT (OUTPATIENT)
Age: OVER 89
End: 2025-04-08
Attending: INTERNAL MEDICINE

## 2025-04-09 ENCOUNTER — MED REC SCAN ONLY (OUTPATIENT)
Dept: INTERNAL MEDICINE CLINIC | Facility: CLINIC | Age: OVER 89
End: 2025-04-09

## 2025-04-10 ENCOUNTER — APPOINTMENT (OUTPATIENT)
Age: OVER 89
End: 2025-04-10
Attending: INTERNAL MEDICINE

## 2025-04-15 ENCOUNTER — APPOINTMENT (OUTPATIENT)
Age: OVER 89
End: 2025-04-15
Attending: INTERNAL MEDICINE

## 2025-04-17 ENCOUNTER — APPOINTMENT (OUTPATIENT)
Age: OVER 89
End: 2025-04-17
Attending: INTERNAL MEDICINE

## 2025-04-22 ENCOUNTER — APPOINTMENT (OUTPATIENT)
Age: OVER 89
End: 2025-04-22
Attending: INTERNAL MEDICINE

## 2025-04-24 ENCOUNTER — APPOINTMENT (OUTPATIENT)
Age: OVER 89
End: 2025-04-24
Attending: INTERNAL MEDICINE

## 2025-04-25 DIAGNOSIS — I77.9 BILATERAL CAROTID ARTERY DISEASE, UNSPECIFIED TYPE: ICD-10-CM

## 2025-04-25 RX ORDER — ATORVASTATIN CALCIUM 20 MG/1
20 TABLET, FILM COATED ORAL NIGHTLY
Qty: 90 TABLET | Refills: 0 | Status: SHIPPED | OUTPATIENT
Start: 2025-04-25

## 2025-04-25 NOTE — TELEPHONE ENCOUNTER
Cholesterol Medication Protocol [id number]/25/2025 08:02 AM   Protocol Details ALT < 80    ALT resulted within past year    Lipid panel within past 12 months    In person appointment or virtual visit in the past 12 mos or appointment in next 3 mos    Medication is active on med list   3. Bilateral carotid artery disease, unspecified type (HCC)  Continue statin.   Future Appointments   Date Time Provider Department Center   6/12/2025 10:20 AM Constanza Mendez MD [facility]   7/11/2025 11:00 AM Chikis August MD [facility] Pulm [facility] [place]

## 2025-04-29 ENCOUNTER — APPOINTMENT (OUTPATIENT)
Age: OVER 89
End: 2025-04-29
Attending: INTERNAL MEDICINE

## 2025-04-30 ENCOUNTER — MED REC SCAN ONLY (OUTPATIENT)
Facility: CLINIC | Age: OVER 89
End: 2025-04-30

## 2025-05-01 ENCOUNTER — APPOINTMENT (OUTPATIENT)
Age: OVER 89
End: 2025-05-01
Attending: INTERNAL MEDICINE

## 2025-05-02 ENCOUNTER — HOSPITAL ENCOUNTER (OUTPATIENT)
Dept: CV DIAGNOSTICS | Facility: HOSPITAL | Age: OVER 89
Discharge: HOME | End: 2025-05-02
Attending: INTERNAL MEDICINE
Payer: MEDICARE

## 2025-05-02 DIAGNOSIS — I35.0 NONRHEUMATIC AORTIC VALVE STENOSIS: ICD-10-CM

## 2025-05-02 PROCEDURE — 93306 TTE W/DOPPLER COMPLETE: CPT | Performed by: INTERNAL MEDICINE

## 2025-05-06 ENCOUNTER — APPOINTMENT (OUTPATIENT)
Age: OVER 89
End: 2025-05-06
Attending: INTERNAL MEDICINE

## 2025-05-06 DIAGNOSIS — I10 ESSENTIAL (PRIMARY) HYPERTENSION: ICD-10-CM

## 2025-05-08 ENCOUNTER — APPOINTMENT (OUTPATIENT)
Age: OVER 89
End: 2025-05-08
Attending: INTERNAL MEDICINE

## 2025-05-12 RX ORDER — METOPROLOL SUCCINATE 25 MG/1
12.5 TABLET, EXTENDED RELEASE ORAL EVERY MORNING
Qty: 15 TABLET | Refills: 0 | Status: SHIPPED | OUTPATIENT
Start: 2025-05-12

## 2025-05-12 NOTE — TELEPHONE ENCOUNTER
Last OV relevant to medication: 7/31/24  Last refill date: 2/14     #/refills: 6 mo  When pt was asked to return for OV: 1 mo but never did   Upcoming appt/reason: 6/12  Was pt informed of any over due labs: yes

## 2025-05-13 ENCOUNTER — APPOINTMENT (OUTPATIENT)
Age: OVER 89
End: 2025-05-13
Attending: INTERNAL MEDICINE

## 2025-05-15 ENCOUNTER — APPOINTMENT (OUTPATIENT)
Age: OVER 89
End: 2025-05-15
Attending: INTERNAL MEDICINE

## 2025-05-20 ENCOUNTER — APPOINTMENT (OUTPATIENT)
Age: OVER 89
End: 2025-05-20
Attending: INTERNAL MEDICINE

## 2025-05-22 ENCOUNTER — APPOINTMENT (OUTPATIENT)
Age: OVER 89
End: 2025-05-22
Attending: INTERNAL MEDICINE

## 2025-05-23 ENCOUNTER — TELEPHONE (OUTPATIENT)
Dept: INTERNAL MEDICINE CLINIC | Facility: CLINIC | Age: OVER 89
End: 2025-05-23

## 2025-05-23 ENCOUNTER — TELEPHONE (OUTPATIENT)
Facility: CLINIC | Age: OVER 89
End: 2025-05-23

## 2025-05-23 DIAGNOSIS — M10.079 IDIOPATHIC GOUT INVOLVING TOE, UNSPECIFIED CHRONICITY, UNSPECIFIED LATERALITY: ICD-10-CM

## 2025-05-23 DIAGNOSIS — E11.8 DIABETES MELLITUS TYPE 2 WITH COMPLICATIONS (HCC): Primary | ICD-10-CM

## 2025-05-23 NOTE — TELEPHONE ENCOUNTER
Patient called because she was wondering if Dr Mendez wanted her to complete labs before her appointment on 6/3. If so could we put those in and give her a call when we have? Please advise thanks!

## 2025-05-23 NOTE — TELEPHONE ENCOUNTER
Pended a DM lab panel, thanks!    Future Appointments   Date Time Provider Department Center   6/3/2025 11:20 AM Constanza Mendez MD [facility] [place]   7/11/2025 11:00 AM Chikis August MD [facility]m [facility] [place]

## 2025-05-27 ENCOUNTER — PATIENT MESSAGE (OUTPATIENT)
Facility: CLINIC | Age: OVER 89
End: 2025-05-27

## 2025-05-27 DIAGNOSIS — G47.33 OSA (OBSTRUCTIVE SLEEP APNEA): Primary | ICD-10-CM

## 2025-05-27 DIAGNOSIS — R09.02 HYPOXIA: ICD-10-CM

## 2025-05-27 DIAGNOSIS — F33.0 MILD RECURRENT MAJOR DEPRESSION: ICD-10-CM

## 2025-05-27 DIAGNOSIS — E11.8 DIABETES MELLITUS TYPE 2 WITH COMPLICATIONS (HCC): ICD-10-CM

## 2025-05-27 DIAGNOSIS — J43.2 CENTRILOBULAR EMPHYSEMA (HCC): ICD-10-CM

## 2025-05-27 DIAGNOSIS — I10 ESSENTIAL HYPERTENSION: ICD-10-CM

## 2025-05-27 DIAGNOSIS — R06.00 DYSPNEA, UNSPECIFIED TYPE: ICD-10-CM

## 2025-05-27 NOTE — TELEPHONE ENCOUNTER
Detailed [employer] message sent to pt, pap supplies ordered to [facility] via [facility].  DME will verify insurance and once approved will contact pt to arrange delivery date/time.  Per [employer] message, pt instructed to follow up with Dr. BEACH, for she is overdue for annual examine.  Provided call back numbers.    [phone] (home)     Referral entered for bipap supplies, authorization is pending.  Ref# [id number]

## 2025-05-29 ENCOUNTER — LAB ENCOUNTER (OUTPATIENT)
Dept: LAB | Facility: HOSPITAL | Age: OVER 89
End: 2025-05-29
Attending: INTERNAL MEDICINE
Payer: MEDICARE

## 2025-05-29 DIAGNOSIS — M10.079 IDIOPATHIC GOUT INVOLVING TOE, UNSPECIFIED CHRONICITY, UNSPECIFIED LATERALITY: ICD-10-CM

## 2025-05-29 DIAGNOSIS — E11.8 DIABETES MELLITUS TYPE 2 WITH COMPLICATIONS (HCC): ICD-10-CM

## 2025-05-29 LAB
ALBUMIN SERPL-MCNC: 4.9 G/DL (ref 3.2–4.8)
ALBUMIN/GLOB SERPL: 1.6 {RATIO} (ref 1–2)
ALP LIVER SERPL-CCNC: 70 U/L (ref 55–142)
ALT SERPL-CCNC: 11 U/L (ref 10–49)
ANION GAP SERPL CALC-SCNC: 14 MMOL/L (ref 0–18)
AST SERPL-CCNC: 16 U/L (ref ?–34)
BILIRUB SERPL-MCNC: 0.9 MG/DL (ref 0.2–1.1)
BUN BLD-MCNC: 41 MG/DL (ref 9–23)
CALCIUM BLD-MCNC: 10.6 MG/DL (ref 8.7–10.6)
CHLORIDE SERPL-SCNC: 107 MMOL/L (ref 98–112)
CHOLEST SERPL-MCNC: 139 MG/DL (ref ?–200)
CO2 SERPL-SCNC: 19 MMOL/L (ref 21–32)
CREAT BLD-MCNC: 1.82 MG/DL (ref 0.55–1.02)
EGFRCR SERPLBLD CKD-EPI 2021: 26 ML/MIN/1.73M2 (ref 60–?)
EST. AVERAGE GLUCOSE BLD GHB EST-MCNC: 171 MG/DL (ref 68–126)
FASTING PATIENT LIPID ANSWER: YES
FASTING STATUS PATIENT QL REPORTED: YES
GLOBULIN PLAS-MCNC: 3.1 G/DL (ref 2–3.5)
GLUCOSE BLD-MCNC: 133 MG/DL (ref 70–99)
HBA1C MFR BLD: 7.6 % (ref ?–5.7)
HDLC SERPL-MCNC: 57 MG/DL (ref 40–59)
LDLC SERPL CALC-MCNC: 65 MG/DL (ref ?–100)
NONHDLC SERPL-MCNC: 82 MG/DL (ref ?–130)
OSMOLALITY SERPL CALC.SUM OF ELEC: 302 MOSM/KG (ref 275–295)
POTASSIUM SERPL-SCNC: 5.2 MMOL/L (ref 3.5–5.1)
PROT SERPL-MCNC: 8 G/DL (ref 5.7–8.2)
SODIUM SERPL-SCNC: 140 MMOL/L (ref 136–145)
TRIGL SERPL-MCNC: 91 MG/DL (ref 30–149)
URATE SERPL-MCNC: 10.6 MG/DL (ref 3.1–7.8)
VLDLC SERPL CALC-MCNC: 14 MG/DL (ref 0–30)

## 2025-05-29 PROCEDURE — 83036 HEMOGLOBIN GLYCOSYLATED A1C: CPT

## 2025-05-29 PROCEDURE — 80061 LIPID PANEL: CPT

## 2025-05-29 PROCEDURE — 80053 COMPREHEN METABOLIC PANEL: CPT

## 2025-05-29 PROCEDURE — 84550 ASSAY OF BLOOD/URIC ACID: CPT

## 2025-05-29 PROCEDURE — 36415 COLL VENOUS BLD VENIPUNCTURE: CPT

## 2025-06-03 ENCOUNTER — OFFICE VISIT (OUTPATIENT)
Dept: INTERNAL MEDICINE CLINIC | Facility: CLINIC | Age: OVER 89
End: 2025-06-03
Payer: MEDICARE

## 2025-06-03 VITALS
HEIGHT: 65 IN | HEART RATE: 80 BPM | DIASTOLIC BLOOD PRESSURE: 54 MMHG | RESPIRATION RATE: 16 BRPM | OXYGEN SATURATION: 84 % | WEIGHT: 145.88 LBS | TEMPERATURE: 98 F | BODY MASS INDEX: 24.3 KG/M2 | SYSTOLIC BLOOD PRESSURE: 130 MMHG

## 2025-06-03 DIAGNOSIS — N18.32 STAGE 3B CHRONIC KIDNEY DISEASE (HCC): ICD-10-CM

## 2025-06-03 DIAGNOSIS — I77.9 BILATERAL CAROTID ARTERY DISEASE, UNSPECIFIED TYPE: ICD-10-CM

## 2025-06-03 DIAGNOSIS — E11.8 DIABETES MELLITUS TYPE 2 WITH COMPLICATIONS (HCC): ICD-10-CM

## 2025-06-03 DIAGNOSIS — J43.9 PULMONARY EMPHYSEMA, UNSPECIFIED EMPHYSEMA TYPE (HCC): ICD-10-CM

## 2025-06-03 DIAGNOSIS — R41.89 COGNITIVE CHANGES: ICD-10-CM

## 2025-06-03 DIAGNOSIS — R93.1 ABNORMAL ECHOCARDIOGRAM: ICD-10-CM

## 2025-06-03 DIAGNOSIS — M10.079 IDIOPATHIC GOUT INVOLVING TOE, UNSPECIFIED CHRONICITY, UNSPECIFIED LATERALITY: ICD-10-CM

## 2025-06-03 DIAGNOSIS — F33.0 MILD RECURRENT MAJOR DEPRESSION: ICD-10-CM

## 2025-06-03 DIAGNOSIS — R19.7 DIARRHEA, UNSPECIFIED TYPE: ICD-10-CM

## 2025-06-03 DIAGNOSIS — I10 ESSENTIAL (PRIMARY) HYPERTENSION: ICD-10-CM

## 2025-06-03 PROCEDURE — 3078F DIAST BP <80 MM HG: CPT | Performed by: INTERNAL MEDICINE

## 2025-06-03 PROCEDURE — 3008F BODY MASS INDEX DOCD: CPT | Performed by: INTERNAL MEDICINE

## 2025-06-03 PROCEDURE — G2211 COMPLEX E/M VISIT ADD ON: HCPCS | Performed by: INTERNAL MEDICINE

## 2025-06-03 PROCEDURE — 1159F MED LIST DOCD IN RCRD: CPT | Performed by: INTERNAL MEDICINE

## 2025-06-03 PROCEDURE — 3075F SYST BP GE 130 - 139MM HG: CPT | Performed by: INTERNAL MEDICINE

## 2025-06-03 PROCEDURE — 99215 OFFICE O/P EST HI 40 MIN: CPT | Performed by: INTERNAL MEDICINE

## 2025-06-03 RX ORDER — GLIMEPIRIDE 1 MG/1
TABLET ORAL
COMMUNITY
Start: 2025-06-03

## 2025-06-03 NOTE — PROGRESS NOTES
[facility]    CHIEF COMPLAINT:    Chief Complaint   Patient presents with    Diabetes     5/24/24-eye exam. Form given. 2/14/24-foot exam.     Other     GI incontinence x 2 -3 years. Episodes happening more frequently. To the point of having no control.                 The following individual(s) verbally consented to be recorded using ambient AI listening technology and understand that they can each withdraw their consent to this listening technology at any point by asking the clinician to turn off or pause the recording:    Patient name: Pooja Khanna  Additional names:  pt's nieces Delicia and Corinne.           HISTORY OF PRESENT ILLNESS:  here for med check.   Htn: Taking meds regularly. No chest pain, no shortness of breath.      Emphysema: on home oxygen.  Has leong which has been a bit worse lately. Sees pulm. She has an appt 7/2025.    She had a recent echo which showed moderate 2-3+ tricuspid regurg, elevated pulm artery pressures, mild aortic stenosis.       Bilateral carotid artery disease: on statin. Also on aspirin 325mg daily which she has been on since her cva 20 years ago. She has not seen dr. No in some years. No doppler in some years.      Dm: her a1c is up to 7.6, has had diarrhea, could be due to metformin. Has low glucoses 3-4 times a week with symptoms.       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 Saw dr. Hills once. She is managing this conservatively. Her calcium is only mildly elevated.      ckd 4: managed with risk factor control. Saw nephrology once. Creatinine 1.82.     New: on bipap. Tolerates it well.     History of Present Illness  Pooja Khanna is an 89 year old female with diabetes who presents with chronic diarrhea and medication follow-up. She is accompanied by her family members, Mary Ellen and Delicia.    She has been experiencing chronic diarrhea for about a year, characterized by watery, loose stools occurring once daily at random times. No  abdominal pain, blood in stools, nausea, or vomiting. She has attempted dietary modifications, avoiding foods like beans that exacerbate her symptoms, but has not found relief. She suspects her diarrhea may be related to her metformin use, although skipping doses does not alleviate the symptoms.    She is currently taking metformin daily, sometimes missing doses, but reports no improvement in diarrhea when doses are missed. Her current metformin dosage is one tablet once a day. Despite the diarrhea, she continues to take her diabetes medication, glimepiride, at a dose of one tablet in the morning and two tablets at night. Her A1c has increased from 7.1% a year ago to 7.6% currently. She reports decreased exercise due to difficulty managing her oxygen needs.    She experiences hypoglycemic episodes, particularly between 4 and 5 PM, with blood sugar levels dropping to 62 mg/dL, causing symptoms like jitteriness and visual disturbances. These episodes occur three to four times a week. She uses small amounts of candy to manage these low blood sugar episodes.    She has a history of pulmonary issues, requiring oxygen therapy, and has experienced shortness of breath. She was previously involved in pulmonary rehabilitation but had to visit the emergency room in March due to low oxygen levels during exercise. She reports difficulty exercising due to her oxygen needs, which has impacted her ability to manage her diabetes through physical activity.    She has a history of elevated uric acid levels and is on allopurinol, taking two tablets at night. Despite this, her recent uric acid level was 10.6 mg/dL, significantly higher than the previous level of 5.2 mg/dL in 2023. No current symptoms of gout such as joint pain or swelling.    She is also on sertraline, metoprolol, and atorvastatin.     Her family has noted some memory issues, such as repeating information and forgetting tasks, which they attribute to her age. They do  not want any additional testing for this.        REVIEW OF SYSTEMS:  See HPI    Current Medications:    Current Medications[1]    PAST MEDICAL, SOCIAL, AND FAMILY HISTORY:  Tobacco:    History   Smoking Status    Former    Types: Cigarettes   Smokeless Tobacco    Never       PHYSICAL EXAM:  /54 (BP Location: Left arm, Patient Position: Sitting, Cuff Size: adult)   Pulse 80   Temp 97.5 °F (36.4 °C) (Temporal)   Resp 16   Ht 5' 5\" (1.651 m)   Wt 145 lb 14.4 oz (66.2 kg)   LMP  (LMP Unknown)   SpO2 (!) 84%   Breastfeeding No   BMI 24.28 kg/m²    GENERAL: well developed, well nourished,in no apparent distress  LUNGS: clear to auscultation  CARDIO: RRR without murmur  GI: good BS's,no masses, HSM or tenderness  MUSCULOSKELETAL:  no edema, muscle strength normal.     DATA:  Results for orders placed or performed in visit on 05/29/25   CMP Now    Collection Time: 05/29/25  9:36 AM   Result Value Ref Range    Glucose 133 (H) 70 - 99 mg/dL    Sodium 140 136 - 145 mmol/L    Potassium 5.2 (H) 3.5 - 5.1 mmol/L    Chloride 107 98 - 112 mmol/L    CO2 19.0 (L) 21.0 - 32.0 mmol/L    Anion Gap 14 0 - 18 mmol/L    BUN 41 (H) 9 - 23 mg/dL    Creatinine 1.82 (H) 0.55 - 1.02 mg/dL    Calcium, Total 10.6 8.7 - 10.6 mg/dL    Calculated Osmolality 302 (H) 275 - 295 mOsm/kg    eGFR-Cr 26 (L) >=60 mL/min/1.73m2    AST 16 <34 U/L    ALT 11 10 - 49 U/L    Alkaline Phosphatase 70 55 - 142 U/L    Bilirubin, Total 0.9 0.2 - 1.1 mg/dL    Total Protein 8.0 5.7 - 8.2 g/dL    Albumin 4.9 (H) 3.2 - 4.8 g/dL    Globulin  3.1 2.0 - 3.5 g/dL    A/G Ratio 1.6 1.0 - 2.0    Patient Fasting for CMP? Yes    Lipids Now    Collection Time: 05/29/25  9:36 AM   Result Value Ref Range    Cholesterol, Total 139 <200 mg/dL    HDL Cholesterol 57 40 - 59 mg/dL    Triglycerides 91 30 - 149 mg/dL    LDL Cholesterol 65 <100 mg/dL    VLDL 14 0 - 30 mg/dL    Non HDL Chol 82 <130 mg/dL    Patient Fasting for Lipid? Yes    Hemoglobin A1C Now    Collection  Time: 05/29/25  9:36 AM   Result Value Ref Range    HgbA1C 7.6 (H) <5.7 %    Estimated Average Glucose 171 (H) 68 - 126 mg/dL   Uric Acid    Collection Time: 05/29/25  9:36 AM   Result Value Ref Range    Uric Acid 10.6 (H) 3.1 - 7.8 mg/dL            ASSESSMENT AND PLAN:  1. Essential (primary) hypertension  Continue meds.     2. Diabetes mellitus type 2 with complications (HCC)  Stop metformin due to diarrhea.   Decrease glimepiride to 1mg bid given her hypoglycemia.   Record glucoses and bring in to next visit.   - glimepiride 1 MG Oral Tab; TAKE 1 TABLET BY MOUTH EVERY MORNING AND 1 TABLETS BY MOUTH EVERY EVENING    3. Pulmonary emphysema, unspecified emphysema type (HCC)  follow up with pulm.     4. Bilateral carotid artery disease, unspecified type  follow up with dr. No.   - US CAROTID DOPPLER BILAT - DIAG IMG (CPT=93880); Future    5. Stage 3b chronic kidney disease (HCC)  Recheck.   - Basic Metabolic Panel (8) [E]; Future    6. Diarrhea, unspecified type  May be sec to metformin.   Will check stool studies.   - Stool Culture w/Shigatoxin [E]; Future  - C. diff toxigenic PCR (OPT) [E]; Future    7. Idiopathic gout involving toe, unspecified chronicity, unspecified laterality  She states she is taking the allopurinol regularly.   Recheck.   - Uric Acid; Future    8. Abnormal echocardiogram  Refer to cardiology.   - [facility] CARDIOLOGY EXTERNAL    9. Mild recurrent major depression  Continue sertraline.     10. Cognitive changes  Discussed in detail.   Declines testing.   Discussed medication management and safety at home.     Assessment & Plan  Chronic Diarrhea  Chronic diarrhea possibly linked to metformin use, persists without metformin. Differential includes metformin-induced diarrhea, infection, or other gastrointestinal issues.  - Discontinue metformin to assess improvement.  - Order stool cultures and C. difficile test.  - Recheck kidney function and potassium levels in one week.  - Advise  hydration.    Type 2 Diabetes Mellitus  Type 2 diabetes with HbA1c increase. Hypoglycemia episodes due to glimepiride. Stopping metformin may increase HbA1c, but dietary modifications and exercise expected to help manage glucose levels.  - Decrease glimepiride to one tablet in the morning and one in the evening.  - Monitor blood glucose at different times of the day.  - Follow up in one month with glucose readings.  - Advise dietary modifications to reduce carbohydrate intake.  - Consider exercise options like an exercise bike or pedal machine as tolerated under the constraints of her lung problems.    Pulmonary Hypertension  Pulmonary hypertension likely related to lung issues. Shortness of breath and oxygen desaturation during pulmonary rehabilitation.  - Continue follow-up with pulmonologist Dr. August.  - Refer to cardiologist for evaluation of cardiac contribution to symptoms.    Tricuspid Regurgitation  Moderate tricuspid regurgitation potentially contributing to shortness of breath.  - Refer to cardiologist for evaluation and management.    Hyperuricemia  Elevated uric acid level despite regular allopurinol use. No symptoms of acute gout attack.  - Recheck uric acid level in one week.  - Confirm allopurinol adherence and dosage.    Memory Concerns  Reports of memory issues, potential mild dementia considered.  - Discuss potential neuropsychological evaluation for memory testing.  - Advise family to oversee medication management.    Recording duration: 38 minutes       Return in about 1 month (around 7/3/2025) for supervisit, med check.    55 minutes spent on provision of medical services today, including chart review, obtaining and reviewing history, performing medically appropriate examination, counseling and educating patient and/or caregiver, ordering testing , medications, referrals or procedures, my independent interpretation of results, communication of results to patient and /or caregiver, care  coordination, and documentation of all of the above.       Constanza Mendez MD         [1]   Current Outpatient Medications   Medication Sig Dispense Refill    glimepiride 1 MG Oral Tab TAKE 1 TABLET BY MOUTH EVERY MORNING AND 1 TABLETS BY MOUTH EVERY EVENING      metoprolol succinate ER 25 MG Oral Tablet 24 Hr TAKE 1/2 TABLET BY MOUTH EVERY MORNING 15 tablet 0    atorvastatin 20 MG Oral Tab TAKE 1 TABLET BY MOUTH EVERY NIGHT 90 tablet 0    ALLOPURINOL 100 MG Oral Tab TAKE 2 TABLETS(200 MG) BY MOUTH DAILY 180 tablet 0    SERTRALINE 50 MG Oral Tab TAKE 1 TABLET(50 MG) BY MOUTH DAILY 90 tablet 0    Blood Glucose Monitoring Suppl (TRUE METRIX METER) Does not apply Device 1 Device by Other route daily. 1 each 0    Glucose Blood (TRUE METRIX BLOOD GLUCOSE TEST) In Vitro Strip USE TO TEST BLOOD SUGAR DAILY. 100 strip 0    Lancets (ONETOUCH DELICA PLUS [id number]) Does not apply Misc 1 lancet  by Finger stick route as needed. AT LEAST DAILY 100 each 1    ONETOUCH ULTRA In Vitro Strip USE TO TEST BLOOD SUGAR DAILY*E11.51 NON INSULIN 100 strip 1    aspirin 325 MG Oral Tab Take 1 tablet (325 mg total) by mouth daily.

## 2025-06-20 ENCOUNTER — HOSPITAL ENCOUNTER (OUTPATIENT)
Dept: ULTRASOUND IMAGING | Facility: HOSPITAL | Age: OVER 89
Discharge: HOME | End: 2025-06-20
Attending: INTERNAL MEDICINE
Payer: MEDICARE

## 2025-06-20 DIAGNOSIS — I77.9 BILATERAL CAROTID ARTERY DISEASE, UNSPECIFIED TYPE: ICD-10-CM

## 2025-06-20 PROCEDURE — 93880 EXTRACRANIAL BILAT STUDY: CPT | Performed by: INTERNAL MEDICINE

## 2025-07-10 NOTE — PROGRESS NOTES
Pooja Khanna  2025 - 2025  : 1935  Age: 89 years  [facility]  [address]  [address]  [place], [address]  Phone: [phone]  Compliance Report  Usage 2025 - 2025  Usage days 84/90 days (93%)  >= 4 hours 80 days (89%)  < 4 hours 4 days (4%)  Usage hours 570 hours 1 minutes  Average usage (total days) 6 hours 20 minutes  Average usage (days used) 6 hours 47 minutes  Median usage (days used) 6 hours 52 minutes  Total used hours (value since last reset - 2025) 9,642 hours  [employer] [facility]  Serial number [id number]  Mode Spont  IPAP 12 cmH2O  EPAP 8 cmH2O  Easy-Breathe On  Therapy  Leaks - L/min Median: 6.7 95th percentile: 52.0 Maximum: 118.9  Events per hour AI: 2.6 HI: 0.7 AHI: 3.3  Apnea Index Central: 0.3 Obstructive: 0.6 Unknown: 1.6

## 2025-07-11 ENCOUNTER — OFFICE VISIT (OUTPATIENT)
Facility: CLINIC | Age: OVER 89
End: 2025-07-11
Payer: MEDICARE

## 2025-07-11 VITALS
OXYGEN SATURATION: 90 % | WEIGHT: 149 LBS | HEIGHT: 65 IN | HEART RATE: 81 BPM | RESPIRATION RATE: 16 BRPM | BODY MASS INDEX: 24.83 KG/M2 | SYSTOLIC BLOOD PRESSURE: 132 MMHG | DIASTOLIC BLOOD PRESSURE: 52 MMHG

## 2025-07-11 DIAGNOSIS — J96.21 ACUTE ON CHRONIC RESPIRATORY FAILURE WITH HYPOXIA (HCC): ICD-10-CM

## 2025-07-11 DIAGNOSIS — R09.02 HYPOXEMIA: Primary | ICD-10-CM

## 2025-07-11 DIAGNOSIS — I27.20 PULMONARY HYPERTENSION (HCC): ICD-10-CM

## 2025-07-11 PROCEDURE — 99214 OFFICE O/P EST MOD 30 MIN: CPT | Performed by: INTERNAL MEDICINE

## 2025-07-11 PROCEDURE — 94618 PULMONARY STRESS TESTING: CPT | Performed by: INTERNAL MEDICINE

## 2025-07-11 PROCEDURE — 3075F SYST BP GE 130 - 139MM HG: CPT | Performed by: INTERNAL MEDICINE

## 2025-07-11 PROCEDURE — 3078F DIAST BP <80 MM HG: CPT | Performed by: INTERNAL MEDICINE

## 2025-07-11 PROCEDURE — 1160F RVW MEDS BY RX/DR IN RCRD: CPT | Performed by: INTERNAL MEDICINE

## 2025-07-11 PROCEDURE — 3008F BODY MASS INDEX DOCD: CPT | Performed by: INTERNAL MEDICINE

## 2025-07-11 PROCEDURE — 1159F MED LIST DOCD IN RCRD: CPT | Performed by: INTERNAL MEDICINE

## 2025-07-11 NOTE — PROGRESS NOTES
[facility] ASSESSMENT    Date: 2025 Physician: FRITZ   Diagnosis: HYPOXEMIA  Technician: Tona PAYNE LPN     Patient: Pooja Khanna MRN: [id number] : 1935     Height: 5' 5\" (1.651 m) Weight: 149 lb Age: 89 year old         BP HR SpO2 RR [place] DIST  (FT) TIME Reason Stopped   RA         REST                           N/A         N/A         N/A           RA       PEAK  EXERCISE                                     O2 FLOW LPM  BP HR SpO2 RR [place] DIST  (FT) TIME Reason Stopped   3lpm   REST 114/68   89   96     14     3                             8LPM PEAK  EXERCISE 136/82 94 94   26   5   150       O2 Desaturation  SOB  Study Ended                        FINDINGS  3 LPM required to maintain a saturation of 96 % at rest   8 LPM required to maintain a saturation of 94 % with exertion     LPM Pulse dose to maintain a saturation of   % with exertion   PATIENT WILL NEED 8 LPM O2 WITH EXERTION.  PATIENT WILL USE   LPM OR PULSE DOSE VIA POC.

## 2025-07-11 NOTE — PATIENT INSTRUCTIONS
-plan--             -- increase O2 with bipap to 5 l             -- we will send new order to [facility] for more O2             -- use 8 l with all exertion 3l at rest              -- to get ABG on O2 and other blood tests              -- to get CT chest                --Repeat overnight oximetry on BiPAP and 5 L              -- call dr beauchamp              -- see me in 6 weeks     Chikis August MD  Pulmonary Medicine  7/11/2025
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
## 2025-07-14 ENCOUNTER — TELEPHONE (OUTPATIENT)
Facility: CLINIC | Age: OVER 89
End: 2025-07-14

## 2025-07-14 NOTE — TELEPHONE ENCOUNTER
Chikis August MD  P [facility] Pulmonary Clinical Staff  Can you please order overnight oximetry for this patient on BiPAP and 5 L please    OVOX ordered via HME.  MCM sent informing pt

## 2025-07-15 DIAGNOSIS — E11.8 DIABETES MELLITUS TYPE 2 WITH COMPLICATIONS (HCC): ICD-10-CM

## 2025-07-15 DIAGNOSIS — M10.9 GOUT, UNSPECIFIED: ICD-10-CM

## 2025-07-15 DIAGNOSIS — F33.0 MILD RECURRENT MAJOR DEPRESSION: ICD-10-CM

## 2025-07-15 DIAGNOSIS — I10 ESSENTIAL (PRIMARY) HYPERTENSION: ICD-10-CM

## 2025-07-16 RX ORDER — CALCIUM CITRATE/VITAMIN D3 200MG-6.25
1 TABLET ORAL DAILY
Qty: 100 STRIP | Refills: 0 | Status: SHIPPED | OUTPATIENT
Start: 2025-07-16

## 2025-07-16 RX ORDER — METOPROLOL SUCCINATE 25 MG/1
12.5 TABLET, EXTENDED RELEASE ORAL EVERY MORNING
Qty: 45 TABLET | Refills: 0 | Status: SHIPPED | OUTPATIENT
Start: 2025-07-16

## 2025-07-16 RX ORDER — GLIMEPIRIDE 1 MG/1
TABLET ORAL
Qty: 270 TABLET | Refills: 0 | OUTPATIENT
Start: 2025-07-16

## 2025-07-16 RX ORDER — ALLOPURINOL 100 MG/1
200 TABLET ORAL DAILY
Qty: 180 TABLET | Refills: 0 | Status: SHIPPED | OUTPATIENT
Start: 2025-07-16

## 2025-07-16 RX ORDER — GLIMEPIRIDE 1 MG/1
TABLET ORAL
Qty: 180 TABLET | Refills: 0 | Status: SHIPPED | OUTPATIENT
Start: 2025-07-16 | End: 2025-07-18

## 2025-07-16 NOTE — TELEPHONE ENCOUNTER
Called patient to discuss meds to find if still taking as low med adherence.  Glimepiride was pended to us how patient previously was taken- changed sig according to receent last office visit. Will verify with patient if taking how advised per last office visit.  Overdue for labs- will also advise once patient calls back. Was going to ask if still having diarrhea and if stopped metformin (stool studies were ordered). Awaiting call back.      Last OV relevant to medication: 6/3/25   Last refill date:      #/refills:    Metoprolol 5/12/25  15/0   Sertraline 12/4/24  90/0  Allopurinol 12/4/24  180/0  Glimepiride 12/4/24 different /0  Strips  7/1/24  100/0    When pt was asked to return for OV: Return in about 1 month (around 7/3/2025) for supervisit, med check.    Upcoming appt/reason:   Future Appointments   Date Time Provider Department Center   7/17/2025 11:00 AM RT ROOM [facility]   7/18/2025 11:00 AM EH CT MAIN RM3 [facility]   7/22/2025 11:20 AM Constanza Mendez MD [facility] [address] [facility] [address] [facility]   8/8/2025 11:45 AM Chikis August MD [facility] Pulm [facility] [place]       Was pt informed of any over due labs: overdue      Diabetic Supplies Protocol [id number]/15/2025 02:10 PM   Protocol Details In person appointment or virtual visit in the past 12 mos or appointment in next 3 mos    Medication is active on med list           Lab Results   Component Value Date     (H) 05/29/2025    BUN 41 (H) 05/29/2025    BUNCREA 23.5 (H) 06/28/2021    CREATSERUM 1.82 (H) 05/29/2025    ANIONGAP 14 05/29/2025    GFR 28 (L) 04/05/2017    GFRNAA 28 (L) 02/25/2022    GFRAA 33 (L) 02/25/2022    CA 10.6 05/29/2025    OSMOCALC 302 (H) 05/29/2025    ALKPHO 70 05/29/2025    AST 16 05/29/2025    ALT 11 05/29/2025    BILT 0.9 05/29/2025    TP 8.0 05/29/2025    ALB 4.9 (H) 05/29/2025    GLOBULIN 3.1 05/29/2025     05/29/2025    K 5.2 (H) 05/29/2025     05/29/2025    CO2 19.0 (L) 05/29/2025       Lab Results   Component Value  Date    24VOL 1,450 04/25/2018    URIC 10.6 (H) 05/29/2025       Lab Results   Component Value Date     (H) 05/29/2025    A1C 7.6 (H) 05/29/2025       Lab Results   Component Value Date    MALBP 1.30 07/26/2024    CREUR 96.40 07/26/2024

## 2025-07-18 RX ORDER — GLIMEPIRIDE 1 MG/1
TABLET ORAL
Qty: 270 TABLET | Refills: 0 | Status: SHIPPED | OUTPATIENT
Start: 2025-07-18

## 2025-07-18 NOTE — TELEPHONE ENCOUNTER
I spoke with pt, she is actually still taking Glimepiride 1mg in AM and 2mg (2 tablets) in evening. She stopped the metformin and her gi symptoms have resolved and she feels much better. Sugars at home are stable in the 90s-120s and no reported hypoglycemia or symptoms. She hasn't picked up rx yet- re-sent for sig/quantity pt is taking. Olga Lidia thanks!!
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
## 2025-07-22 ENCOUNTER — OFFICE VISIT (OUTPATIENT)
Dept: INTERNAL MEDICINE CLINIC | Facility: CLINIC | Age: OVER 89
End: 2025-07-22
Payer: MEDICARE

## 2025-07-22 VITALS
OXYGEN SATURATION: 91 % | WEIGHT: 150.19 LBS | RESPIRATION RATE: 20 BRPM | TEMPERATURE: 97 F | HEART RATE: 80 BPM | HEIGHT: 65 IN | DIASTOLIC BLOOD PRESSURE: 60 MMHG | BODY MASS INDEX: 25.02 KG/M2 | SYSTOLIC BLOOD PRESSURE: 132 MMHG

## 2025-07-22 DIAGNOSIS — J44.9 OSA AND COPD OVERLAP SYNDROME (HCC): ICD-10-CM

## 2025-07-22 DIAGNOSIS — G47.33 OSA AND COPD OVERLAP SYNDROME (HCC): ICD-10-CM

## 2025-07-22 DIAGNOSIS — E21.0 HYPERPARATHYROIDISM, PRIMARY (HCC): ICD-10-CM

## 2025-07-22 DIAGNOSIS — J47.9 BRONCHIECTASIS WITHOUT COMPLICATION (HCC): ICD-10-CM

## 2025-07-22 DIAGNOSIS — R06.00 DYSPNEA, UNSPECIFIED TYPE: ICD-10-CM

## 2025-07-22 DIAGNOSIS — I10 ESSENTIAL HYPERTENSION: ICD-10-CM

## 2025-07-22 DIAGNOSIS — E11.42 DIABETIC POLYNEUROPATHY ASSOCIATED WITH TYPE 2 DIABETES MELLITUS (HCC): ICD-10-CM

## 2025-07-22 DIAGNOSIS — Z86.73 HISTORY OF STROKE: ICD-10-CM

## 2025-07-22 DIAGNOSIS — Z99.81 CHRONIC HYPOXIC RESPIRATORY FAILURE, ON HOME OXYGEN THERAPY (HCC): ICD-10-CM

## 2025-07-22 DIAGNOSIS — I27.20 PULMONARY HYPERTENSION (HCC): ICD-10-CM

## 2025-07-22 DIAGNOSIS — I35.0 AORTIC STENOSIS, MILD: ICD-10-CM

## 2025-07-22 DIAGNOSIS — H65.191 OTHER NON-RECURRENT ACUTE NONSUPPURATIVE OTITIS MEDIA OF RIGHT EAR: ICD-10-CM

## 2025-07-22 DIAGNOSIS — Z87.39 HISTORY OF GOUT: ICD-10-CM

## 2025-07-22 DIAGNOSIS — I07.1 MODERATE TRICUSPID REGURGITATION: ICD-10-CM

## 2025-07-22 DIAGNOSIS — E11.8 DIABETES MELLITUS TYPE 2 WITH COMPLICATIONS (HCC): ICD-10-CM

## 2025-07-22 DIAGNOSIS — Z98.890 S/P LUMBAR MICRODISCECTOMY: ICD-10-CM

## 2025-07-22 DIAGNOSIS — J96.11 CHRONIC HYPOXIC RESPIRATORY FAILURE, ON HOME OXYGEN THERAPY (HCC): ICD-10-CM

## 2025-07-22 DIAGNOSIS — I65.23 BILATERAL CAROTID ARTERY STENOSIS: ICD-10-CM

## 2025-07-22 DIAGNOSIS — N25.81 SECONDARY HYPERPARATHYROIDISM (HCC): ICD-10-CM

## 2025-07-22 DIAGNOSIS — G47.33 OSA (OBSTRUCTIVE SLEEP APNEA): ICD-10-CM

## 2025-07-22 DIAGNOSIS — F33.0 MILD RECURRENT MAJOR DEPRESSION: Chronic | ICD-10-CM

## 2025-07-22 DIAGNOSIS — J43.9 PULMONARY EMPHYSEMA, UNSPECIFIED EMPHYSEMA TYPE (HCC): ICD-10-CM

## 2025-07-22 DIAGNOSIS — N18.4 CKD (CHRONIC KIDNEY DISEASE) STAGE 4, GFR 15-29 ML/MIN (HCC): Chronic | ICD-10-CM

## 2025-07-22 DIAGNOSIS — Z00.00 ENCOUNTER FOR ANNUAL HEALTH EXAMINATION: Primary | ICD-10-CM

## 2025-07-22 PROCEDURE — 99499 UNLISTED E&M SERVICE: CPT | Performed by: INTERNAL MEDICINE

## 2025-07-22 PROCEDURE — 99214 OFFICE O/P EST MOD 30 MIN: CPT | Performed by: INTERNAL MEDICINE

## 2025-07-22 PROCEDURE — G2211 COMPLEX E/M VISIT ADD ON: HCPCS | Performed by: INTERNAL MEDICINE

## 2025-07-22 PROCEDURE — 3078F DIAST BP <80 MM HG: CPT | Performed by: INTERNAL MEDICINE

## 2025-07-22 PROCEDURE — 3008F BODY MASS INDEX DOCD: CPT | Performed by: INTERNAL MEDICINE

## 2025-07-22 PROCEDURE — 1125F AMNT PAIN NOTED PAIN PRSNT: CPT | Performed by: INTERNAL MEDICINE

## 2025-07-22 PROCEDURE — 1159F MED LIST DOCD IN RCRD: CPT | Performed by: INTERNAL MEDICINE

## 2025-07-22 PROCEDURE — 3075F SYST BP GE 130 - 139MM HG: CPT | Performed by: INTERNAL MEDICINE

## 2025-07-22 PROCEDURE — 1170F FXNL STATUS ASSESSED: CPT | Performed by: INTERNAL MEDICINE

## 2025-07-22 PROCEDURE — 96160 PT-FOCUSED HLTH RISK ASSMT: CPT | Performed by: INTERNAL MEDICINE

## 2025-07-22 PROCEDURE — G0439 PPPS, SUBSEQ VISIT: HCPCS | Performed by: INTERNAL MEDICINE

## 2025-07-22 NOTE — PATIENT INSTRUCTIONS
Vaccines needed:   Tdap  Shingrix  Rsv  Covid booster    Do not get the pneumonia vaccine as you are allergic to it.

## 2025-07-23 DIAGNOSIS — I70.0 TYPE 2 DIABETES MELLITUS WITH ATHEROSCLEROSIS OF AORTA (HCC): ICD-10-CM

## 2025-07-23 DIAGNOSIS — E11.59 TYPE 2 DIABETES MELLITUS WITH ATHEROSCLEROSIS OF AORTA (HCC): ICD-10-CM

## 2025-07-23 DIAGNOSIS — M10.9 GOUT, UNSPECIFIED: ICD-10-CM

## 2025-07-23 DIAGNOSIS — F33.0 MILD RECURRENT MAJOR DEPRESSION: ICD-10-CM

## 2025-07-23 RX ORDER — ALLOPURINOL 100 MG/1
200 TABLET ORAL DAILY
Qty: 180 TABLET | Refills: 0 | OUTPATIENT
Start: 2025-07-23

## 2025-07-23 RX ORDER — CALCIUM CITRATE/VITAMIN D3 200MG-6.25
1 TABLET ORAL DAILY
Qty: 100 STRIP | Refills: 1 | Status: SHIPPED | OUTPATIENT
Start: 2025-07-23

## 2025-08-05 ENCOUNTER — LAB ENCOUNTER (OUTPATIENT)
Dept: LAB | Facility: HOSPITAL | Age: OVER 89
End: 2025-08-05
Attending: INTERNAL MEDICINE

## 2025-08-05 DIAGNOSIS — E11.8 DIABETES MELLITUS TYPE 2 WITH COMPLICATIONS (HCC): ICD-10-CM

## 2025-08-05 DIAGNOSIS — N18.32 STAGE 3B CHRONIC KIDNEY DISEASE (HCC): ICD-10-CM

## 2025-08-05 DIAGNOSIS — I65.23 BILATERAL CAROTID ARTERY STENOSIS: ICD-10-CM

## 2025-08-05 DIAGNOSIS — M10.079 IDIOPATHIC GOUT INVOLVING TOE, UNSPECIFIED CHRONICITY, UNSPECIFIED LATERALITY: ICD-10-CM

## 2025-08-05 LAB
ALBUMIN SERPL-MCNC: 4.3 G/DL (ref 3.2–4.8)
ALBUMIN/GLOB SERPL: 1.4 (ref 1–2)
ALP LIVER SERPL-CCNC: 73 U/L (ref 55–142)
ALT SERPL-CCNC: 11 U/L (ref 10–49)
ANION GAP SERPL CALC-SCNC: 8 MMOL/L (ref 0–18)
AST SERPL-CCNC: 14 U/L (ref ?–34)
BILIRUB SERPL-MCNC: 1.7 MG/DL (ref 0.2–0.9)
BUN BLD-MCNC: 33 MG/DL (ref 9–23)
CALCIUM BLD-MCNC: 9.8 MG/DL (ref 8.7–10.6)
CHLORIDE SERPL-SCNC: 109 MMOL/L (ref 98–112)
CHOLEST SERPL-MCNC: 139 MG/DL (ref ?–200)
CO2 SERPL-SCNC: 25 MMOL/L (ref 21–32)
CREAT BLD-MCNC: 1.73 MG/DL (ref 0.55–1.02)
CREAT UR-SCNC: 85.1 MG/DL
EGFRCR SERPLBLD CKD-EPI 2021: 28 ML/MIN/1.73M2 (ref 60–?)
EST. AVERAGE GLUCOSE BLD GHB EST-MCNC: 160 MG/DL (ref 68–126)
FASTING PATIENT LIPID ANSWER: YES
FASTING STATUS PATIENT QL REPORTED: YES
GLOBULIN PLAS-MCNC: 3.1 G/DL (ref 2–3.5)
GLUCOSE BLD-MCNC: 182 MG/DL (ref 70–99)
HBA1C MFR BLD: 7.2 % (ref ?–5.7)
HDLC SERPL-MCNC: 62 MG/DL (ref 40–59)
LDLC SERPL CALC-MCNC: 64 MG/DL (ref ?–100)
MICROALBUMIN UR-MCNC: 1.5 MG/DL
MICROALBUMIN/CREAT 24H UR-RTO: 17.6 UG/MG (ref ?–30)
NONHDLC SERPL-MCNC: 77 MG/DL (ref ?–130)
OSMOLALITY SERPL CALC.SUM OF ELEC: 306 MOSM/KG (ref 275–295)
POTASSIUM SERPL-SCNC: 5.3 MMOL/L (ref 3.5–5.1)
PROT SERPL-MCNC: 7.4 G/DL (ref 5.7–8.2)
SODIUM SERPL-SCNC: 142 MMOL/L (ref 136–145)
TRIGL SERPL-MCNC: 61 MG/DL (ref 30–149)
URATE SERPL-MCNC: 9.9 MG/DL (ref 3.1–7.8)
VLDLC SERPL CALC-MCNC: 9 MG/DL (ref 0–30)

## 2025-08-05 PROCEDURE — 83036 HEMOGLOBIN GLYCOSYLATED A1C: CPT

## 2025-08-05 PROCEDURE — 84550 ASSAY OF BLOOD/URIC ACID: CPT

## 2025-08-05 PROCEDURE — 36415 COLL VENOUS BLD VENIPUNCTURE: CPT

## 2025-08-05 PROCEDURE — 80061 LIPID PANEL: CPT

## 2025-08-05 PROCEDURE — 80053 COMPREHEN METABOLIC PANEL: CPT

## 2025-08-12 ENCOUNTER — HOSPITAL ENCOUNTER (OUTPATIENT)
Dept: CT IMAGING | Facility: HOSPITAL | Age: OVER 89
Discharge: HOME | End: 2025-08-12
Attending: INTERNAL MEDICINE

## 2025-08-12 DIAGNOSIS — J96.21 ACUTE ON CHRONIC RESPIRATORY FAILURE WITH HYPOXIA (HCC): ICD-10-CM

## 2025-08-12 DIAGNOSIS — I27.20 PULMONARY HYPERTENSION (HCC): ICD-10-CM

## 2025-08-12 PROCEDURE — 71250 CT THORAX DX C-: CPT | Performed by: INTERNAL MEDICINE

## 2025-08-21 ENCOUNTER — TELEPHONE (OUTPATIENT)
Facility: CLINIC | Age: OVER 89
End: 2025-08-21

## 2025-08-21 DIAGNOSIS — J43.2 CENTRILOBULAR EMPHYSEMA (HCC): Primary | ICD-10-CM

## 2025-08-26 ENCOUNTER — MED REC SCAN ONLY (OUTPATIENT)
Facility: CLINIC | Age: OVER 89
End: 2025-08-26

## 2025-08-29 ENCOUNTER — OFFICE VISIT (OUTPATIENT)
Facility: CLINIC | Age: OVER 89
End: 2025-08-29

## 2025-08-29 VITALS
OXYGEN SATURATION: 94 % | DIASTOLIC BLOOD PRESSURE: 42 MMHG | BODY MASS INDEX: 24.83 KG/M2 | SYSTOLIC BLOOD PRESSURE: 112 MMHG | HEIGHT: 65 IN | HEART RATE: 106 BPM | WEIGHT: 149 LBS | RESPIRATION RATE: 16 BRPM

## 2025-08-29 DIAGNOSIS — J96.21 ACUTE ON CHRONIC RESPIRATORY FAILURE WITH HYPOXIA (HCC): Primary | ICD-10-CM

## 2025-08-29 DIAGNOSIS — I27.20 PULMONARY HYPERTENSION (HCC): ICD-10-CM

## 2025-08-29 DIAGNOSIS — G47.33 OSA (OBSTRUCTIVE SLEEP APNEA): ICD-10-CM

## 2025-08-29 PROCEDURE — 99214 OFFICE O/P EST MOD 30 MIN: CPT | Performed by: INTERNAL MEDICINE

## 2025-08-29 PROCEDURE — 3078F DIAST BP <80 MM HG: CPT | Performed by: INTERNAL MEDICINE

## 2025-08-29 PROCEDURE — 3008F BODY MASS INDEX DOCD: CPT | Performed by: INTERNAL MEDICINE

## 2025-08-29 PROCEDURE — 3074F SYST BP LT 130 MM HG: CPT | Performed by: INTERNAL MEDICINE

## 2025-08-29 PROCEDURE — 1160F RVW MEDS BY RX/DR IN RCRD: CPT | Performed by: INTERNAL MEDICINE

## 2025-08-29 PROCEDURE — 1159F MED LIST DOCD IN RCRD: CPT | Performed by: INTERNAL MEDICINE

## 2025-08-30 ENCOUNTER — RT VISIT (OUTPATIENT)
Dept: RESPIRATORY THERAPY | Facility: HOSPITAL | Age: OVER 89
End: 2025-08-30
Attending: INTERNAL MEDICINE

## 2025-08-30 DIAGNOSIS — J43.2 CENTRILOBULAR EMPHYSEMA (HCC): ICD-10-CM

## 2025-08-30 DIAGNOSIS — J96.21 ACUTE ON CHRONIC RESPIRATORY FAILURE WITH HYPOXIA (HCC): ICD-10-CM

## 2025-08-30 LAB
ARTERIAL PATENCY WRIST A: POSITIVE
BASE EXCESS BLDA CALC-SCNC: -8.2 MMOL/L (ref ?–2)
BODY TEMPERATURE: 98.6 F
CA-I BLD-SCNC: 1.35 MMOL/L (ref 0.95–1.32)
COHGB MFR BLD: 2.1 % SAT (ref 0–3)
HCO3 BLDA-SCNC: 18.2 MEQ/L (ref 21–27)
HGB BLD-MCNC: 12 G/DL (ref 12–16)
L/M: 3 L/MIN
LACTATE BLD-SCNC: 1.9 MMOL/L (ref 0.5–2)
METHGB MFR BLD: 0.8 % SAT (ref 0.4–1.5)
OXYHGB MFR BLDA: 84.1 % (ref 92–100)
PCO2 BLDA: 26 MM HG (ref 35–45)
PH BLDA: 7.38 (ref 7.35–7.45)
PO2 BLDA: 52 MM HG (ref 80–100)
POTASSIUM BLD-SCNC: 4.6 MMOL/L (ref 3.6–5.1)
SODIUM BLD-SCNC: 136 MMOL/L (ref 135–145)

## 2025-08-30 PROCEDURE — 84295 ASSAY OF SERUM SODIUM: CPT

## 2025-08-30 PROCEDURE — 82805 BLOOD GASES W/O2 SATURATION: CPT

## 2025-08-30 PROCEDURE — 85018 HEMOGLOBIN: CPT

## 2025-08-30 PROCEDURE — 36600 WITHDRAWAL OF ARTERIAL BLOOD: CPT

## 2025-08-30 PROCEDURE — 83050 HGB METHEMOGLOBIN QUAN: CPT

## 2025-08-30 PROCEDURE — 83605 ASSAY OF LACTIC ACID: CPT

## 2025-08-30 PROCEDURE — 82375 ASSAY CARBOXYHB QUANT: CPT

## 2025-08-30 PROCEDURE — 84132 ASSAY OF SERUM POTASSIUM: CPT

## 2025-08-30 PROCEDURE — 82330 ASSAY OF CALCIUM: CPT

## (undated) DIAGNOSIS — F33.0 MILD RECURRENT MAJOR DEPRESSION (HCC): ICD-10-CM

## (undated) DIAGNOSIS — M10.9 GOUT, UNSPECIFIED: ICD-10-CM

## (undated) DIAGNOSIS — I70.0 TYPE 2 DIABETES MELLITUS WITH ATHEROSCLEROSIS OF AORTA (HCC): ICD-10-CM

## (undated) DIAGNOSIS — R06.00 DYSPNEA: Primary | ICD-10-CM

## (undated) DIAGNOSIS — I10 ESSENTIAL (PRIMARY) HYPERTENSION: ICD-10-CM

## (undated) DIAGNOSIS — Z01.818 PREOP EXAMINATION: Primary | ICD-10-CM

## (undated) DIAGNOSIS — E11.51 TYPE 2 DIABETES MELLITUS WITH ATHEROSCLEROSIS OF AORTA (HCC): ICD-10-CM

## (undated) DIAGNOSIS — Z01.818 PREOP EXAMINATION: ICD-10-CM

## (undated) DIAGNOSIS — Z11.59 ENCOUNTER FOR SCREENING FOR OTHER VIRAL DISEASES: Primary | ICD-10-CM

## (undated) DIAGNOSIS — Z11.59 SCREENING FOR VIRAL DISEASE: ICD-10-CM

## (undated) DEVICE — GLOVE SURG SENSICARE SZ 6-1/2

## (undated) DEVICE — NEEDLE SPINAL 22X3-1/2 BLK

## (undated) DEVICE — GLOVE SURG SENSICARE SZ 7-1/2

## (undated) DEVICE — PAIN TRAY: Brand: MEDLINE INDUSTRIES, INC.

## (undated) DEVICE — SKIN MARKER DUAL TIP WITH RULER CAP AND LABELS: Brand: DEVON

## (undated) DEVICE — SCD SLEEVE KNEE HI BLEND

## (undated) DEVICE — Device: Brand: PLUMEPEN

## (undated) DEVICE — MICRO COVER: Brand: UNBRANDED

## (undated) DEVICE — SOL  .9 1000ML BTL

## (undated) DEVICE — OIL CARTRIDGE: Brand: CORE, MAESTRO

## (undated) DEVICE — LAMINECTOMY CDS: Brand: MEDLINE INDUSTRIES, INC.

## (undated) DEVICE — SUTURE VICRYL 0 CT-1

## (undated) DEVICE — C-ARM: Brand: UNBRANDED

## (undated) DEVICE — SUTURE MONOCRYL 4-0 PS-2

## (undated) DEVICE — CATH IV 14G X 5-1/4 ANGIO

## (undated) DEVICE — MARKER SKIN 2 TIP

## (undated) DEVICE — REMOVER DURAPREP 3M

## (undated) DEVICE — SUTURE VICRYL 2-0 CT-2

## (undated) DEVICE — SUTURE VICRYL 3-0 RB-1

## (undated) DEVICE — BANDAID COVERLET 1X3

## (undated) DEVICE — BANDAID CURAD 3IN X 1IN

## (undated) DEVICE — MARKER SKIN PREP RESIST STRL

## (undated) DEVICE — REMOVER PREP SOLUTION 4OZ

## (undated) DEVICE — EXOFIN TISSUE ADHESIVE 1.0ML

## (undated) DEVICE — GOWN SURG AERO CHROME XXL

## (undated) DEVICE — STERILE SYNTHETIC POLYISOPRENE POWDER-FREE SURGICAL GLOVES WITH HYDROGEL COATING: Brand: PROTEXIS

## (undated) DEVICE — 3.0MM PRECISION NEURO (MATCH HEAD)

## (undated) DEVICE — CHLORAPREP ORANGE TINT 10.5ML

## (undated) DEVICE — STERILE POLYISOPRENE POWDER-FREE SURGICAL GLOVES: Brand: PROTEXIS

## (undated) DEVICE — Device: Brand: INTELLICART™

## (undated) DEVICE — DRAPE,LAPAROTOMY,PCH,STERILE: Brand: MEDLINE

## (undated) DEVICE — ALCOHOL 70% 4 OZ

## (undated) NOTE — LETTER
3/15/2022    Dear Dr. Todd Negron    I would like to refer you Zander Sharpe. Referring Provider: Gregorio Damon MD    Fax: [phone]    As soon as the patient is seen please complete the form below and fax to the referring provider without a cover sheet. Exam Date _______________    Best corrected vision compared to last visit:    Unchanged               Better                       Worse                            No previous visit to compare    Retinal changes compared to last visit:    No retinopathy          Unchanged               Worse               Retinopathy needs Tx Laser/Surgery    For patients with cataracts compared to last visit:    Unchanged               Worse           No previous visit to compare        Cataracts need surgery    Other finding and diagnosis________________________________________________    Treatment recommended__________________________________________________    Return Visit_____________________________________________________________    Thank you for your professional help in treating our patient. Ophthalmologist (Print):_________________________________Signature__________________________    Address: _________________________________________________________________    Telephone: _______________________     Please provide Maribell Ortega copies of my medical records as requested.     Patient's signature___________________________________Date_________________________

## (undated) NOTE — LETTER
2023    Dear Dr. Broderick Walsh would like to refer you Casie Cox. # 1935    Referring Provider: Cuong Morrell MD    Fax: [phone]    As soon as the patient is seen please complete the form below and fax to the referring provider without a cover sheet. Exam Date _______________    Best corrected vision compared to last visit:    Unchanged               Better                       Worse                            No previous visit to compare    Retinal changes compared to last visit:    No retinopathy          Unchanged               Worse               Retinopathy needs Tx Laser/Surgery    For patients with cataracts compared to last visit:    Unchanged               Worse           No previous visit to compare        Cataracts need surgery    Other finding and diagnosis________________________________________________    Treatment recommended__________________________________________________    Return Visit_____________________________________________________________    Thank you for your professional help in treating our patient. Ophthalmologist (Print):_________________________________Signature__________________________    Address: _________________________________________________________________    Telephone: _______________________     Please provide Dr. Cuong Morrell MD______________________________________copies of my medical records as requested.     Patient's signature___________________________________Date_________________________

## (undated) NOTE — LETTER
Diabetes Retinal Eye Examination Report    Patient Name: Suzi Kern                                        : 1935    Referring Physician: Kimberly Moya MD  _____________________________________________________________________________  Patient - Please bring this form to your next eye examination appointment. Give it to your eye care professional to fill out and return via fax to your primary care provider. Eye Care Professional - Please fill out this form and fax it back to the referring  primary care physician.   _____________________________________________________________________________     Date of Exam: ___/___/___    The patient received a dilated fundus examination with the following results:    ___ No diabetic retinopathy detected  ___ Background retinopathy was detected, but only requires monitoring  ___ Retinopathy requiring further testing and/or treatment was detected. See notes below. Notes / Commentary / Recommendations:  _________________________________________________________________________________________________________________________________________________________________________________________________________________________________    The patient is to return for follow-up / evaluation in ____ months.     Eye Care Provider (Print): _______________________Signature___________________ Date ___ / ___/___    Clinic/Office name: _______________________Ph:_____________Fax:_____________

## (undated) NOTE — LETTER
1/29/2019    Dear Dr. Nia Ambriz would like to refer you Farheen Sierra     Referring Provider: Livier Salazar MD    Fax: [phone]    As soon as the patient is seen please complete the form below and fax to the referring provider without a cover she

## (undated) NOTE — LETTER
[facility], [address], [place]  [address]   [facility] [address] [phone]        April 10, 2025        Pooja Barth Fister  [address]  [facility] [address]      Dear Pooja:    We have attempted to contact you by phone with no success. In an effort to provide quality patient care we are reaching out to you to contact the office at your earliest convenience to schedule  an Emergency Room follow up exam.    If you have established care with a different provider, please call our office so we can update your file to list the correct provider name and information. Once we update your file with this information it will eliminate further phone calls and/or correspondence from our office.Thank you for your cooperation.      If you have recently contacted us, please disregard this letter.      You may call the office at [phone] our phones are on service Monday-Thursday 8am-4:45 pm and Friday 8am-3 pm.      Thank you,      The office of Dr. Constanza Mendez

## (undated) NOTE — IP AVS SNAPSHOT
[address]            (For Outpatient Use Only) Initial Admit Date: 10/20/2021   Inpt/Obs Admit Date: Inpt: 10/21/21 / Obs: N/A   Discharge Date:    Elizabeth Martínez:  [de-identified]   MRN: [de-identified]   CSN: [id number]   CEID: [id number] Insurance Type:    Subscriber Name:  Subscriber :    Subscriber ID:  Pt Rel to Subscriber:    Hospital Account Financial Class: Medicare Advantage    2021

## (undated) NOTE — LETTER
21        RE: Chad Villarreal     : 1935    Dear Dr. Tashia Eller,    This letter is to inform you that your patient is being scheduled for surgery with Dr. Berline Meigs on 10/20/21 at [facility]. We have asked the patient to contact your

## (undated) NOTE — LETTER
Diabetes Retinal Eye Examination Report    Patient Name: Pooja Khanna                                        : 1935    Referring Physician: Constanza Mendez MD  _____________________________________________________________________________  Patient - Please bring this form to your next eye examination appointment.   Give it to your eye care professional to fill out and return via fax to your primary care provider.     Eye Care Professional - Please fill out this form and fax it back to the referring  primary care physician.   _____________________________________________________________________________     Date of Exam: ___/___/___    The patient received a dilated fundus examination with the following results:    ___ No diabetic retinopathy detected  ___ Background retinopathy was detected, but only requires monitoring  ___ Retinopathy requiring further testing and/or treatment was detected. See notes below.    Notes / Commentary / Recommendations:  _________________________________________________________________________________________________________________________________________________________________________________________________________________________________    The patient is to return for follow-up / evaluation in ____ months.    Eye Care Provider (Print): _______________________Signature___________________ Date ___ / ___/___    Clinic/Office name: _______________________Ph:_____________Fax:_____________

## (undated) NOTE — LETTER
8/3/2021    Dear Dr. Santo Claude would like to refer you G[facility] 7/6/1935    Referring Provider: Babita Hinton MD    Fax: [phone]    As soon as the patient is seen please complete the form below and fax to the referring provider without a

## (undated) NOTE — LETTER
Date: 23    Patient: Zander Sharpe  : 1935  Member ID #: [id number]    RE: APPEAL for [id number] Magnetic Resonance Imaging (MRI) Cervical Spine without Contrast       To whom it may concern:     I am writing to appeal the denial of a 22121 Magnetic Resonance Imaging (MRI) Cervical Spine without Contrast for patient Ward Klinefelter. I have been treating Zander Sharpe since 21 for diagnosis of Lumbar radiculopathy M54.16, S/P lumbar discectomy Z98.890, Hand numbness R20.0. Previous Lumbar MRI reveals cervical stenosis on the  views which are inadequately evaluated. The patient has upper extremity numbness concerning for myelopathy. If she has myelomalacia or significant stenosis on MRI, surgery would be indicated. The decision by the patient's insurance company to deny coverage constitutes an act of withholding treatment that can potentially benefit a patient and therefore we would like to appeal for reconsideration of the recommended treatment.         Sincerely,        Jose Cruz Claros PA-C

## (undated) NOTE — LETTER
12/11/19    [address]      Dear Ms. Blake Shirley office has been trying to reach you.  After we received a fax from your pharmacy notifying our office that the [address] prescription is too expensive for

## (undated) NOTE — IP AVS SNAPSHOT
Patient Demographics     Address  [address] [place] [id number] Phone  [phone] [facility])  [phone] (Mobile) *Preferred* E-mail Address  [email or web address]      Emergency Contact(s)     Name Relation Home Work Mobile    Nathaly to sitting restrictions.     Stairs  • Climb stairs as needed    Incision site care and dressing  Changes as directed by your surgeon    IF ZIAABNILESH Lujan)  • May leave open to air or apply and change dressing once a day using dry sterile gauze and paper t endorphins that are a natural body chemical that can decrease pain. Some examples of relaxation techniques are deep breathing, listening to music or meditating. • May use Cold therapy for 20 minutes multiple times per day.   Be sure there is a cloth leslie info.       Follow-up Information     [place], [place].     Specialties: Physician Assistant, NEUROSURGERY  Why: 11/4/2021 10:00 AM for incision check  Contact information:  Gregory Suárez [address] [place] [address] Nilesaz[address]yed Love [address] [place] [address] Angely Goode MD         OneTouch Ultra Strp  Generic drug: Glucose Blood      USE TO TEST BLOOD SUGAR DAILY   Angely Goode MD         sertraline 50 MG Tabs  Commonly known as: ZOLOFT  Next dose due: 10/26      Take 1 tablet (50 mg total 1210   SpO2 90 % Filed at 10/25/2021 1210      Patient's Most Recent Weight       Most Recent Value   Patient Weight 75 kg (165 lb 5.5 oz)      CPAP Settings (Inpatient)       Most Recent Value   Mode AVAPS   Interface Full face mask   Mask size Large   Se neural foraminal stenosis at L5-S1.       4.  Facet arthropathy throughout the lumbar spine, most pronounced at L4-5.      XR Lumbar -      There are degenerative changes in the lumbar spine.  There is 2 mm of anterolisthesis of L4 on L5 which is most likel name: Not on file      Number of children: Not on file      Years of education: Not on file      Highest education level: Not on file    Tobacco Use      Smoking status: Former Smoker        Quit date: 1998        Years since quittin.2      Smokel History of stroke       Proceed with Right L4-5 discectomy as scheduled with Dr. Albertina Camara. The risks, indication, options, and benefits of the operation were discussed with patient at length.   All questions were answered and the patient wishes to procee medication-denies any coughing or difficulty swallowing    History:  Past Medical History:   Diagnosis Date   • Acute, but ill-defined, cerebrovascular disease 1998   • Aortic stenosis, mild 10/29/2019   • Arthritis    • Arthritis of facet joint of lumbar Vaccine       HIVES  Haemophilus Influen*    HIVES  Prevnar 13 [Prevnar]    HIVES  Radiology Contrast *    HIVES    Medications:  Acetaminophen (ACETAMINOPHEN EXTRA STRENGTH) 500 MG Oral Cap, Take 1,000 mg by mouth one time. , Disp: , Rfl: , 10/20/2021 at 0 symptoms or history of APRIL    Vital signs in last 24 hours:  Patient Vitals for the past 24 hrs:   BP Temp Temp src Pulse Resp SpO2   10/21/21 1240 128/47 98.4 °F (36.9 °C) Oral 79 18 (!) 87 %   10/21/21 0928 110/48 — — — — —   10/21/21 0826 99/47 97.9 °F nontender bilaterally   Skin: No rashes or lesions.    Neurological: Alert, interactive, no focal deficits    Lab Data Review:  Lab Results   Component Value Date    PGLU 145 10/21/2021       Cultures:   Negative PCR    Radiology:  CT CHEST (CPT=71250)    R echo with normal left ventricular function mild pulmonary pressure elevation without fluid history  · Chronic kidney disease followed by Dr. Atif Chavez postop    Plan:  · Repeat blood work check pro calcitonin  · Continue incentive spirometry and begin RECOMMENDATIONS  PT Discharge Recommendations: Sub-acute rehabilitation     PLAN  PT Treatment Plan: Bed mobility; Energy conservation;Patient education; Family education; Neuromuscular re-educate;Strengthening;Transfer training;Balance training  Rehab Potent A Little   -   Climbing 3-5 steps with a railing?: A Little       AM-PAC Score:  Raw Score: 20   Approx Degree of Impairment: 35.83%   Standardized Score (AM-PAC Scale): 47.67   CMS Modifier (G-Code): CJ    FUNCTIONAL ABILITY STATUS  Gait Assessment   Gait Hypoxia post-op. Chest CT 10/21, see below. See below for history.      Surgery 10/20  Pre-Operative Diagnosis: Lumbar radiculopathy [M54.16]  Spinal stenosis of lumbar region without neurogenic claudication [M48.061]  Post-Operative Diagnosis: Lumbar radic deficits, Activity recommendations, LB dressing sequencing   Verbalized understanding. Equipment used: RW  Patient End of Session: Up in chair;Needs met;Call light within reach;RN aware of session/findings; All patient questions and concerns addressed; Al exist for this encounter.      Immunizations     Name Date      [address] 02/22/21     Covid-19 Pfizer 02/01/21       Future Appointments        Provider Kevin Capone    11/4/2021 10:00 AM Yuan Daniel, [address] [place] [address] Vivian

## (undated) NOTE — LETTER
Patient Name: Paul Ramos  : 1935  MRN: [id number]  Patient Address: [address] [place] [id number]      Coronavirus Disease 2019 (COVID-19)     [facility] is committed to the safety and well-being of our patien symptoms carefully. If your symptoms get worse, call your healthcare provider immediately. 3. Get rest and stay hydrated. 4. If you have a medical appointment, call the healthcare provider ahead of time and tell them that you have or may have COVID-19. without the use of fever-reducing medications; and  · Improvement in respiratory symptoms (e.g., cough, shortness of breath); and  · At least 10 days have passed since symptoms first appeared OR if asymptomatic patient or date of symptom onset is unclear t convalescent plasma donors must:    · Have had a confirmed diagnosis of COVID-19  · Be symptom-free for at least 14 days*    *Some people will be required to have a repeat COVID-19 test in order to be eligible to donate.  If you’re instructed by Abdiel Faulkner jackson Post-COVID conditions to be random. Researchers are trying to identify similarities between people with a Post-COVID condition to better understand if there are risk factors. How do I prevent a Post-COVID condition?   The best way to prevent the long-t

## (undated) NOTE — Clinical Note
Please order overnight oximetry for this patient while on her BiPAP and oxygen please DME is ordered